# Patient Record
Sex: FEMALE | Race: BLACK OR AFRICAN AMERICAN | NOT HISPANIC OR LATINO | URBAN - METROPOLITAN AREA
[De-identification: names, ages, dates, MRNs, and addresses within clinical notes are randomized per-mention and may not be internally consistent; named-entity substitution may affect disease eponyms.]

---

## 2017-04-03 ENCOUNTER — EMERGENCY (EMERGENCY)
Facility: HOSPITAL | Age: 77
LOS: 1 days | Discharge: HOME | End: 2017-04-03
Attending: EMERGENCY MEDICINE
Payer: COMMERCIAL

## 2017-04-03 VITALS
RESPIRATION RATE: 16 BRPM | SYSTOLIC BLOOD PRESSURE: 158 MMHG | HEART RATE: 60 BPM | DIASTOLIC BLOOD PRESSURE: 119 MMHG | TEMPERATURE: 98 F | HEIGHT: 67 IN | OXYGEN SATURATION: 100 % | WEIGHT: 199.96 LBS

## 2017-04-03 VITALS
HEART RATE: 99 BPM | RESPIRATION RATE: 16 BRPM | DIASTOLIC BLOOD PRESSURE: 103 MMHG | SYSTOLIC BLOOD PRESSURE: 150 MMHG | OXYGEN SATURATION: 100 % | TEMPERATURE: 98 F

## 2017-04-03 DIAGNOSIS — S09.90XA UNSPECIFIED INJURY OF HEAD, INITIAL ENCOUNTER: ICD-10-CM

## 2017-04-03 DIAGNOSIS — M25.561 PAIN IN RIGHT KNEE: ICD-10-CM
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
## 2017-04-03 DIAGNOSIS — M25.521 PAIN IN RIGHT ELBOW: ICD-10-CM

## 2017-04-03 DIAGNOSIS — I10 ESSENTIAL (PRIMARY) HYPERTENSION: ICD-10-CM

## 2017-04-03 DIAGNOSIS — E11.9 TYPE 2 DIABETES MELLITUS WITHOUT COMPLICATIONS: ICD-10-CM

## 2017-04-03 DIAGNOSIS — M25.511 PAIN IN RIGHT SHOULDER: ICD-10-CM
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
## 2017-04-03 LAB
ALBUMIN SERPL ELPH-MCNC: 4.1 G/DL — SIGNIFICANT CHANGE UP (ref 3.5–5)
ALP SERPL-CCNC: 61 U/L — SIGNIFICANT CHANGE UP (ref 40–120)
ALT FLD-CCNC: 23 U/L DA — SIGNIFICANT CHANGE UP (ref 10–60)
ANION GAP SERPL CALC-SCNC: 10 MMOL/L — SIGNIFICANT CHANGE UP (ref 5–17)
APTT BLD: 32.7 SEC — SIGNIFICANT CHANGE UP (ref 27.5–37.4)
AST SERPL-CCNC: 30 U/L — SIGNIFICANT CHANGE UP (ref 10–40)
BASOPHILS # BLD AUTO: 0.1 K/UL — SIGNIFICANT CHANGE UP (ref 0–0.2)
BASOPHILS NFR BLD AUTO: 1.3 % — SIGNIFICANT CHANGE UP (ref 0–2)
BILIRUB SERPL-MCNC: 0.6 MG/DL — SIGNIFICANT CHANGE UP (ref 0.2–1.2)
BUN SERPL-MCNC: 22 MG/DL — HIGH (ref 7–18)
CALCIUM SERPL-MCNC: 10.7 MG/DL — HIGH (ref 8.4–10.5)
CHLORIDE SERPL-SCNC: 103 MMOL/L — SIGNIFICANT CHANGE UP (ref 96–108)
CO2 SERPL-SCNC: 28 MMOL/L — SIGNIFICANT CHANGE UP (ref 22–31)
CREAT SERPL-MCNC: 1.36 MG/DL — HIGH (ref 0.5–1.3)
DIGOXIN SERPL-MCNC: 0.8 NG/ML — SIGNIFICANT CHANGE UP (ref 0.8–2)
EOSINOPHIL # BLD AUTO: 0.2 K/UL — SIGNIFICANT CHANGE UP (ref 0–0.5)
EOSINOPHIL NFR BLD AUTO: 2.8 % — SIGNIFICANT CHANGE UP (ref 0–6)
GLUCOSE SERPL-MCNC: 111 MG/DL — HIGH (ref 70–99)
HCT VFR BLD CALC: 41.8 % — SIGNIFICANT CHANGE UP (ref 34.5–45)
HGB BLD-MCNC: 12.3 G/DL — SIGNIFICANT CHANGE UP (ref 11.5–15.5)
INR BLD: 1.42 RATIO — HIGH (ref 0.88–1.16)
LYMPHOCYTES # BLD AUTO: 2 K/UL — SIGNIFICANT CHANGE UP (ref 1–3.3)
LYMPHOCYTES # BLD AUTO: 27.6 % — SIGNIFICANT CHANGE UP (ref 13–44)
MCHC RBC-ENTMCNC: 20.7 PG — LOW (ref 27–34)
MCHC RBC-ENTMCNC: 29.4 GM/DL — LOW (ref 32–36)
MCV RBC AUTO: 70.4 FL — LOW (ref 80–100)
MONOCYTES # BLD AUTO: 0.6 K/UL — SIGNIFICANT CHANGE UP (ref 0–0.9)
MONOCYTES NFR BLD AUTO: 8.6 % — SIGNIFICANT CHANGE UP (ref 2–14)
NEUTROPHILS # BLD AUTO: 4.2 K/UL — SIGNIFICANT CHANGE UP (ref 1.8–7.4)
NEUTROPHILS NFR BLD AUTO: 59.7 % — SIGNIFICANT CHANGE UP (ref 43–77)
PLATELET # BLD AUTO: 272 K/UL — SIGNIFICANT CHANGE UP (ref 150–400)
POTASSIUM SERPL-MCNC: 3.3 MMOL/L — LOW (ref 3.5–5.3)
POTASSIUM SERPL-SCNC: 3.3 MMOL/L — LOW (ref 3.5–5.3)
PROT SERPL-MCNC: 8.5 G/DL — HIGH (ref 6–8.3)
PROTHROM AB SERPL-ACNC: 15.6 SEC — HIGH (ref 9.8–12.7)
RBC # BLD: 5.93 M/UL — HIGH (ref 3.8–5.2)
RBC # FLD: 16 % — HIGH (ref 10.3–14.5)
SODIUM SERPL-SCNC: 141 MMOL/L — SIGNIFICANT CHANGE UP (ref 135–145)
WBC # BLD: 7.1 K/UL — SIGNIFICANT CHANGE UP (ref 3.8–10.5)
WBC # FLD AUTO: 7.1 K/UL — SIGNIFICANT CHANGE UP (ref 3.8–10.5)

## 2017-04-03 PROCEDURE — 99284 EMERGENCY DEPT VISIT MOD MDM: CPT

## 2017-04-03 PROCEDURE — 73562 X-RAY EXAM OF KNEE 3: CPT

## 2017-04-03 PROCEDURE — 85027 COMPLETE CBC AUTOMATED: CPT

## 2017-04-03 PROCEDURE — 85730 THROMBOPLASTIN TIME PARTIAL: CPT

## 2017-04-03 PROCEDURE — 73562 X-RAY EXAM OF KNEE 3: CPT | Mod: 26,RT

## 2017-04-03 PROCEDURE — 70450 CT HEAD/BRAIN W/O DYE: CPT

## 2017-04-03 PROCEDURE — 73080 X-RAY EXAM OF ELBOW: CPT | Mod: 26,LT

## 2017-04-03 PROCEDURE — 85610 PROTHROMBIN TIME: CPT

## 2017-04-03 PROCEDURE — 73080 X-RAY EXAM OF ELBOW: CPT

## 2017-04-03 PROCEDURE — 36000 PLACE NEEDLE IN VEIN: CPT

## 2017-04-03 PROCEDURE — 73030 X-RAY EXAM OF SHOULDER: CPT

## 2017-04-03 PROCEDURE — 80053 COMPREHEN METABOLIC PANEL: CPT

## 2017-04-03 PROCEDURE — 73030 X-RAY EXAM OF SHOULDER: CPT | Mod: 26,LT

## 2017-04-03 PROCEDURE — 70450 CT HEAD/BRAIN W/O DYE: CPT | Mod: 26

## 2017-04-03 PROCEDURE — 80162 ASSAY OF DIGOXIN TOTAL: CPT

## 2017-04-03 RX ORDER — ACETAMINOPHEN 500 MG
975 TABLET ORAL ONCE
Qty: 0 | Refills: 0 | Status: COMPLETED | OUTPATIENT
Start: 2017-04-03 | End: 2017-04-03

## 2017-04-03 RX ADMIN — Medication 975 MILLIGRAM(S): at 17:21

## 2017-04-03 NOTE — ED PROVIDER NOTE - PROGRESS NOTE DETAILS
Pain improved as per pt; pt ambulating with steady gait. X-Rays and CT Head are normal. Dig level is normal. INR septic therapeutic instructed to increase; pt instructed for PMD f/u for possible increase of dosage

## 2017-04-03 NOTE — ED PROVIDER NOTE - CRANIAL NERVE AND PUPILLARY EXAM
corneal reflex intact/peripheral vision intact/central vision intact/cranial nerves 2-12 intact/gag reflex intact/cough reflex intact/tongue is midline/central and peripheral vision intact/extra-ocular movements intact

## 2017-04-03 NOTE — ED PROVIDER NOTE - CONSTITUTIONAL, MLM
normal... Well appearing, well nourished, awake, alert, oriented to person, place, time/situation and in no apparent distress. Ambulating with mild limp.

## 2017-04-03 NOTE — ED ADULT NURSE REASSESSMENT NOTE - NS ED NURSE REASSESS COMMENT FT1
Patient remains alert, responsive, resting in stretcher, moving all extremities. MD evaluation in progress. In no acute distress.

## 2017-04-03 NOTE — ED ADULT NURSE NOTE - OBJECTIVE STATEMENT
Patient complains of pain to left arm, right knee, back of head s/p trip and fall at home down about " 8 stairs." No bleeding or gross deformity noted. Denies loss of consciousness, nausea, vomiting at this time. Moving all extremities. Breathing easy and unlabored, speaking in full sentences, no use of accessory muscles. Family at bedside.

## 2017-04-03 NOTE — ED PROVIDER NOTE - MEDICAL DECISION MAKING DETAILS
75 y/o F pt c/o R knee pain, R elbow discomfort, R shoulder, and head trauma s/p mechanical fall down stairs today.  Plan for basic labs, INR check, DIG check, imaging for all affected areas, and reassess. Likely d/c home. 77 y/o F pt c/o R knee pain, R elbow discomfort, R shoulder, and head trauma s/p mechanical fall down stairs today.  Plan for basic labs, INR check, Dig check, imaging for all affected areas, and reassess. Likely d/c home.

## 2017-04-03 NOTE — ED ADULT TRIAGE NOTE - CHIEF COMPLAINT QUOTE
C/o s/p trip and fall going down stairs with pain to right hand, right arm, right shoulder and right knee C/o s/p trip and fall going down stairs with pain to right hand, right arm, right shoulder and right knee. Pt takes warfarin. Reports hitting head during fall. No LOC.

## 2017-04-03 NOTE — ED PROVIDER NOTE - OBJECTIVE STATEMENT
77 y/o F pt with PMHx of DM, Fluid Retention, Irregular Heart Beat (on Coumadin and Digoxin) and HTN and no PSHx presents to ED c/o R knee pain, R elbow discomfort, and R shoulder discomfort s/p mechanical fall today. Pt states she fell backwards down the stairs and progressively slid down the remaining stairs. Pt also reports head trauma during fall. Pt denies LOC, numbness, tingling, weakness, or any other complaints. Allergies: Clavulanate (unknown).

## 2017-04-03 NOTE — ED ADULT NURSE NOTE - CHIEF COMPLAINT QUOTE
C/o s/p trip and fall going down stairs with pain to right hand, right arm, right shoulder and right knee. Pt takes warfarin. Reports hitting head during fall. No LOC.

## 2017-04-03 NOTE — ED PROVIDER NOTE - NS ED MD SCRIBE ATTENDING SCRIBE SECTIONS
VITAL SIGNS( Pullset)/PAST MEDICAL/SURGICAL/SOCIAL HISTORY/REVIEW OF SYSTEMS/HISTORY OF PRESENT ILLNESS/DISPOSITION/PHYSICAL EXAM REVIEW OF SYSTEMS/HISTORY OF PRESENT ILLNESS/PHYSICAL EXAM/PROGRESS NOTE/VITAL SIGNS( Pullset)/PAST MEDICAL/SURGICAL/SOCIAL HISTORY/DISPOSITION

## 2017-06-19 ENCOUNTER — INPATIENT (INPATIENT)
Facility: HOSPITAL | Age: 77
LOS: 4 days | Discharge: HOME HEALTH | DRG: 308 | End: 2017-06-24
Attending: HOSPITALIST | Admitting: HOSPITALIST
Payer: COMMERCIAL

## 2017-06-19 VITALS
HEART RATE: 120 BPM | SYSTOLIC BLOOD PRESSURE: 160 MMHG | OXYGEN SATURATION: 99 % | WEIGHT: 199.96 LBS | TEMPERATURE: 100 F | HEIGHT: 63 IN | DIASTOLIC BLOOD PRESSURE: 126 MMHG | RESPIRATION RATE: 16 BRPM

## 2017-06-19 DIAGNOSIS — J18.9 PNEUMONIA, UNSPECIFIED ORGANISM: ICD-10-CM

## 2017-06-19 DIAGNOSIS — I24.9 ACUTE ISCHEMIC HEART DISEASE, UNSPECIFIED: ICD-10-CM

## 2017-06-19 DIAGNOSIS — N17.9 ACUTE KIDNEY FAILURE, UNSPECIFIED: ICD-10-CM

## 2017-06-19 DIAGNOSIS — I10 ESSENTIAL (PRIMARY) HYPERTENSION: ICD-10-CM

## 2017-06-19 DIAGNOSIS — E11.9 TYPE 2 DIABETES MELLITUS WITHOUT COMPLICATIONS: ICD-10-CM

## 2017-06-19 DIAGNOSIS — Z29.9 ENCOUNTER FOR PROPHYLACTIC MEASURES, UNSPECIFIED: ICD-10-CM

## 2017-06-19 DIAGNOSIS — I48.91 UNSPECIFIED ATRIAL FIBRILLATION: ICD-10-CM

## 2017-06-19 DIAGNOSIS — R79.1 ABNORMAL COAGULATION PROFILE: ICD-10-CM

## 2017-06-19 DIAGNOSIS — I50.9 HEART FAILURE, UNSPECIFIED: ICD-10-CM

## 2017-06-19 LAB
ALBUMIN SERPL ELPH-MCNC: 3.6 G/DL — SIGNIFICANT CHANGE UP (ref 3.5–5)
ALP SERPL-CCNC: 67 U/L — SIGNIFICANT CHANGE UP (ref 40–120)
ALT FLD-CCNC: 16 U/L DA — SIGNIFICANT CHANGE UP (ref 10–60)
ANION GAP SERPL CALC-SCNC: 9 MMOL/L — SIGNIFICANT CHANGE UP (ref 5–17)
ANION GAP SERPL CALC-SCNC: 9 MMOL/L — SIGNIFICANT CHANGE UP (ref 5–17)
APPEARANCE UR: CLEAR — SIGNIFICANT CHANGE UP
APTT BLD: 25.8 SEC — LOW (ref 27.5–37.4)
AST SERPL-CCNC: 17 U/L — SIGNIFICANT CHANGE UP (ref 10–40)
BASOPHILS # BLD AUTO: 0.1 K/UL — SIGNIFICANT CHANGE UP (ref 0–0.2)
BASOPHILS NFR BLD AUTO: 0.7 % — SIGNIFICANT CHANGE UP (ref 0–2)
BILIRUB SERPL-MCNC: 1 MG/DL — SIGNIFICANT CHANGE UP (ref 0.2–1.2)
BILIRUB UR-MCNC: NEGATIVE — SIGNIFICANT CHANGE UP
BUN SERPL-MCNC: 14 MG/DL — SIGNIFICANT CHANGE UP (ref 7–18)
BUN SERPL-MCNC: 15 MG/DL — SIGNIFICANT CHANGE UP (ref 7–18)
CALCIUM SERPL-MCNC: 9.4 MG/DL — SIGNIFICANT CHANGE UP (ref 8.4–10.5)
CALCIUM SERPL-MCNC: 9.6 MG/DL — SIGNIFICANT CHANGE UP (ref 8.4–10.5)
CHLORIDE SERPL-SCNC: 100 MMOL/L — SIGNIFICANT CHANGE UP (ref 96–108)
CHLORIDE SERPL-SCNC: 101 MMOL/L — SIGNIFICANT CHANGE UP (ref 96–108)
CK MB BLD-MCNC: 1.4 % — SIGNIFICANT CHANGE UP (ref 0–3.5)
CK MB BLD-MCNC: 1.5 % — SIGNIFICANT CHANGE UP (ref 0–3.5)
CK MB CFR SERPL CALC: 1.2 NG/ML — SIGNIFICANT CHANGE UP (ref 0–3.6)
CK MB CFR SERPL CALC: 1.2 NG/ML — SIGNIFICANT CHANGE UP (ref 0–3.6)
CK SERPL-CCNC: 81 U/L — SIGNIFICANT CHANGE UP (ref 21–215)
CK SERPL-CCNC: 88 U/L — SIGNIFICANT CHANGE UP (ref 21–215)
CO2 SERPL-SCNC: 29 MMOL/L — SIGNIFICANT CHANGE UP (ref 22–31)
CO2 SERPL-SCNC: 30 MMOL/L — SIGNIFICANT CHANGE UP (ref 22–31)
COLOR SPEC: YELLOW — SIGNIFICANT CHANGE UP
CREAT SERPL-MCNC: 1.29 MG/DL — SIGNIFICANT CHANGE UP (ref 0.5–1.3)
CREAT SERPL-MCNC: 1.4 MG/DL — HIGH (ref 0.5–1.3)
DIFF PNL FLD: NEGATIVE — SIGNIFICANT CHANGE UP
DIGOXIN SERPL-MCNC: 0.3 NG/ML — LOW (ref 0.8–2)
EOSINOPHIL # BLD AUTO: 0 K/UL — SIGNIFICANT CHANGE UP (ref 0–0.5)
EOSINOPHIL NFR BLD AUTO: 0.4 % — SIGNIFICANT CHANGE UP (ref 0–6)
GLUCOSE SERPL-MCNC: 147 MG/DL — HIGH (ref 70–99)
GLUCOSE SERPL-MCNC: 178 MG/DL — HIGH (ref 70–99)
GLUCOSE UR QL: NEGATIVE — SIGNIFICANT CHANGE UP
HBA1C BLD-MCNC: 6.6 % — HIGH (ref 4–5.6)
HCT VFR BLD CALC: 36.5 % — SIGNIFICANT CHANGE UP (ref 34.5–45)
HGB BLD-MCNC: 10.9 G/DL — LOW (ref 11.5–15.5)
INR BLD: 1.23 RATIO — HIGH (ref 0.88–1.16)
INR BLD: 1.25 RATIO — HIGH (ref 0.88–1.16)
KETONES UR-MCNC: NEGATIVE — SIGNIFICANT CHANGE UP
LACTATE SERPL-SCNC: 1.7 MMOL/L — SIGNIFICANT CHANGE UP (ref 0.7–2)
LEUKOCYTE ESTERASE UR-ACNC: NEGATIVE — SIGNIFICANT CHANGE UP
LYMPHOCYTES # BLD AUTO: 1.6 K/UL — SIGNIFICANT CHANGE UP (ref 1–3.3)
LYMPHOCYTES # BLD AUTO: 16.2 % — SIGNIFICANT CHANGE UP (ref 13–44)
MCHC RBC-ENTMCNC: 21.5 PG — LOW (ref 27–34)
MCHC RBC-ENTMCNC: 29.9 GM/DL — LOW (ref 32–36)
MCV RBC AUTO: 72 FL — LOW (ref 80–100)
MONOCYTES # BLD AUTO: 0.9 K/UL — SIGNIFICANT CHANGE UP (ref 0–0.9)
MONOCYTES NFR BLD AUTO: 9.3 % — SIGNIFICANT CHANGE UP (ref 2–14)
NEUTROPHILS # BLD AUTO: 7.1 K/UL — SIGNIFICANT CHANGE UP (ref 1.8–7.4)
NEUTROPHILS NFR BLD AUTO: 73.4 % — SIGNIFICANT CHANGE UP (ref 43–77)
NITRITE UR-MCNC: NEGATIVE — SIGNIFICANT CHANGE UP
NT-PROBNP SERPL-SCNC: 2735 PG/ML — HIGH (ref 0–450)
PH UR: 8 — SIGNIFICANT CHANGE UP (ref 5–8)
PLATELET # BLD AUTO: 325 K/UL — SIGNIFICANT CHANGE UP (ref 150–400)
POTASSIUM SERPL-MCNC: 3.2 MMOL/L — LOW (ref 3.5–5.3)
POTASSIUM SERPL-MCNC: 3.3 MMOL/L — LOW (ref 3.5–5.3)
POTASSIUM SERPL-SCNC: 3.2 MMOL/L — LOW (ref 3.5–5.3)
POTASSIUM SERPL-SCNC: 3.3 MMOL/L — LOW (ref 3.5–5.3)
PROT SERPL-MCNC: 8 G/DL — SIGNIFICANT CHANGE UP (ref 6–8.3)
PROT UR-MCNC: 100
PROTHROM AB SERPL-ACNC: 13.5 SEC — HIGH (ref 9.8–12.7)
PROTHROM AB SERPL-ACNC: 13.7 SEC — HIGH (ref 9.8–12.7)
RAPID RVP RESULT: DETECTED
RBC # BLD: 5.06 M/UL — SIGNIFICANT CHANGE UP (ref 3.8–5.2)
RBC # FLD: 15.7 % — HIGH (ref 10.3–14.5)
RV+EV RNA SPEC QL NAA+PROBE: DETECTED
SODIUM SERPL-SCNC: 138 MMOL/L — SIGNIFICANT CHANGE UP (ref 135–145)
SODIUM SERPL-SCNC: 140 MMOL/L — SIGNIFICANT CHANGE UP (ref 135–145)
SP GR SPEC: 1.01 — SIGNIFICANT CHANGE UP (ref 1.01–1.02)
TROPONIN I SERPL-MCNC: 0.05 NG/ML — HIGH (ref 0–0.04)
TROPONIN I SERPL-MCNC: 0.06 NG/ML — HIGH (ref 0–0.04)
TROPONIN I SERPL-MCNC: 0.06 NG/ML — HIGH (ref 0–0.04)
UROBILINOGEN FLD QL: NEGATIVE — SIGNIFICANT CHANGE UP
WBC # BLD: 9.7 K/UL — SIGNIFICANT CHANGE UP (ref 3.8–10.5)
WBC # FLD AUTO: 9.7 K/UL — SIGNIFICANT CHANGE UP (ref 3.8–10.5)

## 2017-06-19 PROCEDURE — 99223 1ST HOSP IP/OBS HIGH 75: CPT | Mod: GC

## 2017-06-19 PROCEDURE — 71010: CPT | Mod: 26

## 2017-06-19 PROCEDURE — 99285 EMERGENCY DEPT VISIT HI MDM: CPT

## 2017-06-19 RX ORDER — CEFTRIAXONE 500 MG/1
1 INJECTION, POWDER, FOR SOLUTION INTRAMUSCULAR; INTRAVENOUS EVERY 24 HOURS
Qty: 0 | Refills: 0 | Status: DISCONTINUED | OUTPATIENT
Start: 2017-06-19 | End: 2017-06-19

## 2017-06-19 RX ORDER — POTASSIUM CHLORIDE 20 MEQ
40 PACKET (EA) ORAL ONCE
Qty: 0 | Refills: 0 | Status: COMPLETED | OUTPATIENT
Start: 2017-06-19 | End: 2017-06-19

## 2017-06-19 RX ORDER — CEFTRIAXONE 500 MG/1
1 INJECTION, POWDER, FOR SOLUTION INTRAMUSCULAR; INTRAVENOUS ONCE
Qty: 0 | Refills: 0 | Status: COMPLETED | OUTPATIENT
Start: 2017-06-19 | End: 2017-06-19

## 2017-06-19 RX ORDER — LISINOPRIL 2.5 MG/1
5 TABLET ORAL DAILY
Qty: 0 | Refills: 0 | Status: DISCONTINUED | OUTPATIENT
Start: 2017-06-19 | End: 2017-06-24

## 2017-06-19 RX ORDER — ACETAMINOPHEN 500 MG
975 TABLET ORAL ONCE
Qty: 0 | Refills: 0 | Status: COMPLETED | OUTPATIENT
Start: 2017-06-19 | End: 2017-06-19

## 2017-06-19 RX ORDER — AZITHROMYCIN 500 MG/1
500 TABLET, FILM COATED ORAL ONCE
Qty: 0 | Refills: 0 | Status: COMPLETED | OUTPATIENT
Start: 2017-06-19 | End: 2017-06-19

## 2017-06-19 RX ORDER — ASPIRIN/CALCIUM CARB/MAGNESIUM 324 MG
81 TABLET ORAL DAILY
Qty: 0 | Refills: 0 | Status: DISCONTINUED | OUTPATIENT
Start: 2017-06-19 | End: 2017-06-24

## 2017-06-19 RX ORDER — ATENOLOL 25 MG/1
100 TABLET ORAL DAILY
Qty: 0 | Refills: 0 | Status: DISCONTINUED | OUTPATIENT
Start: 2017-06-19 | End: 2017-06-19

## 2017-06-19 RX ORDER — DIGOXIN 250 MCG
0.12 TABLET ORAL DAILY
Qty: 0 | Refills: 0 | Status: DISCONTINUED | OUTPATIENT
Start: 2017-06-19 | End: 2017-06-24

## 2017-06-19 RX ORDER — POTASSIUM CHLORIDE 20 MEQ
40 PACKET (EA) ORAL ONCE
Qty: 0 | Refills: 0 | Status: DISCONTINUED | OUTPATIENT
Start: 2017-06-19 | End: 2017-06-19

## 2017-06-19 RX ORDER — INSULIN LISPRO 100/ML
VIAL (ML) SUBCUTANEOUS
Qty: 0 | Refills: 0 | Status: DISCONTINUED | OUTPATIENT
Start: 2017-06-19 | End: 2017-06-24

## 2017-06-19 RX ORDER — METOPROLOL TARTRATE 50 MG
25 TABLET ORAL
Qty: 0 | Refills: 0 | Status: DISCONTINUED | OUTPATIENT
Start: 2017-06-19 | End: 2017-06-24

## 2017-06-19 RX ORDER — WARFARIN SODIUM 2.5 MG/1
5 TABLET ORAL ONCE
Qty: 0 | Refills: 0 | Status: DISCONTINUED | OUTPATIENT
Start: 2017-06-19 | End: 2017-06-19

## 2017-06-19 RX ORDER — FUROSEMIDE 40 MG
40 TABLET ORAL DAILY
Qty: 0 | Refills: 0 | Status: DISCONTINUED | OUTPATIENT
Start: 2017-06-19 | End: 2017-06-21

## 2017-06-19 RX ORDER — ATORVASTATIN CALCIUM 80 MG/1
40 TABLET, FILM COATED ORAL AT BEDTIME
Qty: 0 | Refills: 0 | Status: DISCONTINUED | OUTPATIENT
Start: 2017-06-19 | End: 2017-06-24

## 2017-06-19 RX ORDER — SODIUM CHLORIDE 9 MG/ML
3 INJECTION INTRAMUSCULAR; INTRAVENOUS; SUBCUTANEOUS ONCE
Qty: 0 | Refills: 0 | Status: COMPLETED | OUTPATIENT
Start: 2017-06-19 | End: 2017-06-19

## 2017-06-19 RX ORDER — FUROSEMIDE 40 MG
40 TABLET ORAL DAILY
Qty: 0 | Refills: 0 | Status: DISCONTINUED | OUTPATIENT
Start: 2017-06-19 | End: 2017-06-19

## 2017-06-19 RX ORDER — MECLIZINE HCL 12.5 MG
25 TABLET ORAL THREE TIMES A DAY
Qty: 0 | Refills: 0 | Status: DISCONTINUED | OUTPATIENT
Start: 2017-06-19 | End: 2017-06-24

## 2017-06-19 RX ORDER — HEPARIN SODIUM 5000 [USP'U]/ML
5000 INJECTION INTRAVENOUS; SUBCUTANEOUS EVERY 12 HOURS
Qty: 0 | Refills: 0 | Status: DISCONTINUED | OUTPATIENT
Start: 2017-06-19 | End: 2017-06-23

## 2017-06-19 RX ORDER — AZITHROMYCIN 500 MG/1
500 TABLET, FILM COATED ORAL EVERY 24 HOURS
Qty: 0 | Refills: 0 | Status: DISCONTINUED | OUTPATIENT
Start: 2017-06-19 | End: 2017-06-21

## 2017-06-19 RX ORDER — WARFARIN SODIUM 2.5 MG/1
7.5 TABLET ORAL ONCE
Qty: 0 | Refills: 0 | Status: COMPLETED | OUTPATIENT
Start: 2017-06-19 | End: 2017-06-19

## 2017-06-19 RX ADMIN — ATORVASTATIN CALCIUM 40 MILLIGRAM(S): 80 TABLET, FILM COATED ORAL at 22:06

## 2017-06-19 RX ADMIN — Medication 81 MILLIGRAM(S): at 15:36

## 2017-06-19 RX ADMIN — SODIUM CHLORIDE 3 MILLILITER(S): 9 INJECTION INTRAMUSCULAR; INTRAVENOUS; SUBCUTANEOUS at 10:52

## 2017-06-19 RX ADMIN — Medication 25 MILLIGRAM(S): at 18:24

## 2017-06-19 RX ADMIN — Medication 25 MILLIGRAM(S): at 14:00

## 2017-06-19 RX ADMIN — WARFARIN SODIUM 7.5 MILLIGRAM(S): 2.5 TABLET ORAL at 22:06

## 2017-06-19 RX ADMIN — AZITHROMYCIN 250 MILLIGRAM(S): 500 TABLET, FILM COATED ORAL at 23:53

## 2017-06-19 RX ADMIN — Medication 40 MILLIEQUIVALENT(S): at 22:06

## 2017-06-19 RX ADMIN — LISINOPRIL 5 MILLIGRAM(S): 2.5 TABLET ORAL at 15:36

## 2017-06-19 RX ADMIN — Medication 30 MILLILITER(S): at 22:16

## 2017-06-19 RX ADMIN — CEFTRIAXONE 100 GRAM(S): 500 INJECTION, POWDER, FOR SOLUTION INTRAMUSCULAR; INTRAVENOUS at 13:20

## 2017-06-19 RX ADMIN — AZITHROMYCIN 250 MILLIGRAM(S): 500 TABLET, FILM COATED ORAL at 14:00

## 2017-06-19 RX ADMIN — Medication 40 MILLIEQUIVALENT(S): at 15:36

## 2017-06-19 RX ADMIN — Medication 0.12 MILLIGRAM(S): at 15:36

## 2017-06-19 RX ADMIN — Medication 40 MILLIGRAM(S): at 15:36

## 2017-06-19 RX ADMIN — HEPARIN SODIUM 5000 UNIT(S): 5000 INJECTION INTRAVENOUS; SUBCUTANEOUS at 18:24

## 2017-06-19 RX ADMIN — Medication 975 MILLIGRAM(S): at 13:19

## 2017-06-19 NOTE — ED PROVIDER NOTE - CARE PLAN
Principal Discharge DX:	Atrial fibrillation  Secondary Diagnosis:	Pneumonia Principal Discharge DX:	Atrial fibrillation  Secondary Diagnosis:	Pneumonia  Secondary Diagnosis:	ACS (acute coronary syndrome)

## 2017-06-19 NOTE — H&P ADULT - ASSESSMENT
75 y/o female from home, walks with a cane at baseline, with PMHx of DM, HTN, (A Fib on coumadin) CVA (chronic occipital infarct) vertigo and ?CHF(on lasix and lisinopril at home) and PSx of removal of a cyst in the back 6 years ago presented with generalized body aches and fever since 3 days. 75 y/o female from home, walks with a cane at baseline, with PMHx of DM, HTN, (A Fib on coumadin) CVA (chronic occipital infarct) vertigo and ?CHF(on lasix and lisinopril at home) and PSx of removal of a cyst in the back 6 years ago presented with generalized body aches and fever since 3 days.   Patient states having intermittent cough since last month. She had cough and a mechanical fall 1 month ago for which she was evaluated in the ED and discharged after negative imaging and normal labs([place],2017). Also she ran out of her atenolol and digoxin since last month . Since last 3 days she has had 'all body aches and fever with chills. Cough is dry and associated with fever, chills, intermittent dyspnea, nasal congestion, diaphoresis but no chest pain or palpitations. Denies recent sick contacts, recent travel, long periods of immobilization. Pt did not take any medication for symptoms. Denies pedal edema, calf pain, vomiting, chills, diarrhea, constipation or abdominal pain. Has increased frequency of urine due to lasix but no dysuria. No flu shot. She had a normal stress test and ECHO about 6 months ago at out patient. She did not take any of the medication yesterday.

## 2017-06-19 NOTE — H&P ADULT - PROBLEM SELECTOR PLAN 4
- continue lisinopril and atenolol with parameters - BUN/C ratio<20   - likely sec to diuretics (contraction alkalosis and hypokalemia)  - hold nephrotoxic drugs   - BUN/Cr in am   - f/u UA and urine lytes '  - renal US if persists   - replace and monitor electrolyses

## 2017-06-19 NOTE — H&P ADULT - PROBLEM SELECTOR PLAN 3
- troponin elevated to 0.059 and pro-BNP 2735   - will monitor in telemetry   - trend cardiac enzymes   - resume Lasix once patient is stable (Afib controlled and afebrile)    - continue aspirin, statin and beta-blocker(atenolol) as per ACS protocol. - troponin elevated to 0.059   - will monitor in telemetry   - trend cardiac enzymes   - resume Lasix once patient is stable (Afib controlled and afebrile)    - continue aspirin, statin and beta-blocker(atenolol) as per ACS protocol.

## 2017-06-19 NOTE — H&P ADULT - NSHPLABSRESULTS_GEN_ALL_CORE
Vital Signs Last 24 Hrs  T(C): 37.9, Max: 37.9 (06-19 @ 10:03)  T(F): 100.2, Max: 100.2 (06-19 @ 10:03)  HR: 120 (120 - 120)  BP: 160/126 (160/126 - 160/126)  BP(mean): --  RR: 16 (16 - 16)  SpO2: 99% (99% - 99%)    INTERPRETATION:  Portable chest x-ray    Indication: Chest pain.    Portable chest x-ray is compared to a previous examination dated 2/6/2013.    Impression: No evidence for pulmonary consolidation, pleural effusion or   pneumothorax.    Skinfolds bilaterally.    New small left basilar atelectasis.    Possible mild right peritracheal density with mild left tracheal   deviation. 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 If clinically   indicated, chest CT without IV contrast may be pursued for further   evaluation on a nonemergent outpatient basis.

## 2017-06-19 NOTE — H&P ADULT - NEGATIVE GASTROINTESTINAL SYMPTOMS
no change in bowel habits/no nausea/no vomiting/no diarrhea/no flatulence/no constipation/no abdominal pain

## 2017-06-19 NOTE — H&P ADULT - HISTORY OF PRESENT ILLNESS
77 y/o female from home, walks with a cane at baseline, with PMHx of DM, HTN, (A Fib on coumadin) CVA (chronic occipital infarct) vertigo and ?CHF(on lasix and lisinopril at home) and PSx of removal of a cyst in the back 6 years ago presented with generalized body aches and fever since 3 days.   Patient states having intermittent cough since last month. She had cough and a mechanical fall 1 month ago for which she was evaluated in the ED and discharged after negative imaging and normal labs([place],2017). Since last 3 days she has had 'all body aches and fever with chills. Cough is dry and associated with fever, chills, intermittent dyspnea, nasal congestion, diaphoresis but no chest pain or palpitations. Denies recent sick contacts, recent travel, long periods of immobilization. Pt did not take any medication for symptoms. Denies pedal edema, calf pain, vomiting, chills, diarrhea, constipation or abdominal pain. Has increased frequency of urine but no dysuria. No flu shot. She had a normal stress test and ECHO about 6 months ago at out patient.   Non smoker. 75 y/o female from home, walks with a cane at baseline, with PMH of DM, HTN, (A Fib on coumadin) CVA (chronic occipital infarct), vertigo and ?CHF(on lasix and lisinopril at home) and PSH of removal of a cyst in the back 6 years ago presented with generalized body aches and fever since 3 days.   Patient states having intermittent cough since last month. She had cough and a mechanical fall 1 month ago for which she was evaluated in the ED and discharged after negative imaging and normal labs([place],2017). Also she ran out of her atenolol and digoxin since last month . Since last 3 days she has had 'all body aches and fever with chills. Cough is dry and associated with fever, chills, intermittent dyspnea, nasal congestion, diaphoresis but no chest pain or palpitations. Denies recent sick contacts, recent travel, long periods of immobilization. Pt did not take any medication for symptoms. Denies pedal edema, calf pain, vomiting, chills, diarrhea, constipation or abdominal pain. Has increased frequency of urine due to lasix but no dysuria. No flu shot. She had a normal stress test and ECHO about 6 months ago at out patient. She did not take any of the medication yesterday.   Non smoker. 77 y/o female from home, walks with a cane at baseline, with PMH of DM, HTN, (A Fib on coumadin) CVA (chronic occipital infarct), arthritis (?gout of the knees on colchicine and probenecid)  vertigo and ?CHF(on lasix and lisinopril at home) and PSH of removal of a cyst in the back 6 years ago presented with generalized body aches and fever since 3 days.   Patient states having intermittent cough since last month. She had cough and a mechanical fall 1 month ago for which she was evaluated in the ED and discharged after negative imaging and normal labs([place],2017). Also she ran out of her atenolol and digoxin since last month . Since last 3 days she has had 'all body aches and fever with chills. Cough is dry and associated with fever, chills, intermittent dyspnea, nasal congestion, diaphoresis but no chest pain or palpitations. Denies recent sick contacts, recent travel, long periods of immobilization. Pt did not take any medication for symptoms. Denies pedal edema, calf pain, vomiting, chills, diarrhea, constipation or abdominal pain. Has increased frequency of urine due to lasix but no dysuria. No flu shot. She had a normal stress test and ECHO about 6 months ago at out patient. She did not take any of the medication yesterday.   Non smoker.

## 2017-06-19 NOTE — H&P ADULT - PROBLEM SELECTOR PLAN 2
- most likely bacterial on viral pneumonia   - rvp positive for enterovirus and CXR shows a new LL atelectasis   - will continue Rocephin and Zithromax for CAP (patient was in ED briefly for a day within 90 days but doesn't have leucocytosis hence will treat as CAP)   - Tylenol as needed for fever   - will hold lasix for flu - most likely developing bacterial pneumonia (left sided atelectasis on CXR) on viral bronchitis   - rvp positive for enterovirus and CXR shows a new LL atelectasis   - will continue Rocephin and Zithromax for CAP (patient was in ED briefly for a day within 90 days but doesn't have leucocytosis hence will treat as CAP)   - Tylenol as needed for fever   - will hold lasix for flu - most likely developing bacterial pneumonia (left sided atelectasis on CXR) on viral bronchitis   - rvp positive for enterovirus and CXR shows a new LL atelectasis   - will continue Rocephin and Zithromax for CAP (patient was in ED briefly for a day within 90 days but doesn't have leucocytosis hence will treat as CAP)   - Tylenol as needed for fever

## 2017-06-19 NOTE — H&P ADULT - ATTENDING COMMENTS
Patient was seen and examined by myself with team. Case was discussed with house staff in details.  Plan discussed with patient and family in details at bedside and all their questions / concerns were addressed  75 y/o f admitted for Rapid AFIB due to medication non compliance; also with acute bronchitis  - admit to telemetry  - Rate control;   serial cardiac enzymes.  Mildly elevated troponin likely du to demand in the setting of rapid AFIB.- trend enzymes and monitor.  Sub therapeutic INR- dose coumadin 7.5 tonight; Monitor INR daily.  Other plan as outlined above.

## 2017-06-19 NOTE — H&P ADULT - PROBLEM SELECTOR PLAN 1
- BRAYAN VASC of 4 (5 if CHF+)  - Afiv with rvr secondary to non compliance with medications and viral pneumonia   - rate controlled significantly by fluids only (120s to 90s).   - cardizem 10 once given in ED   - will resume patient's atenolol and digoxin for rate control (dig level only 0.5 confirms non compliance). f/u repeat level in am   - will continue warfarin at home dose; f/u Pt INR for dose titration   - CT head 4/2017 shows chronic occipital infarct; will continue aspirin and lipitor - BRAYAN VASC of 4 (5 if CHF+)  - Afiv with rvr secondary to non compliance with medications and viral pneumonia   - rate controlled significantly by fluids only (120s to 90s).   - iv cardizem 10 once given in ED   - will resume digoxin for rate control (dig level only 0.5 confirms non compliance). f/u repeat level in am   - will give lopressor 25 bid in place of atenolol 100mg daily   - will continue warfarin at home dose; f/u Pt INR for dose titration   - CT head 4/2017 shows chronic occipital infarct; will continue aspirin and lipitor - BRAYAN VASC of 4 (5 if CHF+)  - Afib with rvr secondary to non compliance with medications and viral pneumonia   - rate controlled significantly by fluids only (120s to 90s).   - iv cardizem 10 once given in ED   - will resume digoxin for rate control (dig level only 0.5 confirms non compliance). f/u repeat level in am   - will give lopressor 25 bid in place of atenolol 100mg daily   - CT head 4/2017 shows chronic occipital infarct; will continue aspirin and lipitor - BRAYAN VASC of 4 (5 if CHF+)  - Afib with rvr secondary to non compliance with medications and viral pneumonia   - rate controlled significantly by fluids only (120s to 90s).   - iv cardizem 10 once given in ED   - will resume digoxin for rate control (dig level only 0.5 confirms non compliance). f/u repeat level in am   - will give lopressor 25 bid in place of atenolol 100mg daily   - CT head 4/2017 shows chronic occipital infarct; will continue aspirin and lipitor  - HTN urgency on presentation was also due to non complaince and resolved on resuming home meds

## 2017-06-19 NOTE — ED PROVIDER NOTE - OBJECTIVE STATEMENT
77 y/o female with significant PMHx of DM, HTN, vertigo, presents to the ED c/o generalized body aches and fever x today. Pt also reports cough and intermittent HULL. Pt denies recent sick contacts, recent travel, long periods of immobilization or Hx of DVT/PE/CHF. Pt did not take any medication for Sx. Pt's last visit in ED was 2 months ago. Pt denies CP, pedal edema, diaphoresis, palpitations, calf pain, LOC, vomiting, chills, or any other complaints. NKDA. Currently taking: Digoxin, coumadin, Baby ASA. Non-smoker.

## 2017-06-19 NOTE — H&P ADULT - PROBLEM SELECTOR PLAN 7
- BUN/C ratio<20   - likely sec to diuretics (contraction alkalosis and hypokalemia)  - hold lasix and nephrotoxic drugs   - BUN/Cr in am   - f/u UA and urine lytes '  - renal US if persists   - replace and monitor electrolyses - hold metformin and glipizide  - continue HSS - continue lisinopril and lopressor with parameters

## 2017-06-19 NOTE — H&P ADULT - PROBLEM SELECTOR PLAN 5
- hold metformin and glipizide  - continue HSS - patient has 2-3+ pedal edema and pro-BNP 2735   - will give lasix 45 iv daily

## 2017-06-19 NOTE — H&P ADULT - PROBLEM SELECTOR PLAN 6
- IMPROVE VTE is 2  - sc heparin for DVT ppx - continue lisinopril and lopressor with parameters - patient has supra therapeutic INR sec to non compliance. '  - will give dose of coumadin 7.5 tonight   - resume home dose of warfarin once INR therapeutic   - f/u Pt INR in am for dose titration

## 2017-06-20 DIAGNOSIS — I50.41 ACUTE COMBINED SYSTOLIC (CONGESTIVE) AND DIASTOLIC (CONGESTIVE) HEART FAILURE: ICD-10-CM

## 2017-06-20 DIAGNOSIS — D64.9 ANEMIA, UNSPECIFIED: ICD-10-CM

## 2017-06-20 LAB
ALBUMIN SERPL ELPH-MCNC: 3.3 G/DL — LOW (ref 3.5–5)
ALP SERPL-CCNC: 59 U/L — SIGNIFICANT CHANGE UP (ref 40–120)
ALT FLD-CCNC: 15 U/L DA — SIGNIFICANT CHANGE UP (ref 10–60)
ANION GAP SERPL CALC-SCNC: 8 MMOL/L — SIGNIFICANT CHANGE UP (ref 5–17)
AST SERPL-CCNC: 15 U/L — SIGNIFICANT CHANGE UP (ref 10–40)
BASOPHILS # BLD AUTO: 0.1 K/UL — SIGNIFICANT CHANGE UP (ref 0–0.2)
BASOPHILS NFR BLD AUTO: 0.7 % — SIGNIFICANT CHANGE UP (ref 0–2)
BILIRUB SERPL-MCNC: 0.8 MG/DL — SIGNIFICANT CHANGE UP (ref 0.2–1.2)
BUN SERPL-MCNC: 16 MG/DL — SIGNIFICANT CHANGE UP (ref 7–18)
CALCIUM SERPL-MCNC: 9.1 MG/DL — SIGNIFICANT CHANGE UP (ref 8.4–10.5)
CHLORIDE SERPL-SCNC: 103 MMOL/L — SIGNIFICANT CHANGE UP (ref 96–108)
CO2 SERPL-SCNC: 31 MMOL/L — SIGNIFICANT CHANGE UP (ref 22–31)
CREAT SERPL-MCNC: 1.44 MG/DL — HIGH (ref 0.5–1.3)
CULTURE RESULTS: SIGNIFICANT CHANGE UP
DIGOXIN SERPL-MCNC: 0.6 NG/ML — LOW (ref 0.8–2)
EOSINOPHIL # BLD AUTO: 0.1 K/UL — SIGNIFICANT CHANGE UP (ref 0–0.5)
EOSINOPHIL NFR BLD AUTO: 0.7 % — SIGNIFICANT CHANGE UP (ref 0–6)
FERRITIN SERPL-MCNC: 34 NG/ML — SIGNIFICANT CHANGE UP (ref 15–150)
GLUCOSE SERPL-MCNC: 114 MG/DL — HIGH (ref 70–99)
HCT VFR BLD CALC: 33 % — LOW (ref 34.5–45)
HCT VFR BLD CALC: 33.6 % — LOW (ref 34.5–45)
HGB BLD-MCNC: 10 G/DL — LOW (ref 11.5–15.5)
HGB BLD-MCNC: 9.9 G/DL — LOW (ref 11.5–15.5)
INR BLD: 1.29 RATIO — HIGH (ref 0.88–1.16)
IRON SATN MFR SERPL: 35 UG/DL — LOW (ref 40–150)
IRON SATN MFR SERPL: 9 % — LOW (ref 15–50)
LYMPHOCYTES # BLD AUTO: 1.6 K/UL — SIGNIFICANT CHANGE UP (ref 1–3.3)
LYMPHOCYTES # BLD AUTO: 18.6 % — SIGNIFICANT CHANGE UP (ref 13–44)
MAGNESIUM SERPL-MCNC: 2.1 MG/DL — SIGNIFICANT CHANGE UP (ref 1.6–2.6)
MCHC RBC-ENTMCNC: 21.8 PG — LOW (ref 27–34)
MCHC RBC-ENTMCNC: 21.9 PG — LOW (ref 27–34)
MCHC RBC-ENTMCNC: 29.9 GM/DL — LOW (ref 32–36)
MCHC RBC-ENTMCNC: 30.1 GM/DL — LOW (ref 32–36)
MCV RBC AUTO: 72.8 FL — LOW (ref 80–100)
MCV RBC AUTO: 72.9 FL — LOW (ref 80–100)
MONOCYTES # BLD AUTO: 0.7 K/UL — SIGNIFICANT CHANGE UP (ref 0–0.9)
MONOCYTES NFR BLD AUTO: 8.7 % — SIGNIFICANT CHANGE UP (ref 2–14)
NEUTROPHILS # BLD AUTO: 6 K/UL — SIGNIFICANT CHANGE UP (ref 1.8–7.4)
NEUTROPHILS NFR BLD AUTO: 71.2 % — SIGNIFICANT CHANGE UP (ref 43–77)
OB PNL STL: NEGATIVE — SIGNIFICANT CHANGE UP
PHOSPHATE SERPL-MCNC: 3.5 MG/DL — SIGNIFICANT CHANGE UP (ref 2.5–4.5)
PLATELET # BLD AUTO: 225 K/UL — SIGNIFICANT CHANGE UP (ref 150–400)
PLATELET # BLD AUTO: 234 K/UL — SIGNIFICANT CHANGE UP (ref 150–400)
POTASSIUM SERPL-MCNC: 3.9 MMOL/L — SIGNIFICANT CHANGE UP (ref 3.5–5.3)
POTASSIUM SERPL-SCNC: 3.9 MMOL/L — SIGNIFICANT CHANGE UP (ref 3.5–5.3)
PROT SERPL-MCNC: 7.1 G/DL — SIGNIFICANT CHANGE UP (ref 6–8.3)
PROTHROM AB SERPL-ACNC: 14.1 SEC — HIGH (ref 9.8–12.7)
RBC # BLD: 4.54 M/UL — SIGNIFICANT CHANGE UP (ref 3.8–5.2)
RBC # BLD: 4.61 M/UL — SIGNIFICANT CHANGE UP (ref 3.8–5.2)
RBC # FLD: 15.5 % — HIGH (ref 10.3–14.5)
RBC # FLD: 15.7 % — HIGH (ref 10.3–14.5)
SODIUM SERPL-SCNC: 142 MMOL/L — SIGNIFICANT CHANGE UP (ref 135–145)
SPECIMEN SOURCE: SIGNIFICANT CHANGE UP
TIBC SERPL-MCNC: 381 UG/DL — SIGNIFICANT CHANGE UP (ref 250–450)
TRANSFERRIN SERPL-MCNC: 327 MG/DL — SIGNIFICANT CHANGE UP (ref 200–360)
UIBC SERPL-MCNC: 346 UG/DL — SIGNIFICANT CHANGE UP (ref 110–370)
WBC # BLD: 7.8 K/UL — SIGNIFICANT CHANGE UP (ref 3.8–10.5)
WBC # BLD: 8.4 K/UL — SIGNIFICANT CHANGE UP (ref 3.8–10.5)
WBC # FLD AUTO: 7.8 K/UL — SIGNIFICANT CHANGE UP (ref 3.8–10.5)
WBC # FLD AUTO: 8.4 K/UL — SIGNIFICANT CHANGE UP (ref 3.8–10.5)

## 2017-06-20 PROCEDURE — 99233 SBSQ HOSP IP/OBS HIGH 50: CPT | Mod: GC

## 2017-06-20 RX ORDER — METOPROLOL TARTRATE 50 MG
1 TABLET ORAL
Qty: 60 | Refills: 0 | OUTPATIENT
Start: 2017-06-20 | End: 2017-07-20

## 2017-06-20 RX ORDER — FERROUS SULFATE 325(65) MG
325 TABLET ORAL DAILY
Qty: 0 | Refills: 0 | Status: DISCONTINUED | OUTPATIENT
Start: 2017-06-20 | End: 2017-06-24

## 2017-06-20 RX ORDER — IRON SUCROSE 20 MG/ML
100 INJECTION, SOLUTION INTRAVENOUS DAILY
Qty: 0 | Refills: 0 | Status: DISCONTINUED | OUTPATIENT
Start: 2017-06-20 | End: 2017-06-20

## 2017-06-20 RX ORDER — FERROUS SULFATE 325(65) MG
1 TABLET ORAL
Qty: 30 | Refills: 0 | OUTPATIENT
Start: 2017-06-20 | End: 2017-07-20

## 2017-06-20 RX ORDER — WARFARIN SODIUM 2.5 MG/1
7.5 TABLET ORAL ONCE
Qty: 0 | Refills: 0 | Status: COMPLETED | OUTPATIENT
Start: 2017-06-20 | End: 2017-06-20

## 2017-06-20 RX ORDER — ATENOLOL 25 MG/1
1 TABLET ORAL
Qty: 0 | Refills: 0 | COMMUNITY

## 2017-06-20 RX ORDER — ATORVASTATIN CALCIUM 80 MG/1
1 TABLET, FILM COATED ORAL
Qty: 30 | Refills: 0 | OUTPATIENT
Start: 2017-06-20 | End: 2017-07-20

## 2017-06-20 RX ADMIN — Medication 25 MILLIGRAM(S): at 21:21

## 2017-06-20 RX ADMIN — Medication 25 MILLIGRAM(S): at 07:03

## 2017-06-20 RX ADMIN — Medication 81 MILLIGRAM(S): at 12:48

## 2017-06-20 RX ADMIN — Medication 30 MILLILITER(S): at 01:56

## 2017-06-20 RX ADMIN — Medication 0.12 MILLIGRAM(S): at 07:03

## 2017-06-20 RX ADMIN — HEPARIN SODIUM 5000 UNIT(S): 5000 INJECTION INTRAVENOUS; SUBCUTANEOUS at 17:25

## 2017-06-20 RX ADMIN — Medication 25 MILLIGRAM(S): at 17:25

## 2017-06-20 RX ADMIN — HEPARIN SODIUM 5000 UNIT(S): 5000 INJECTION INTRAVENOUS; SUBCUTANEOUS at 07:03

## 2017-06-20 RX ADMIN — Medication 40 MILLIGRAM(S): at 07:03

## 2017-06-20 RX ADMIN — LISINOPRIL 5 MILLIGRAM(S): 2.5 TABLET ORAL at 07:03

## 2017-06-20 RX ADMIN — Medication 30 MILLILITER(S): at 22:05

## 2017-06-20 RX ADMIN — WARFARIN SODIUM 7.5 MILLIGRAM(S): 2.5 TABLET ORAL at 21:21

## 2017-06-20 RX ADMIN — ATORVASTATIN CALCIUM 40 MILLIGRAM(S): 80 TABLET, FILM COATED ORAL at 21:21

## 2017-06-20 RX ADMIN — AZITHROMYCIN 250 MILLIGRAM(S): 500 TABLET, FILM COATED ORAL at 23:22

## 2017-06-20 RX ADMIN — Medication 1: at 12:49

## 2017-06-20 RX ADMIN — Medication 25 MILLIGRAM(S): at 14:53

## 2017-06-20 NOTE — DIETITIAN INITIAL EVALUATION ADULT. - NS AS NUTRI INTERV COLLABORAT
Discussed with team/Collaboration with other providers Discussed with MD/Collaboration with other providers

## 2017-06-20 NOTE — PROGRESS NOTE ADULT - PROBLEM SELECTOR PLAN 2
- Rvp positive for enterovirus and CXR shows a new LLL atelectasis   - Continue with Zithromax for CAP  ( day 2)   - No WBC count , fever   - Tylenol as needed for fever - ECHO result noted above   - Patient clinically better  - Continue with IV lasix 40 mg

## 2017-06-20 NOTE — PROGRESS NOTE ADULT - PROBLEM SELECTOR PLAN 5
- patient has 2-3+ pedal edema and pro-BNP 2735   - will give lasix 45 iv daily - Patient has 1 + pedal edema and pro-BNP 2735   - will give lasix 45 iv daily -Creatinine trended up to 1.44  - hold nephrotoxic drugs  - Monitor the creatinine

## 2017-06-20 NOTE — PROGRESS NOTE ADULT - PROBLEM SELECTOR PLAN 1
- BRAYAN VASC of 4 (5 if CHF+)  - Heart rate better controlled on telemetry   - Continue with digoxin 0,125 mg daily, lopressor 25 mg BID.   - Continue with aspirin and lipitor  - ECHO shows moderate severe mitral regurgitation, mild aortic stenosis , moderate left atrial enlargement, moderate global LV dysfunction , grade 2 diastolic dysfunction, moderate severe tricuspid regurgitation. - BRAYAN VASC of 4   - Heart rate better controlled on telemetry   - Continue with digoxin 0,125 mg daily, lopressor 25 mg BID.   - Continue with aspirin and lipitor  - ECHO shows moderate severe mitral regurgitation, mild aortic stenosis , moderate left atrial enlargement, moderate global LV dysfunction , grade 2 diastolic dysfunction, moderate severe tricuspid regurgitation.  - Valvular pathology seen

## 2017-06-20 NOTE — DIETITIAN INITIAL EVALUATION ADULT. - OTHER INFO
nutrition consult requested for question for prescribed diet; lives home PTA; skin intact, 2+ pitting edema; denied GI distress, chewing or swallowing problem at present, no specific food choices reported; decreased po intake x 2-3 d due to intermittent stomach pain/discomfort (MD aware); Seen by diet technician today for food preferences nutrition consult requested for question for prescribed diet; lives home PTA; skin intact, 2+ pitting edema; denied GI distress, chewing or swallowing problem at present, no specific food choices reported; decreased po intake x 2-3 d due to intermittent stomach pain/discomfort (MD aware); Seen by diet technician today for food preferences; Pt not interested in diet education/nutrition information at present

## 2017-06-20 NOTE — DISCHARGE NOTE ADULT - MEDICATION SUMMARY - MEDICATIONS TO TAKE
I will START or STAY ON the medications listed below when I get home from the hospital:    aspirin 81 mg oral tablet  -- 1 tab(s) by mouth once a day  -- Indication: For cardioprotective    lisinopril 5 mg oral tablet  -- 1 tab(s) by mouth once a day  -- Indication: For Hypertension    digoxin 125 mcg (0.125 mg) oral tablet  -- 1 tab(s) by mouth once a day  -- Indication: For Heart failure    warfarin 5 mg oral tablet  -- 1 tab(s) by mouth once a day  -- Indication: For AFIB    glipiZIDE 10 mg oral tablet  -- 1 tab(s) by mouth once a day  -- Indication: For Diabetes    metFORMIN 1000 mg oral tablet  -- 1 tab(s) by mouth 2 times a day  -- Indication: For Diabetes    meclizine 25 mg oral tablet  -- 1 tab(s) by mouth 3 times a day  -- Indication: For DiZZINESS    colchicine-probenecid 0.5 mg-500 mg oral tablet  -- 1 tab(s) by mouth 2 times a day  -- Indication: For gout    atorvastatin 40 mg oral tablet  -- 1 tab(s) by mouth once a day (at bedtime)  -- Indication: For Hyperlipidemia    metoprolol tartrate 25 mg oral tablet  -- 1 tab(s) by mouth 2 times a day  -- Indication: For Hypertension    Lasix 40 mg oral tablet  -- 1 tab(s) by mouth once a day  -- Indication: For Heart failure    Slow Fe (as elemental iron) 45 mg oral tablet, extended release  -- 1 tab(s) by mouth once a day  -- Check with your doctor before becoming pregnant.  May discolor urine or feces.  Some non-prescription drugs may aggravate your condition.  Read all labels carefully.  If a warning appears, check with your doctor before taking.  Swallow whole.  Do not crush.    -- Indication: For Anemia I will START or STAY ON the medications listed below when I get home from the hospital:    aspirin 81 mg oral tablet  -- 1 tab(s) by mouth once a day  -- Indication: For cardioprotective    lisinopril 5 mg oral tablet  -- 1 tab(s) by mouth once a day  -- Indication: For Hypertension    digoxin 125 mcg (0.125 mg) oral tablet  -- 1 tab(s) by mouth once a day  -- Indication: For Heart failure    Coumadin 2.5 mg oral tablet  -- 1 tab(s) ( 2.5 mg) by mouth 2 times a week on saturday and sunday .   -- Do not take this drug if you are pregnant.  It is very important that you take or use this exactly as directed.  Do not skip doses or discontinue unless directed by your doctor.  Obtain medical advice before taking any non-prescription drugs as some may affect the action of this medication.    -- Indication: For Afib    Coumadin 2.5 mg oral tablet  -- 2 tab(s) by mouth ( 5 mg) Monday through Friday  -- Do not take this drug if you are pregnant.  It is very important that you take or use this exactly as directed.  Do not skip doses or discontinue unless directed by your doctor.  Obtain medical advice before taking any non-prescription drugs as some may affect the action of this medication.    -- Indication: For Afib    glipiZIDE 10 mg oral tablet  -- 1 tab(s) by mouth once a day  -- Indication: For Diabetes    metFORMIN 1000 mg oral tablet  -- 1 tab(s) by mouth 2 times a day  -- Indication: For Diabetes    meclizine 25 mg oral tablet  -- 1 tab(s) by mouth 3 times a day  -- Indication: For DiZZINESS    colchicine-probenecid 0.5 mg-500 mg oral tablet  -- 1 tab(s) by mouth 2 times a day  -- Indication: For gout    atorvastatin 40 mg oral tablet  -- 1 tab(s) by mouth once a day (at bedtime)  -- Indication: For Hyperlipidemia    metoprolol tartrate 25 mg oral tablet  -- 1 tab(s) by mouth 2 times a day  -- Indication: For Hypertension    Lasix 40 mg oral tablet  -- 1 tab(s) by mouth once a day  -- Indication: For Heart failure    Slow Fe (as elemental iron) 45 mg oral tablet, extended release  -- 1 tab(s) by mouth once a day  -- Check with your doctor before becoming pregnant.  May discolor urine or feces.  Some non-prescription drugs may aggravate your condition.  Read all labels carefully.  If a warning appears, check with your doctor before taking.  Swallow whole.  Do not crush.    -- Indication: For Anemia I will START or STAY ON the medications listed below when I get home from the hospital:    INR to be checked   -- Please check the INR at PCP office on [place] / tuesday   -- Indication: For Afib     aspirin 81 mg oral tablet  -- 1 tab(s) by mouth once a day  -- Indication: For cardioprotective    lisinopril 5 mg oral tablet  -- 1 tab(s) by mouth once a day  -- Indication: For Hypertension    digoxin 125 mcg (0.125 mg) oral tablet  -- 1 tab(s) by mouth once a day  -- Indication: For Heart failure    Coumadin 2.5 mg oral tablet  -- 1 tab(s) ( 2.5 mg) by mouth 2 times a week on saturday and sunday .   -- Do not take this drug if you are pregnant.  It is very important that you take or use this exactly as directed.  Do not skip doses or discontinue unless directed by your doctor.  Obtain medical advice before taking any non-prescription drugs as some may affect the action of this medication.    -- Indication: For Afib    Coumadin 2.5 mg oral tablet  -- 2 tab(s) by mouth ( 5 mg) Monday through Friday  -- Do not take this drug if you are pregnant.  It is very important that you take or use this exactly as directed.  Do not skip doses or discontinue unless directed by your doctor.  Obtain medical advice before taking any non-prescription drugs as some may affect the action of this medication.    -- Indication: For Afib    glipiZIDE 10 mg oral tablet  -- 1 tab(s) by mouth once a day  -- Indication: For Diabetes    metFORMIN 1000 mg oral tablet  -- 1 tab(s) by mouth 2 times a day  -- Indication: For Diabetes    meclizine 25 mg oral tablet  -- 1 tab(s) by mouth 3 times a day  -- Indication: For DiZZINESS    colchicine-probenecid 0.5 mg-500 mg oral tablet  -- 1 tab(s) by mouth 2 times a day  -- Indication: For gout    atorvastatin 40 mg oral tablet  -- 1 tab(s) by mouth once a day (at bedtime)  -- Indication: For Hyperlipidemia    metoprolol tartrate 25 mg oral tablet  -- 1 tab(s) by mouth 2 times a day  -- Indication: For Hypertension    Lasix 40 mg oral tablet  -- 1 tab(s) by mouth once a day  -- Indication: For Heart failure    Slow Fe (as elemental iron) 45 mg oral tablet, extended release  -- 1 tab(s) by mouth once a day  -- Check with your doctor before becoming pregnant.  May discolor urine or feces.  Some non-prescription drugs may aggravate your condition.  Read all labels carefully.  If a warning appears, check with your doctor before taking.  Swallow whole.  Do not crush.    -- Indication: For Anemia

## 2017-06-20 NOTE — DISCHARGE NOTE ADULT - PLAN OF CARE
prevent the stroke You heart rate was very high initially, later upon resumption of home meds , better controlled. Coumadin, digoxin, metoprolol started . You need to take the medications regularly. Your ECHO shows valvular abnormality. 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 Diuresed with IV lasix. prevent from worsening Your kidney function was abnormal , later normalized. You need to avoid the nephrotoxic medications You were given the antibiotic zithromax , your RVP positive for enterovirus, your condition improve later on. Keep the Hb > 10 You have iron deficiency anemia , stool occult was negative , no active signs of bleeding found , you were given the FeSo4 supplements. Keep the fingertsicks around 140-160 Your hBA1c was 6.6, home medications kept on hold, HSS scale given , blood sugar well controlled, upon discharge continue with home medications. Keep the bp around the 130/80 Initially your BP was very high , likely as you ae not compliant with your home meds, later BP well controlled, need to take the meds as prescribed. 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 Diuresed with IV lasix. you can continue the home dose of lasix You were given the antibiotic Zithromax , your RVP positive for enterovirus, your condition improve later on. take Zithromax for one more day. You heart rate was very high initially, later upon resumption of home meds , better controlled. Coumadin, digoxin, metoprolol started . You need to take the medications regularly. Your ECHO shows valvular abnormality. Takes 5 mg of coumadin at home Initially your BP was very high , likely as you ae not compliant with your home meds, later BP well controlled, need to take the meds as prescribed. take metorpolol 25 bid. 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 Diuresed with IV lasix. you can continue the home dose of lasix 40 mg daily You were given the antibiotic Zithromax , your RVP positive for enterovirus, your condition improve later on. Completed the treatment Your kidney function was abnormal but improved and stable; please follow up with your PMD for continued care and monitoring of your kidney functions. You have iron deficiency anemia , stool occult was negative , no active signs of bleeding found , you were given the iron supplements. Keep the fingersticks around 140-160 Your HBA1c was 6.6, home medications kept on hold, HSS scale given , blood sugar well controlled, upon discharge continue with your diabetic home medications. Initially your BP was very high , likely as you ae not compliant with your home meds, later BP well controlled, need to take the meds as prescribed. take metoprolol 25 bid. Your ECHO shows combined  systolic and diastolic heart failure. Diuresed with IV Lasix for acute combined heart failure with significant improvement in symptoms. Continue with Lasix 40 mg daily upon discharge You were treated with antibiotic (Zithromax) for bronchitis and your symptoms improved. You heart rate was very high initially, later upon resumption of home meds , better controlled. Coumadin, digoxin, metoprolol started . You need to take the medications regularly. Your ECHO shows valvular abnormality. Takes 5 mg of coumadin Monday through Friday , and 2.5 mg on Saturday and Sunday

## 2017-06-20 NOTE — PROGRESS NOTE ADULT - PROBLEM SELECTOR PLAN 10
- Microcytic anemia , Hb stable around 10, iron deficiency ( iron saturation low , % saturation low )    - will give venofer .   - F/up on occult blood - IMPROVE VTE is 2  - sc heparin for DVT ppx

## 2017-06-20 NOTE — PROGRESS NOTE ADULT - SUBJECTIVE AND OBJECTIVE BOX
Patient is a 76y old  Female who presents with a chief complaint of cough, weakness (20 Jun 2017 06:23)      INTERVAL HPI/OVERNIGHT EVENTS: no new complaints , reports improvement in symptoms.       REVIEW OF SYSTEMS:    CONSTITUTIONAL: No fever,   EYES: no acute visual disturbances  NECK: No pain or stiffness  RESPIRATORY: No cough; No shortness of breath  CARDIOVASCULAR: No chest pain, no palpitations  GASTROINTESTINAL: No pain. No nausea or vomiting; No diarrhea   NEUROLOGICAL: No headache or numbness, no tremors  HEME/LYMPH: No  bleeding gums    Vital Signs Last 24 Hrs  T(C): 36.9, Max: 37.1 (06-19 @ 15:24)  T(F): 98.4, Max: 98.7 (06-19 @ 15:24)  HR: 84 (79 - 113)  BP: 149/91 (104/67 - 214/130)  BP(mean): --  RR: 16 (16 - 19)  SpO2: 100% (98% - 100%)    ________________________________________________  PHYSICAL EXAM:  GENERAL: NAD,   HEAD:  , Normocephalic  EYES:  conjunctiva and sclera clear  NECK: Supple, No JVD    NERVOUS SYSTEM:  Alert & Oriented X3,   CHEST/LUNG: Clear to auscultation bilaterally;   HEART: S1 S2+;   ABDOMEN: Soft, Nontender, Nondistended; Bowel sounds present  EXTREMITIES: no cyanosis; no edema; no calf tenderness    _________________________________________________  LABS:                        10.0   8.4   )-----------( 234      ( 20 Jun 2017 06:15 )             33.6     06-20    142  |  103  |  16  ----------------------------<  114<H>  3.9   |  31  |  1.44<H>    Ca    9.1      20 Jun 2017 06:15  Phos  3.5     06-20  Mg     2.1     06-20    TPro  7.1  /  Alb  3.3<L>  /  TBili  0.8  /  DBili  x   /  AST  15  /  ALT  15  /  AlkPhos  59  06-20    PT/INR - ( 20 Jun 2017 10:50 )   PT: 14.1 sec;   INR: 1.29 ratio           CAPILLARY BLOOD GLUCOSE  157 (20 Jun 2017 11:19)  130 (20 Jun 2017 07:34)  153 (19 Jun 2017 19:15)  133 (19 Jun 2017 15:35) Patient is a 76y old  Female who presents with a chief complaint of cough, weakness (20 Jun 2017 06:23)      INTERVAL HPI/OVERNIGHT EVENTS: No new complaints , reports improvement in symptoms.       REVIEW OF SYSTEMS:    CONSTITUTIONAL: No fever,   EYES: no acute visual disturbances  NECK: No pain or stiffness  RESPIRATORY: No cough; No shortness of breath  CARDIOVASCULAR: No chest pain, no palpitations  GASTROINTESTINAL: No pain. No nausea or vomiting; No diarrhea   NEUROLOGICAL: No headache or numbness, no tremors  HEME/LYMPH: No  bleeding gums    Vital Signs Last 24 Hrs  T(C): 36.9, Max: 37.1 (06-19 @ 15:24)  T(F): 98.4, Max: 98.7 (06-19 @ 15:24)  HR: 84 (79 - 113)  BP: 149/91 (104/67 - 214/130)  BP(mean): --  RR: 16 (16 - 19)  SpO2: 100% (98% - 100%)    ________________________________________________  PHYSICAL EXAM:  GENERAL: NAD,   HEAD:  , Normocephalic  EYES:  conjunctiva and sclera clear  NECK: Supple, No JVD    NERVOUS SYSTEM:  Alert & Oriented X3,   CHEST/LUNG: Clear to auscultation bilaterally;   HEART: S1 S2+;   ABDOMEN: Soft, Nontender, Nondistended; Bowel sounds present  EXTREMITIES: no cyanosis; no edema; no calf tenderness    _________________________________________________  LABS:                        10.0   8.4   )-----------( 234      ( 20 Jun 2017 06:15 )             33.6     06-20    142  |  103  |  16  ----------------------------<  114<H>  3.9   |  31  |  1.44<H>    Ca    9.1      20 Jun 2017 06:15  Phos  3.5     06-20  Mg     2.1     06-20    TPro  7.1  /  Alb  3.3<L>  /  TBili  0.8  /  DBili  x   /  AST  15  /  ALT  15  /  AlkPhos  59  06-20    PT/INR - ( 20 Jun 2017 10:50 )   PT: 14.1 sec;   INR: 1.29 ratio           CAPILLARY BLOOD GLUCOSE  157 (20 Jun 2017 11:19)  130 (20 Jun 2017 07:34)  153 (19 Jun 2017 19:15)  133 (19 Jun 2017 15:35) Patient is a 76y old  Female who presents with a chief complaint of cough, weakness (20 Jun 2017 06:23)      INTERVAL HPI/OVERNIGHT EVENTS: No new complaints , reports improvement in symptoms.       REVIEW OF SYSTEMS:    CONSTITUTIONAL: No fever,   EYES: no acute visual disturbances  NECK: No pain or stiffness  RESPIRATORY: No cough; No shortness of breath  CARDIOVASCULAR: No chest pain, no palpitations  GASTROINTESTINAL: No pain. No nausea or vomiting; reports having two black bowel movement.  NEUROLOGICAL: No headache or numbness, no tremors  HEME/LYMPH: No  bleeding gums    Vital Signs Last 24 Hrs  T(C): 36.9, Max: 37.1 (06-19 @ 15:24)  T(F): 98.4, Max: 98.7 (06-19 @ 15:24)  HR: 84 (79 - 113)  BP: 149/91 (104/67 - 214/130)  BP(mean): --  RR: 16 (16 - 19)  SpO2: 100% (98% - 100%)    ________________________________________________  PHYSICAL EXAM:  GENERAL: NAD,   HEAD:  , Normocephalic  EYES:  conjunctiva and sclera clear  NECK: Supple, No JVD    NERVOUS SYSTEM:  Alert & Oriented X3,   CHEST/LUNG: Clear to auscultation bilaterally;   HEART: S1 S2+;   ABDOMEN: Soft, Nontender, Nondistended; Bowel sounds present  EXTREMITIES: no cyanosis; 1 + pitting edema; no calf tenderness    _________________________________________________  LABS:                        10.0   8.4   )-----------( 234      ( 20 Jun 2017 06:15 )             33.6     06-20    142  |  103  |  16  ----------------------------<  114<H>  3.9   |  31  |  1.44<H>    Ca    9.1      20 Jun 2017 06:15  Phos  3.5     06-20  Mg     2.1     06-20    TPro  7.1  /  Alb  3.3<L>  /  TBili  0.8  /  DBili  x   /  AST  15  /  ALT  15  /  AlkPhos  59  06-20    PT/INR - ( 20 Jun 2017 10:50 )   PT: 14.1 sec;   INR: 1.29 ratio           CAPILLARY BLOOD GLUCOSE  157 (20 Jun 2017 11:19)  130 (20 Jun 2017 07:34)  153 (19 Jun 2017 19:15)  133 (19 Jun 2017 15:35)

## 2017-06-20 NOTE — PROGRESS NOTE ADULT - PROBLEM SELECTOR PLAN 6
- patient has supra therapeutic INR sec to non compliance. '  - will give dose of coumadin 7.5 tonight   - resume home dose of warfarin once INR therapeutic   - f/u Pt INR in am for dose titration -Got 7.5 mg of coumadin yesterday , INR still subtherapeutic   - will give dose of coumadin 7.5 tonight

## 2017-06-20 NOTE — PROGRESS NOTE ADULT - ATTENDING COMMENTS
Patient was seen and examined by myself with team. Case was discussed with house staff in details.  Plan discussed with patient and family in details at bedside and all their questions / concerns were addressed  77 y/o F with   1. AFIB on coumadin with RVR  2. Acute on chronic diastolic and systolic heart failure  3. Diarrhea  4. anemia  5. ARF on CKD 3  6. Subtherapeutic INR  7. Medication non compliance  Plan as outlined above.

## 2017-06-20 NOTE — PROGRESS NOTE ADULT - PROBLEM SELECTOR PLAN 4
-Creatinine trended down  - likely sec to diuretics (contraction alkalosis and hypokalemia)  - hold nephrotoxic drugs   - BUN/Cr in am   - f/u UA and urine lytes '  - renal US if persists   - replace and monitor electrolyses -Creatinine trended down  - hold nephrotoxic drugs -Creatinine trended up to 1.44  - hold nephrotoxic drugs  - Monitor the creatinine - Rvp positive for enterovirus and CXR shows a new LLL atelectasis   - Continue with Zithromax for CAP  ( day 2)   - No WBC count , fever   - Tylenol as needed for fever

## 2017-06-20 NOTE — DISCHARGE NOTE ADULT - HOSPITAL COURSE
75 y/o female from home, walks with a cane at baseline, with PMHx of DM, HTN, (A Fib on coumadin) CVA (chronic occipital infarct) vertigo and ?CHF(on lasix and lisinopril at home) and PSx of removal of a cyst in the back 6 years ago presented with generalized body aches and fever since 3 days.       Admitted for Atrial fibrillation. BRAYAN VASC of 4 .Given Cardizem resume the home meds. Heart rate better controlled on telemetry .Continued with digoxin 0,125 mg daily, lopressor 25 mg BID. Continued with aspirin and lipitor. Patient was non-compliant with the meds, upon resumption of home meds , condition improves. ECHO shows moderate severe mitral regurgitation, mild aortic stenosis , moderate left atrial enlargement, moderate global LV dysfunction , grade 2 diastolic dysfunction, moderate severe tricuspid regurgitation. Valvular pathology seen.  Heart failure, acute, systolic and diastolic. Diuresed with  IV lasix 40 mg. For concern of chest pain, admitted to telemetry for ruling out ACS.  Mild troponin elevation 0.059--0.060---0.049, likely due to demand ischemia. continue aspirin, statin and beta-blocker(atenolol) as per ACS protocol. Has  Pneumonia. Rvp positive for enterovirus and CXR shows a new LLL atelectasis. Given  Zithromax for CAP . No WBC count , fever. Has SHELBY (acute kidney injury) on CRF. . Creatinine trended up to 1.44. hold nephrotoxic drugs. INR (international normal ratio) abnormal, sub therapeutic , shows medication non-compliance.  Coumadin dose adjusted. INR monitored frequently. For . Hypertension continued lisinopril 5 mg and lopressor 25 mg BID with parameters. For Diabetes. hold metformin and glipizide  continue HSS. Has  Anemia. Microcytic anemia , Hb stable around 10, iron deficiency ( iron saturation low , % saturation low )  given FeSo4 . Occult blood negative . Reported to have black bowel movements , Hb has been stable. For DVT prophylaxis, heparin given. 77 y/o female from home, walks with a cane at baseline, with PMHx of DM, HTN, (A Fib on coumadin) CVA (chronic occipital infarct) vertigo and ?CHF(on lasix and lisinopril at home) and PSx of removal of a cyst in the back 6 years ago presented with generalized body aches and fever since 3 days.       Admitted for Atrial fibrillation. BRAYAN VASC of 4 .Given Cardizem resume the home meds. Heart rate better controlled on telemetry .Continued with digoxin 0,125 mg daily, lopressor 25 mg BID. Continued with aspirin and lipitor. Patient was non-compliant with the meds, upon resumption of home meds , condition improves. ECHO shows moderate severe mitral regurgitation, mild aortic stenosis , moderate left atrial enlargement, moderate global LV dysfunction , grade 2 diastolic dysfunction, moderate severe tricuspid regurgitation. Valvular pathology seen.  Heart failure, acute, systolic and diastolic. Diuresed with  IV lasix 40 mg. For concern of chest pain, admitted to telemetry for ruling out ACS.  Mild troponin elevation 0.059--0.060---0.049, likely due to demand ischemia. continue aspirin, statin and beta-blocker(atenolol) as per ACS protocol. Has  Pneumonia. Rvp positive for enterovirus and CXR shows a new LLL atelectasis. Given  Zithromax for CAP . No WBC count , fever. Has SHELBY (acute kidney injury) on CRF. . Creatinine trended up to 1.44. hold nephrotoxic drugs. INR (international normal ratio) abnormal, sub therapeutic , shows medication non-compliance.  Coumadin dose adjusted. INR monitored frequently. For . Hypertension continued lisinopril 5 mg and lopressor 25 mg BID with parameters. For Diabetes. hold metformin and glipizide  continue HSS. Has  Anemia. Microcytic anemia , Hb stable around 10, iron deficiency ( iron saturation low , % saturation low )  given FeSo4 . Occult blood negative . Reported to have black bowel movements , Hb has been stable. For DVT prophylaxis, heparin given.     Patient get stable over time ,diuresed well with IV lasix, INR therapeutic upon discharge . You need to continue with current medication------------. Outpatient f/up with PCP recommended. 77 y/o female from home, walks with a cane at baseline, with PMHx of DM, HTN, (A Fib on coumadin) CVA (chronic occipital infarct) vertigo and ?CHF(on lasix and lisinopril at home) and PSx of removal of a cyst in the back 6 years ago presented with generalized body aches and fever since 3 days.       Admitted for Atrial fibrillation. BRAYAN VASC of 4 .Given Cardizem resume the home meds. Heart rate better controlled on telemetry .Continued with digoxin 0,125 mg daily, lopressor 25 mg BID. Continued with aspirin and lipitor. Patient was non-compliant with the meds, upon resumption of home meds , condition improves. ECHO shows moderate severe mitral regurgitation, mild aortic stenosis , moderate left atrial enlargement, moderate global LV dysfunction , grade 2 diastolic dysfunction, moderate severe tricuspid regurgitation. Valvular pathology seen.  Heart failure, acute, systolic and diastolic. Diuresed with  IV lasix 40 mg. For concern of chest pain, admitted to telemetry for ruling out ACS.  Mild troponin elevation 0.059--0.060---0.049, likely due to demand ischemia. continue aspirin, statin and beta-blocker(atenolol) as per ACS protocol. Has  Pneumonia. Rvp positive for enterovirus and CXR shows a new LLL atelectasis. Given  Zithromax for CAP . No WBC count , fever. Has SHELBY (acute kidney injury) on CRF. . Creatinine trended up to 1.44. hold nephrotoxic drugs. INR (international normal ratio) abnormal, sub therapeutic , shows medication non-compliance.  Coumadin dose adjusted. INR monitored frequently. For . Hypertension continued lisinopril 5 mg and lopressor 25 mg BID with parameters. For Diabetes. hold metformin and glipizide  continue HSS. Has  Anemia. Microcytic anemia , Hb stable around 10, iron deficiency ( iron saturation low , % saturation low )  given FeSo4 . Occult blood negative . Reported to have black bowel movements , Hb has been stable. For DVT prophylaxis, heparin given. As you have abdominal pain , CTscan done that shows no evidence for mechanical bowel obstruction. No colonic wall thickening identified. No free intraperitoneal air or fluid demonstrate. Has 8 mm calculous in mid pole region right kidney , 1.4 cm cyst  upper pole left kidney, 3 mm nodule  right lower lung lobe , small nodular opacities in left lower lobe lung pole.     Patient get stable over time ,diuresed well with IV lasix, INR therapeutic upon discharge . You need to continue with current medication coumadin 5 mg. Recheck the INR in 3 days . Outpatient f/up with PCP recommended. 75 y/o female from home, walks with a cane at baseline, with PMHx of DM, HTN, (A Fib on coumadin) CVA (chronic occipital infarct) vertigo and ?CHF(on Lasix and lisinopril at home) and PSx of removal of a cyst in the back 6 years ago presented with generalized body aches and fever since 3 days.       Admitted for Atrial fibrillation. BRAYAN VASC of 4 .Given Cardizem resume the home meds. Heart rate better controlled on telemetry .Continued with digoxin 0,125 mg daily, lopressor 25 mg BID. Continued with aspirin and Lipitor Patient was non-compliant with the meds, upon resumption of home meds , condition improves. ECHO shows moderate severe mitral regurgitation, mild aortic stenosis , moderate left atrial enlargement, moderate global LV dysfunction , grade 2 diastolic dysfunction, moderate severe tricuspid regurgitation. Valvular pathology seen.  Heart failure, acute, systolic and diastolic. Diuresed with  IV Lasix 40 mg. For concern of chest pain, admitted to telemetry for ruling out ACS.  Mild troponin elevation 0.059--0.060---0.049, likely due to demand ischemia. Continue aspirin, statin and beta-blocker(atenolol) as per ACS protocol. Has  Pneumonia. Rvp positive for enterovirus and CXR shows a new LLL atelectasis. Given  Zithromax for CAP . No WBC count , fever. Has SHELBY (acute kidney injury) on CRF. . Creatinine trended up to 1.44. hold nephrotoxic drugs. INR (international normal ratio) abnormal, sub therapeutic , shows medication non-compliance.  Coumadin dose adjusted. INR monitored frequently. For . Hypertension continued lisinopril 5 mg and lopressor 25 mg BID with parameters. For Diabetes. hold metformin and glipizide  continue HSS. Has  Anemia. Microcytic anemia , Hb stable around 10, iron deficiency ( iron saturation low , % saturation low )  given FeSo4 . Occult blood negative . Reported to have black bowel movements , Hb has been stable. For DVT prophylaxis, heparin given. As you have abdominal pain , CT scan done that shows no evidence for mechanical bowel obstruction. No colonic wall thickening identified. No free intraperitoneal air or fluid demonstrate. Has 8 mm calculous in mid pole region right kidney , 1.4 cm cyst  upper pole left kidney, 3 mm nodule  right lower lung lobe , small nodular opacities in left lower lobe lung pole.     Patient get stable over time ,diuresed well with IV Lasix INR therapeutic upon discharge . You need to continue with current medication coumadin 5 mg. Recheck the INR in 3 days . Outpatient f/up with PCP recommended.     ATTENDING ADDENDUM:  Patient's symptoms of rapid AFIB and acute heart failure likely as a result of medication non compliance.  She has been counselled about need for compliance and will follow up with her PMD for further care and monitoring outpatient. 77 y/o female from home, walks with a cane at baseline, with PMHx of DM, HTN, (A Fib on coumadin) CVA (chronic occipital infarct) vertigo and ?CHF(on Lasix and lisinopril at home) and PSx of removal of a cyst in the back 6 years ago presented with generalized body aches and fever since 3 days.       Admitted for Atrial fibrillation. BRAYAN VASC of 4 .Given Cardizem resume the home meds. Heart rate better controlled on telemetry .Continued with digoxin 0,125 mg daily, lopressor 25 mg BID. Continued with aspirin and Lipitor Patient was non-compliant with the meds, upon resumption of home meds , condition improves. ECHO shows moderate severe mitral regurgitation, mild aortic stenosis , moderate left atrial enlargement, moderate global LV dysfunction , grade 2 diastolic dysfunction, moderate severe tricuspid regurgitation. Valvular pathology seen.  Heart failure, acute, systolic and diastolic. Diuresed with  IV Lasix 40 mg. For concern of chest pain, admitted to telemetry for ruling out ACS.  Mild troponin elevation 0.059--0.060---0.049, likely due to demand ischemia. Continue aspirin, statin and beta-blocker(atenolol) as per ACS protocol. Has  Pneumonia. Rvp positive for enterovirus and CXR shows a new LLL atelectasis. Given  Zithromax for CAP . No WBC count , fever. Has SHELBY (acute kidney injury) on CRF. . Creatinine trended up to 1.44. hold nephrotoxic drugs. INR (international normal ratio) abnormal, sub therapeutic , shows medication non-compliance.  Coumadin dose adjusted. INR monitored frequently. For . Hypertension continued lisinopril 5 mg and lopressor 25 mg BID with parameters. For Diabetes. hold metformin and glipizide  continue HSS. Has  Anemia. Microcytic anemia , Hb stable around 10, iron deficiency ( iron saturation low , % saturation low )  given FeSo4 . Occult blood negative . Reported to have black bowel movements , Hb has been stable. For DVT prophylaxis, heparin given. As you have abdominal pain , CT scan done that shows no evidence for mechanical bowel obstruction. No colonic wall thickening identified. No free intraperitoneal air or fluid demonstrate. Has 8 mm calculous in mid pole region right kidney , 1.4 cm cyst  upper pole left kidney, 3 mm nodule  right lower lung lobe , small nodular opacities in left lower lobe lung pole.     Patient get stable over time ,diuresed well with IV Lasix INR therapeutic upon discharge . You need to continue with current medication coumadin 5 mg monday through friday , and 2.5 mg on saturday and sunday. . Recheck the INR in 3 days . Outpatient f/up with PCP recommended.     ATTENDING ADDENDUM:  Patient's symptoms of rapid AFIB and acute heart failure likely as a result of medication non compliance.  She has been counselled about need for compliance and will follow up with her PMD for further care and monitoring outpatient.

## 2017-06-20 NOTE — DIETITIAN INITIAL EVALUATION ADULT. - NS AS NUTRI INTERV MEALS SNACK
Diets modified for specific foods and ingredients/Composition of meals/snacks/change diet to Consistent CHO DASH diet as medically feasible

## 2017-06-20 NOTE — DISCHARGE NOTE ADULT - CARE PLAN
Principal Discharge DX:	Atrial fibrillation  Goal:	prevent the stroke  Instructions for follow-up, activity and diet:	You heart rate was very high initially, later upon resumption of home meds , better controlled. Coumadin, digoxin, metoprolol started . You need to take the medications regularly. Your ECHO shows valvular abnormality.  Secondary Diagnosis:	Heart failure, acute, systolic and diastolic  Goal:	Prevent from exacerbation  Instructions for follow-up, activity and diet:	Your ECHO shows combined acute on chronic systolic and diastolic heart failure. Diuresed with IV lasix.  Secondary Diagnosis:	SHELBY (acute kidney injury)  Goal:	prevent from worsening  Instructions for follow-up, activity and diet:	Your kidney function was abnormal , later normalized. You need to avoid the nephrotoxic medications  Secondary Diagnosis:	Pneumonia  Goal:	prevent from worsening  Instructions for follow-up, activity and diet:	You were given the antibiotic zithromax , your RVP positive for enterovirus, your condition improve later on.  Secondary Diagnosis:	Anemia  Goal:	Keep the Hb > 10  Instructions for follow-up, activity and diet:	You have iron deficiency anemia , stool occult was negative , no active signs of bleeding found , you were given the FeSo4 supplements.  Secondary Diagnosis:	Diabetes  Goal:	Keep the fingertsicks around 140-160  Instructions for follow-up, activity and diet:	Your hBA1c was 6.6, home medications kept on hold, HSS scale given , blood sugar well controlled, upon discharge continue with home medications.  Secondary Diagnosis:	Hypertension  Goal:	Keep the bp around the 130/80  Instructions for follow-up, activity and diet:	Initially your BP was very high , likely as you ae not compliant with your home meds, later BP well controlled, need to take the meds as prescribed. Principal Discharge DX:	Atrial fibrillation  Goal:	prevent the stroke  Instructions for follow-up, activity and diet:	You heart rate was very high initially, later upon resumption of home meds , better controlled. Coumadin, digoxin, metoprolol started . You need to take the medications regularly. Your ECHO shows valvular abnormality.  Secondary Diagnosis:	Heart failure, acute, systolic and diastolic  Goal:	Prevent from exacerbation  Instructions for follow-up, activity and diet:	Your ECHO shows combined acute on chronic systolic and diastolic heart failure. Diuresed with IV lasix. you can continue the home dose of lasix  Secondary Diagnosis:	SHELBY (acute kidney injury)  Goal:	prevent from worsening  Instructions for follow-up, activity and diet:	Your kidney function was abnormal , later normalized. You need to avoid the nephrotoxic medications  Secondary Diagnosis:	Pneumonia  Goal:	prevent from worsening  Instructions for follow-up, activity and diet:	You were given the antibiotic Zithromax , your RVP positive for enterovirus, your condition improve later on. take Zithromax for one more day.  Secondary Diagnosis:	Anemia  Goal:	Keep the Hb > 10  Instructions for follow-up, activity and diet:	You have iron deficiency anemia , stool occult was negative , no active signs of bleeding found , you were given the FeSo4 supplements.  Secondary Diagnosis:	Diabetes  Goal:	Keep the fingertsicks around 140-160  Instructions for follow-up, activity and diet:	Your hBA1c was 6.6, home medications kept on hold, HSS scale given , blood sugar well controlled, upon discharge continue with home medications.  Secondary Diagnosis:	Hypertension  Goal:	Keep the bp around the 130/80  Instructions for follow-up, activity and diet:	Initially your BP was very high , likely as you ae not compliant with your home meds, later BP well controlled, need to take the meds as prescribed. Principal Discharge DX:	Atrial fibrillation  Goal:	prevent the stroke  Instructions for follow-up, activity and diet:	You heart rate was very high initially, later upon resumption of home meds , better controlled. Coumadin, digoxin, metoprolol started . You need to take the medications regularly. Your ECHO shows valvular abnormality. Takes 5 mg of coumadin at home  Secondary Diagnosis:	Heart failure, acute, systolic and diastolic  Goal:	Prevent from exacerbation  Instructions for follow-up, activity and diet:	Your ECHO shows combined acute on chronic systolic and diastolic heart failure. Diuresed with IV lasix. you can continue the home dose of lasix 40 mg daily  Secondary Diagnosis:	SHELBY (acute kidney injury)  Goal:	prevent from worsening  Instructions for follow-up, activity and diet:	Your kidney function was abnormal , later normalized. You need to avoid the nephrotoxic medications  Secondary Diagnosis:	Pneumonia  Goal:	prevent from worsening  Instructions for follow-up, activity and diet:	You were given the antibiotic Zithromax , your RVP positive for enterovirus, your condition improve later on. Completed the treatment  Secondary Diagnosis:	Anemia  Goal:	Keep the Hb > 10  Instructions for follow-up, activity and diet:	You have iron deficiency anemia , stool occult was negative , no active signs of bleeding found , you were given the FeSo4 supplements.  Secondary Diagnosis:	Diabetes  Goal:	Keep the fingertsicks around 140-160  Instructions for follow-up, activity and diet:	Your hBA1c was 6.6, home medications kept on hold, HSS scale given , blood sugar well controlled, upon discharge continue with home medications.  Secondary Diagnosis:	Hypertension  Goal:	Keep the bp around the 130/80  Instructions for follow-up, activity and diet:	Initially your BP was very high , likely as you ae not compliant with your home meds, later BP well controlled, need to take the meds as prescribed. take metorpolol 25 bid. Principal Discharge DX:	Atrial fibrillation  Goal:	prevent the stroke  Instructions for follow-up, activity and diet:	You heart rate was very high initially, later upon resumption of home meds , better controlled. Coumadin, digoxin, metoprolol started . You need to take the medications regularly. Your ECHO shows valvular abnormality. Takes 5 mg of coumadin at home  Secondary Diagnosis:	Heart failure, acute, systolic and diastolic  Goal:	Prevent from exacerbation  Instructions for follow-up, activity and diet:	Your ECHO shows combined  systolic and diastolic heart failure. Diuresed with IV Lasix for acute combined heart failure with significant improvement in symptoms. Continue with Lasix 40 mg daily upon discharge  Secondary Diagnosis:	SHELBY (acute kidney injury)  Goal:	prevent from worsening  Instructions for follow-up, activity and diet:	Your kidney function was abnormal but improved and stable; please follow up with your PMD for continued care and monitoring of your kidney functions.  Secondary Diagnosis:	Pneumonia  Goal:	prevent from worsening  Instructions for follow-up, activity and diet:	You were treated with antibiotic (Zithromax) for bronchitis and your symptoms improved.  Secondary Diagnosis:	Anemia  Goal:	Keep the Hb > 10  Instructions for follow-up, activity and diet:	You have iron deficiency anemia , stool occult was negative , no active signs of bleeding found , you were given the iron supplements.  Secondary Diagnosis:	Diabetes  Goal:	Keep the fingersticks around 140-160  Instructions for follow-up, activity and diet:	Your HBA1c was 6.6, home medications kept on hold, HSS scale given , blood sugar well controlled, upon discharge continue with your diabetic home medications.  Secondary Diagnosis:	Hypertension  Goal:	Keep the bp around the 130/80  Instructions for follow-up, activity and diet:	Initially your BP was very high , likely as you ae not compliant with your home meds, later BP well controlled, need to take the meds as prescribed. take metoprolol 25 bid. Principal Discharge DX:	Atrial fibrillation  Goal:	prevent the stroke  Instructions for follow-up, activity and diet:	You heart rate was very high initially, later upon resumption of home meds , better controlled. Coumadin, digoxin, metoprolol started . You need to take the medications regularly. Your ECHO shows valvular abnormality. Takes 5 mg of coumadin Monday through Friday , and 2.5 mg on Saturday and Sunday  Secondary Diagnosis:	Heart failure, acute, systolic and diastolic  Goal:	Prevent from exacerbation  Instructions for follow-up, activity and diet:	Your ECHO shows combined  systolic and diastolic heart failure. Diuresed with IV Lasix for acute combined heart failure with significant improvement in symptoms. Continue with Lasix 40 mg daily upon discharge  Secondary Diagnosis:	SHELBY (acute kidney injury)  Goal:	prevent from worsening  Instructions for follow-up, activity and diet:	Your kidney function was abnormal but improved and stable; please follow up with your PMD for continued care and monitoring of your kidney functions.  Secondary Diagnosis:	Pneumonia  Goal:	prevent from worsening  Instructions for follow-up, activity and diet:	You were treated with antibiotic (Zithromax) for bronchitis and your symptoms improved.  Secondary Diagnosis:	Anemia  Goal:	Keep the Hb > 10  Instructions for follow-up, activity and diet:	You have iron deficiency anemia , stool occult was negative , no active signs of bleeding found , you were given the iron supplements.  Secondary Diagnosis:	Diabetes  Goal:	Keep the fingersticks around 140-160  Instructions for follow-up, activity and diet:	Your HBA1c was 6.6, home medications kept on hold, HSS scale given , blood sugar well controlled, upon discharge continue with your diabetic home medications.  Secondary Diagnosis:	Hypertension  Goal:	Keep the bp around the 130/80  Instructions for follow-up, activity and diet:	Initially your BP was very high , likely as you ae not compliant with your home meds, later BP well controlled, need to take the meds as prescribed. take metoprolol 25 bid.

## 2017-06-20 NOTE — PROGRESS NOTE ADULT - PROBLEM SELECTOR PLAN 3
- Mild troponin elevation 0.059--0.060---0.049, likely due to demand ischemia.   - continue aspirin, statin and beta-blocker(atenolol) as per ACS protocol.

## 2017-06-20 NOTE — DISCHARGE NOTE ADULT - NS AS DC HF EDUCATION INSTRUCTIONS
Report weight gain of 2 or more pounds in one day or 3 or more pounds in one week, worsening shortness of breath, fatigue, weakness, increased swelling of hands and feet to primary care provider/Low salt diet/Call Primary Care Provider for follow-up after discharge/Activities as tolerated/Monitor Weight Daily

## 2017-06-20 NOTE — PROGRESS NOTE ADULT - PROBLEM SELECTOR PLAN 7
- continue lisinopril and lopressor with parameters - continue lisinopril 5 mg and lopressor 25 mg BID with parameters

## 2017-06-20 NOTE — PROGRESS NOTE ADULT - PROBLEM SELECTOR PLAN 9
- IMPROVE VTE is 2  - sc heparin for DVT ppx - Microcytic anemia , Hb stable around 10, iron deficiency ( iron saturation low , % saturation low )    - will give FeSo4   - Occult blood negative   - Patient reports he has two episodes of black bowel movement today , will check CBC along with type and screen   - If drop < 7 , can give 1 U PRBC.

## 2017-06-20 NOTE — DIETITIAN INITIAL EVALUATION ADULT. - NUTRITION INTERVENTION
Feeding Assistance/Medical Food Supplements/Meals and Snack/Collaboration and Referral of Nutrition Care

## 2017-06-20 NOTE — DISCHARGE NOTE ADULT - MEDICATION SUMMARY - MEDICATIONS TO STOP TAKING
I will STOP taking the medications listed below when I get home from the hospital:    atenolol 100 mg oral tablet  -- 1 tab(s) by mouth once a day

## 2017-06-20 NOTE — PROGRESS NOTE ADULT - ASSESSMENT
77 y/o female from home, walks with a cane at baseline, with PMHx of DM, HTN, (A Fib on coumadin) CVA (chronic occipital infarct) vertigo and ?CHF(on lasix and lisinopril at home) and PSx of removal of a cyst in the back 6 years ago presented with generalized body aches and fever since 3 days.

## 2017-06-20 NOTE — DISCHARGE NOTE ADULT - SECONDARY DIAGNOSIS.
Heart failure, acute, systolic and diastolic SHELBY (acute kidney injury) Pneumonia Anemia Diabetes Hypertension

## 2017-06-20 NOTE — DISCHARGE NOTE ADULT - MEDICATION SUMMARY - MEDICATIONS TO CHANGE
I will SWITCH the dose or number of times a day I take the medications listed below when I get home from the hospital:  None I will SWITCH the dose or number of times a day I take the medications listed below when I get home from the hospital:    warfarin 5 mg oral tablet  -- 1 tab(s) by mouth once a day

## 2017-06-20 NOTE — DISCHARGE NOTE ADULT - PATIENT PORTAL LINK FT
“You can access the [employer] Patient Portal, offered by [facility], by registering with the following website: [email or web address]”

## 2017-06-21 DIAGNOSIS — J40 BRONCHITIS, NOT SPECIFIED AS ACUTE OR CHRONIC: ICD-10-CM

## 2017-06-21 LAB
ANION GAP SERPL CALC-SCNC: 12 MMOL/L — SIGNIFICANT CHANGE UP (ref 5–17)
BUN SERPL-MCNC: 15 MG/DL — SIGNIFICANT CHANGE UP (ref 7–18)
CALCIUM SERPL-MCNC: 8.7 MG/DL — SIGNIFICANT CHANGE UP (ref 8.4–10.5)
CHLORIDE SERPL-SCNC: 103 MMOL/L — SIGNIFICANT CHANGE UP (ref 96–108)
CO2 SERPL-SCNC: 26 MMOL/L — SIGNIFICANT CHANGE UP (ref 22–31)
CREAT SERPL-MCNC: 1.39 MG/DL — HIGH (ref 0.5–1.3)
GLUCOSE SERPL-MCNC: 111 MG/DL — HIGH (ref 70–99)
HCT VFR BLD CALC: 32.2 % — LOW (ref 34.5–45)
HGB BLD-MCNC: 9.8 G/DL — LOW (ref 11.5–15.5)
INR BLD: 1.55 RATIO — HIGH (ref 0.88–1.16)
MAGNESIUM SERPL-MCNC: 2.2 MG/DL — SIGNIFICANT CHANGE UP (ref 1.6–2.6)
MCHC RBC-ENTMCNC: 21.9 PG — LOW (ref 27–34)
MCHC RBC-ENTMCNC: 30.5 GM/DL — LOW (ref 32–36)
MCV RBC AUTO: 71.7 FL — LOW (ref 80–100)
PHOSPHATE SERPL-MCNC: 3 MG/DL — SIGNIFICANT CHANGE UP (ref 2.5–4.5)
PLATELET # BLD AUTO: 250 K/UL — SIGNIFICANT CHANGE UP (ref 150–400)
POTASSIUM SERPL-MCNC: 3.5 MMOL/L — SIGNIFICANT CHANGE UP (ref 3.5–5.3)
POTASSIUM SERPL-SCNC: 3.5 MMOL/L — SIGNIFICANT CHANGE UP (ref 3.5–5.3)
PROTHROM AB SERPL-ACNC: 17.1 SEC — HIGH (ref 9.8–12.7)
RBC # BLD: 4.5 M/UL — SIGNIFICANT CHANGE UP (ref 3.8–5.2)
RBC # FLD: 15.3 % — HIGH (ref 10.3–14.5)
SODIUM SERPL-SCNC: 141 MMOL/L — SIGNIFICANT CHANGE UP (ref 135–145)
WBC # BLD: 8.3 K/UL — SIGNIFICANT CHANGE UP (ref 3.8–10.5)
WBC # FLD AUTO: 8.3 K/UL — SIGNIFICANT CHANGE UP (ref 3.8–10.5)

## 2017-06-21 PROCEDURE — 99233 SBSQ HOSP IP/OBS HIGH 50: CPT | Mod: GC

## 2017-06-21 RX ORDER — WARFARIN SODIUM 2.5 MG/1
7.5 TABLET ORAL ONCE
Qty: 0 | Refills: 0 | Status: COMPLETED | OUTPATIENT
Start: 2017-06-21 | End: 2017-06-21

## 2017-06-21 RX ORDER — AZITHROMYCIN 500 MG/1
250 TABLET, FILM COATED ORAL DAILY
Qty: 0 | Refills: 0 | Status: COMPLETED | OUTPATIENT
Start: 2017-06-21 | End: 2017-06-22

## 2017-06-21 RX ORDER — FUROSEMIDE 40 MG
40 TABLET ORAL DAILY
Qty: 0 | Refills: 0 | Status: DISCONTINUED | OUTPATIENT
Start: 2017-06-21 | End: 2017-06-24

## 2017-06-21 RX ORDER — LANOLIN ALCOHOL/MO/W.PET/CERES
3 CREAM (GRAM) TOPICAL ONCE
Qty: 0 | Refills: 0 | Status: COMPLETED | OUTPATIENT
Start: 2017-06-21 | End: 2017-06-21

## 2017-06-21 RX ADMIN — Medication 1: at 07:51

## 2017-06-21 RX ADMIN — HEPARIN SODIUM 5000 UNIT(S): 5000 INJECTION INTRAVENOUS; SUBCUTANEOUS at 06:18

## 2017-06-21 RX ADMIN — WARFARIN SODIUM 7.5 MILLIGRAM(S): 2.5 TABLET ORAL at 21:27

## 2017-06-21 RX ADMIN — HEPARIN SODIUM 5000 UNIT(S): 5000 INJECTION INTRAVENOUS; SUBCUTANEOUS at 17:41

## 2017-06-21 RX ADMIN — Medication 25 MILLIGRAM(S): at 06:18

## 2017-06-21 RX ADMIN — Medication 25 MILLIGRAM(S): at 21:27

## 2017-06-21 RX ADMIN — Medication 3 MILLIGRAM(S): at 00:58

## 2017-06-21 RX ADMIN — Medication 30 MILLILITER(S): at 15:24

## 2017-06-21 RX ADMIN — Medication 81 MILLIGRAM(S): at 13:00

## 2017-06-21 RX ADMIN — Medication 40 MILLIGRAM(S): at 06:18

## 2017-06-21 RX ADMIN — AZITHROMYCIN 250 MILLIGRAM(S): 500 TABLET, FILM COATED ORAL at 13:00

## 2017-06-21 RX ADMIN — Medication 25 MILLIGRAM(S): at 17:41

## 2017-06-21 RX ADMIN — Medication 325 MILLIGRAM(S): at 13:00

## 2017-06-21 RX ADMIN — Medication 0.12 MILLIGRAM(S): at 06:18

## 2017-06-21 RX ADMIN — LISINOPRIL 5 MILLIGRAM(S): 2.5 TABLET ORAL at 06:18

## 2017-06-21 RX ADMIN — ATORVASTATIN CALCIUM 40 MILLIGRAM(S): 80 TABLET, FILM COATED ORAL at 21:27

## 2017-06-21 RX ADMIN — Medication 40 MILLIGRAM(S): at 17:40

## 2017-06-21 NOTE — PROGRESS NOTE ADULT - PROBLEM SELECTOR PLAN 5
-Got 7.5 mg of coumadin yesterday , INR still subtherapeutic   - will give dose of coumadin 7.5 tonight -Got 7.5 mg of coumadin yesterday , INR still subtherapeutic 1.55  - will give dose of coumadin 7.5 tonight  - F/up the iNR

## 2017-06-21 NOTE — PROGRESS NOTE ADULT - PROBLEM SELECTOR PLAN 1
- Heart rate better controlled on telemetry   - Continue with digoxin 0,125 mg daily, lopressor 25 mg BID,  aspirin and Lipitor.

## 2017-06-21 NOTE — PROGRESS NOTE ADULT - PROBLEM SELECTOR PLAN 10
- No fever, no WBC count  - Zithromax changed to  mg x 2 days ( got 2 days of IV antbiotic) - No fever, no WBC count  - Zithromax changed to  mg x 2 days ( got 2 days of IV antibiotic)

## 2017-06-21 NOTE — PROGRESS NOTE ADULT - ASSESSMENT
75 y/o female from home, walks with a cane at baseline, with PMHx of DM, HTN, (A Fib on coumadin) CVA (chronic occipital infarct) vertigo and ?CHF(on lasix and lisinopril at home) and PSx of removal of a cyst in the back 6 years ago presented with generalized body aches and fever since 3 days.

## 2017-06-21 NOTE — PROGRESS NOTE ADULT - PROBLEM SELECTOR PLAN 8
- Microcytic anemia , Hb stable around 9-10 , iron deficiency ( iron saturation low , % saturation low )    - will give FeSo4   - Occult blood negative   - Outpatient GI follow up recommended

## 2017-06-21 NOTE — PROGRESS NOTE ADULT - ATTENDING COMMENTS
Patient was seen and examined by myself with team. Case was discussed with house staff in details.  Plan discussed with patient in details at bedside and all her questions / concerns were addressed.

## 2017-06-21 NOTE — PROGRESS NOTE ADULT - PROBLEM SELECTOR PLAN 2
- Patient clinically better  -Lasix changed to PO 40 mg daily   - Will get the  as patient has no insurance, possible  reason for non-compliance with meds.

## 2017-06-21 NOTE — PROGRESS NOTE ADULT - SUBJECTIVE AND OBJECTIVE BOX
Patient is a 76y old  Female who presents with a chief complaint of cough, weakness (20 Jun 2017 06:23)      INTERVAL HPI/OVERNIGHT EVENTS: no new complaints,  said she had one episode of black BM this morning, denies any abdominal pain.     REVIEW OF SYSTEMS:    CONSTITUTIONAL: No fever,   EYES: no acute visual disturbances  NECK: No pain or stiffness  RESPIRATORY: No cough; No shortness of breath  CARDIOVASCULAR: No chest pain, no palpitations  GASTROINTESTINAL: No pain. No nausea or vomiting; on episode of loose stool   NEUROLOGICAL: No headache or numbness, no tremors  HEME/LYMPH: No  bleeding gums    Vital Signs Last 24 Hrs  T(C): 37, Max: 37 (06-21 @ 07:24)  T(F): 98.6, Max: 98.6 (06-21 @ 07:24)  HR: 92 (84 - 95)  BP: 146/78 (146/78 - 167/104)  BP(mean): --  RR: 18 (16 - 18)  SpO2: 100% (100% - 100%)    ________________________________________________  PHYSICAL EXAM:  GENERAL: NAD,   HEAD:  , Normocephalic  EYES:  conjunctiva and sclera clear  NECK: Supple, No JVD    NERVOUS SYSTEM:  Alert & Oriented X3,   CHEST/LUNG: Clear to auscultation bilaterally;   HEART: S1 S2+;   ABDOMEN: Soft, Nontender, Nondistended; Bowel sounds present  EXTREMITIES: no cyanosis; 1 + pitting edema ( improving fro  before)  no calf tenderness    _________________________________________________  LABS:                        9.8    8.3   )-----------( 250      ( 21 Jun 2017 06:29 )             32.2     06-21    141  |  103  |  15  ----------------------------<  111<H>  3.5   |  26  |  1.39<H>    Ca    8.7      21 Jun 2017 06:29  Phos  3.0     06-21  Mg     2.2     06-21    TPro  7.1  /  Alb  3.3<L>  /  TBili  0.8  /  DBili  x   /  AST  15  /  ALT  15  /  AlkPhos  59  06-20        CAPILLARY BLOOD GLUCOSE  173 (21 Jun 2017 07:24)  152 (20 Jun 2017 21:32)  115 (20 Jun 2017 16:44)  157 (20 Jun 2017 11:19) Patient is a 76y old  Female who presents with a chief complaint of cough, weakness (20 Jun 2017 06:23)      INTERVAL HPI/OVERNIGHT EVENTS: no new complaints,  said she had one episode of black BM this morning, denies any abdominal pain.     REVIEW OF SYSTEMS:    CONSTITUTIONAL: No fever,   EYES: no acute visual disturbances  NECK: No pain or stiffness  RESPIRATORY: No cough; No shortness of breath  CARDIOVASCULAR: No chest pain, no palpitations  GASTROINTESTINAL: No pain. No nausea or vomiting; one episode of loose stool reported  NEUROLOGICAL: No headache or numbness, no tremors      Vital Signs Last 24 Hrs  T(C): 37, Max: 37 (06-21 @ 07:24)  T(F): 98.6, Max: 98.6 (06-21 @ 07:24)  HR: 92 (84 - 95)  BP: 146/78 (146/78 - 167/104)  BP(mean): --  RR: 18 (16 - 18)  SpO2: 100% (100% - 100%)    ________________________________________________  PHYSICAL EXAM:  GENERAL: NAD,   HEAD:  , Normocephalic  EYES:  conjunctiva and sclera clear  NECK: Supple, No JVD    NERVOUS SYSTEM:  Alert & Oriented X3,   CHEST/LUNG: Clear to auscultation bilaterally;   HEART: S1 S2+;   ABDOMEN: Soft, Nontender, Nondistended; Bowel sounds present  EXTREMITIES: no cyanosis; 1 + pitting edema ( improving fro  before)  no calf tenderness    _________________________________________________  LABS:                        9.8    8.3   )-----------( 250      ( 21 Jun 2017 06:29 )             32.2     06-21    141  |  103  |  15  ----------------------------<  111<H>  3.5   |  26  |  1.39<H>    Ca    8.7      21 Jun 2017 06:29  Phos  3.0     06-21  Mg     2.2     06-21    TPro  7.1  /  Alb  3.3<L>  /  TBili  0.8  /  DBili  x   /  AST  15  /  ALT  15  /  AlkPhos  59  06-20        CAPILLARY BLOOD GLUCOSE  173 (21 Jun 2017 07:24)  152 (20 Jun 2017 21:32)  115 (20 Jun 2017 16:44)  157 (20 Jun 2017 11:19)

## 2017-06-22 LAB
ANION GAP SERPL CALC-SCNC: 6 MMOL/L — SIGNIFICANT CHANGE UP (ref 5–17)
APPEARANCE UR: CLEAR — SIGNIFICANT CHANGE UP
BILIRUB UR-MCNC: NEGATIVE — SIGNIFICANT CHANGE UP
BUN SERPL-MCNC: 18 MG/DL — SIGNIFICANT CHANGE UP (ref 7–18)
CALCIUM SERPL-MCNC: 8.8 MG/DL — SIGNIFICANT CHANGE UP (ref 8.4–10.5)
CHLORIDE SERPL-SCNC: 104 MMOL/L — SIGNIFICANT CHANGE UP (ref 96–108)
CO2 SERPL-SCNC: 31 MMOL/L — SIGNIFICANT CHANGE UP (ref 22–31)
COLOR SPEC: YELLOW — SIGNIFICANT CHANGE UP
CREAT SERPL-MCNC: 1.41 MG/DL — HIGH (ref 0.5–1.3)
DIFF PNL FLD: ABNORMAL
GLUCOSE SERPL-MCNC: 121 MG/DL — HIGH (ref 70–99)
GLUCOSE UR QL: NEGATIVE — SIGNIFICANT CHANGE UP
HCT VFR BLD CALC: 34.3 % — LOW (ref 34.5–45)
HCT VFR BLD CALC: 37.8 % — SIGNIFICANT CHANGE UP (ref 34.5–45)
HGB BLD-MCNC: 10.3 G/DL — LOW (ref 11.5–15.5)
HGB BLD-MCNC: 10.9 G/DL — LOW (ref 11.5–15.5)
INR BLD: 1.92 RATIO — HIGH (ref 0.88–1.16)
KETONES UR-MCNC: NEGATIVE — SIGNIFICANT CHANGE UP
LEUKOCYTE ESTERASE UR-ACNC: ABNORMAL
MCHC RBC-ENTMCNC: 21.5 PG — LOW (ref 27–34)
MCHC RBC-ENTMCNC: 21.8 PG — LOW (ref 27–34)
MCHC RBC-ENTMCNC: 28.9 GM/DL — LOW (ref 32–36)
MCHC RBC-ENTMCNC: 30.1 GM/DL — LOW (ref 32–36)
MCV RBC AUTO: 72.6 FL — LOW (ref 80–100)
MCV RBC AUTO: 74.5 FL — LOW (ref 80–100)
NITRITE UR-MCNC: NEGATIVE — SIGNIFICANT CHANGE UP
PH UR: 6.5 — SIGNIFICANT CHANGE UP (ref 5–8)
PLATELET # BLD AUTO: 247 K/UL — SIGNIFICANT CHANGE UP (ref 150–400)
PLATELET # BLD AUTO: 293 K/UL — SIGNIFICANT CHANGE UP (ref 150–400)
POTASSIUM SERPL-MCNC: 3.2 MMOL/L — LOW (ref 3.5–5.3)
POTASSIUM SERPL-SCNC: 3.2 MMOL/L — LOW (ref 3.5–5.3)
PROT UR-MCNC: 30 MG/DL
PROTHROM AB SERPL-ACNC: 21.2 SEC — HIGH (ref 9.8–12.7)
RBC # BLD: 4.72 M/UL — SIGNIFICANT CHANGE UP (ref 3.8–5.2)
RBC # BLD: 5.08 M/UL — SIGNIFICANT CHANGE UP (ref 3.8–5.2)
RBC # FLD: 15.5 % — HIGH (ref 10.3–14.5)
RBC # FLD: 15.8 % — HIGH (ref 10.3–14.5)
SODIUM SERPL-SCNC: 141 MMOL/L — SIGNIFICANT CHANGE UP (ref 135–145)
SP GR SPEC: 1.01 — SIGNIFICANT CHANGE UP (ref 1.01–1.02)
UROBILINOGEN FLD QL: NEGATIVE — SIGNIFICANT CHANGE UP
WBC # BLD: 7.1 K/UL — SIGNIFICANT CHANGE UP (ref 3.8–10.5)
WBC # BLD: 8.4 K/UL — SIGNIFICANT CHANGE UP (ref 3.8–10.5)
WBC # FLD AUTO: 7.1 K/UL — SIGNIFICANT CHANGE UP (ref 3.8–10.5)
WBC # FLD AUTO: 8.4 K/UL — SIGNIFICANT CHANGE UP (ref 3.8–10.5)

## 2017-06-22 PROCEDURE — 99233 SBSQ HOSP IP/OBS HIGH 50: CPT | Mod: GC

## 2017-06-22 RX ORDER — POTASSIUM CHLORIDE 20 MEQ
40 PACKET (EA) ORAL ONCE
Qty: 0 | Refills: 0 | Status: COMPLETED | OUTPATIENT
Start: 2017-06-22 | End: 2017-06-22

## 2017-06-22 RX ORDER — ACETAMINOPHEN 500 MG
650 TABLET ORAL EVERY 6 HOURS
Qty: 0 | Refills: 0 | Status: DISCONTINUED | OUTPATIENT
Start: 2017-06-22 | End: 2017-06-24

## 2017-06-22 RX ORDER — ATORVASTATIN CALCIUM 80 MG/1
1 TABLET, FILM COATED ORAL
Qty: 30 | Refills: 0 | OUTPATIENT
Start: 2017-06-22 | End: 2017-07-22

## 2017-06-22 RX ORDER — FERROUS SULFATE 325(65) MG
1 TABLET ORAL
Qty: 30 | Refills: 0 | OUTPATIENT
Start: 2017-06-22 | End: 2017-07-22

## 2017-06-22 RX ORDER — ACETAMINOPHEN 500 MG
650 TABLET ORAL ONCE
Qty: 0 | Refills: 0 | Status: COMPLETED | OUTPATIENT
Start: 2017-06-22 | End: 2017-06-22

## 2017-06-22 RX ORDER — WARFARIN SODIUM 2.5 MG/1
5 TABLET ORAL ONCE
Qty: 0 | Refills: 0 | Status: DISCONTINUED | OUTPATIENT
Start: 2017-06-22 | End: 2017-06-22

## 2017-06-22 RX ADMIN — Medication 650 MILLIGRAM(S): at 06:47

## 2017-06-22 RX ADMIN — Medication 1: at 09:13

## 2017-06-22 RX ADMIN — ATORVASTATIN CALCIUM 40 MILLIGRAM(S): 80 TABLET, FILM COATED ORAL at 21:09

## 2017-06-22 RX ADMIN — Medication 25 MILLIGRAM(S): at 17:14

## 2017-06-22 RX ADMIN — HEPARIN SODIUM 5000 UNIT(S): 5000 INJECTION INTRAVENOUS; SUBCUTANEOUS at 05:57

## 2017-06-22 RX ADMIN — Medication 0.12 MILLIGRAM(S): at 05:57

## 2017-06-22 RX ADMIN — Medication 650 MILLIGRAM(S): at 07:18

## 2017-06-22 RX ADMIN — Medication 650 MILLIGRAM(S): at 12:15

## 2017-06-22 RX ADMIN — Medication 25 MILLIGRAM(S): at 21:09

## 2017-06-22 RX ADMIN — Medication 25 MILLIGRAM(S): at 05:57

## 2017-06-22 RX ADMIN — Medication 40 MILLIEQUIVALENT(S): at 09:14

## 2017-06-22 RX ADMIN — LISINOPRIL 5 MILLIGRAM(S): 2.5 TABLET ORAL at 05:57

## 2017-06-22 RX ADMIN — Medication 650 MILLIGRAM(S): at 12:59

## 2017-06-22 RX ADMIN — Medication 30 MILLILITER(S): at 17:13

## 2017-06-22 RX ADMIN — HEPARIN SODIUM 5000 UNIT(S): 5000 INJECTION INTRAVENOUS; SUBCUTANEOUS at 17:14

## 2017-06-22 RX ADMIN — Medication 40 MILLIGRAM(S): at 05:57

## 2017-06-22 RX ADMIN — Medication 325 MILLIGRAM(S): at 12:03

## 2017-06-22 RX ADMIN — AZITHROMYCIN 250 MILLIGRAM(S): 500 TABLET, FILM COATED ORAL at 12:03

## 2017-06-22 RX ADMIN — Medication 81 MILLIGRAM(S): at 12:03

## 2017-06-22 NOTE — PROGRESS NOTE ADULT - SUBJECTIVE AND OBJECTIVE BOX
Patient is a 76y old  Female who presents with a chief complaint of cough, weakness (20 Jun 2017 06:23)      INTERVAL HPI/OVERNIGHT EVENTS: Complains of headache , unable to sleep due to that . Had 1 BM this morning.       REVIEW OF SYSTEMS:    CONSTITUTIONAL: No fever, complain of headache   EYES: no acute visual disturbances  NECK: No pain or stiffness  RESPIRATORY: No cough; No shortness of breath  CARDIOVASCULAR: No chest pain, no palpitations  GASTROINTESTINAL: No pain. No nausea or vomiting; No diarrhea   NEUROLOGICAL: No headache or numbness, no tremors  HEME/LYMPH: No  bleeding gums    Vital Signs Last 24 Hrs  T(C): 36.6, Max: 36.7 (06-21 @ 15:32)  T(F): 97.9, Max: 98 (06-21 @ 15:32)  HR: 84 (67 - 84)  BP: 159/88 (105/92 - 159/88)  BP(mean): --  RR: 16 (16 - 18)  SpO2: 100% (97% - 100%)    ________________________________________________  PHYSICAL EXAM:  GENERAL: NAD,   HEAD:  , Normocephalic  EYES:  conjunctiva and sclera clear  NECK: Supple, No JVD    NERVOUS SYSTEM:  Alert & Oriented X3,   CHEST/LUNG: Clear to auscultation bilaterally;   HEART: S1 S2+;   ABDOMEN: Soft, Nontender, Nondistended; Bowel sounds present  EXTREMITIES: no cyanosis; 1 + pitting  edema; no calf tenderness    _________________________________________________  LABS:                        10.3   7.1   )-----------( 247      ( 22 Jun 2017 05:55 )             34.3     06-22    141  |  104  |  18  ----------------------------<  121<H>  3.2<L>   |  31  |  1.41<H>    Ca    8.8      22 Jun 2017 05:55  Phos  3.0     06-21  Mg     2.2     06-21      PT/INR - ( 22 Jun 2017 05:55 )   PT: 21.2 sec;   INR: 1.92 ratio             CAPILLARY BLOOD GLUCOSE  176 (22 Jun 2017 07:13)  247 (21 Jun 2017 20:21)  131 (21 Jun 2017 16:14)

## 2017-06-22 NOTE — PHYSICAL THERAPY INITIAL EVALUATION ADULT - LIVES WITH, PROFILE
spouse/private home with upstairs bedroom spouse/children/private home with upstairs bedroom and stoop

## 2017-06-22 NOTE — PROGRESS NOTE ADULT - ASSESSMENT
75 y/o female from home, walks with a cane at baseline, with PMHx of DM, HTN, (A Fib on coumadin) CVA (chronic occipital infarct) vertigo and ?CHF(on lasix and lisinopril at home) and PSx of removal of a cyst in the back 6 years ago presented with generalized body aches and fever since 3 days. 77 y/o female from home, walks with a cane at baseline, with PMHx of DM, HTN, (A Fib on coumadin) CVA (chronic occipital infarct) vertigo and CHF(on lasix and lisinopril at home) and PSx of removal of a cyst in the back 6 years ago presented with generalized body aches and fever since 3 days.

## 2017-06-22 NOTE — PROGRESS NOTE ADULT - ATTENDING COMMENTS
Patient was seen and examined by myself with team. Case was discussed with house staff in details.    77 y/o F with   1. chronic AFIB with rvr on admission now improved- continue with BB; on coumadin with  INR  2. Developed bloody BM x1 today and slight blood in urine- UA suggestive of UTI; in view of persistent crampy abdominal pain with diarrhea and episode of bloody BM although hemoglobin is stable- plan to obtain CT abdomen and pelvis; hold coumadin tonight. Check INR n am. If develops further bleeding episodes , repeat cbc and coagulation profile.  3. Acute on CKD 3- renal functions stable  4. Hypokalemia- replete potassium    Other plan as detailed and outlined above.  Plan discussed with patient and family in details at bedside and all their questions / concerns were addressed.

## 2017-06-22 NOTE — PROGRESS NOTE ADULT - PROBLEM SELECTOR PLAN 8
- Microcytic anemia , Hb stable around 9-10 , iron deficiency ( iron saturation low , % saturation low )    - will give FeSo4   - Outpatient GI follow up recommended

## 2017-06-22 NOTE — PROGRESS NOTE ADULT - PROBLEM SELECTOR PLAN 2
- Continue with lasix 40 daily   -  f/up  as patient has insurance but she has to pay extra for getting the medicines, the reason for non-compliance - Continue with Lasix 40 daily   -  f/up  as patient has insurance but she has to pay extra for getting the medicines, the reason for non-compliance

## 2017-06-22 NOTE — PROGRESS NOTE ADULT - PROBLEM SELECTOR PLAN 7
- hold metformin and glipizide  - continue HSS  - Fingertsicks well controlled - hold metformin and glipizide  - continue HSS  - Fingersticks well controlled - holding metformin and glipizide  - continue HSS  - Fingersticks well controlled

## 2017-06-22 NOTE — PHYSICAL THERAPY INITIAL EVALUATION ADULT - GENERAL OBSERVATIONS, REHAB EVAL
pt aox3 pleasant cooperative, slight difficulty in independent cane transfers, I personal cane ambulator

## 2017-06-22 NOTE — PROGRESS NOTE ADULT - PROBLEM SELECTOR PLAN 4
-Creatinine trending down 1.41  - hold nephrotoxic drugs  - Monitor the creatinine -Creatinine stable;  1.41 today  - avoid nephrotoxic drugs  - Monitor the creatinine

## 2017-06-22 NOTE — PROGRESS NOTE ADULT - PROBLEM SELECTOR PLAN 10
- No fever, no WBC count  - C/w zithromax x 1 day - No fever, no WBC count  - s/p Zithromax with improvement

## 2017-06-22 NOTE — PROGRESS NOTE ADULT - PROBLEM SELECTOR PLAN 5
-Got 7.5 mg of coumadin yesterday , INR 1.9  - will give dose of coumadin 7.5 tonight  - F/up the iNR -Got 7.5 mg of coumadin yesterday , INR 1.9  - Patient complains of blood inn urine, noted to have blood on tissue , will repeat the CBC , urinalaysis. Hold the coumadin tonight .

## 2017-06-22 NOTE — PROGRESS NOTE ADULT - PROBLEM SELECTOR PLAN 1
- Stable   - Continue with digoxin 0.125 mg daily, lopressor 25 mg BID,  aspirin and Lipitor.  - telemetry discontinue - Stable and rate controlled  - Continue with digoxin 0.125 mg daily, lopressor 25 mg BID,  aspirin and Lipitor.  - telemetry discontinue

## 2017-06-23 LAB
ANION GAP SERPL CALC-SCNC: 10 MMOL/L — SIGNIFICANT CHANGE UP (ref 5–17)
BUN SERPL-MCNC: 14 MG/DL — SIGNIFICANT CHANGE UP (ref 7–18)
CALCIUM SERPL-MCNC: 9.3 MG/DL — SIGNIFICANT CHANGE UP (ref 8.4–10.5)
CHLORIDE SERPL-SCNC: 104 MMOL/L — SIGNIFICANT CHANGE UP (ref 96–108)
CO2 SERPL-SCNC: 26 MMOL/L — SIGNIFICANT CHANGE UP (ref 22–31)
CREAT SERPL-MCNC: 1.39 MG/DL — HIGH (ref 0.5–1.3)
GLUCOSE SERPL-MCNC: 134 MG/DL — HIGH (ref 70–99)
HCT VFR BLD CALC: 40.2 % — SIGNIFICANT CHANGE UP (ref 34.5–45)
HGB BLD-MCNC: 11.5 G/DL — SIGNIFICANT CHANGE UP (ref 11.5–15.5)
INR BLD: 1.79 RATIO — HIGH (ref 0.88–1.16)
MCHC RBC-ENTMCNC: 21.4 PG — LOW (ref 27–34)
MCHC RBC-ENTMCNC: 28.7 GM/DL — LOW (ref 32–36)
MCV RBC AUTO: 74.6 FL — LOW (ref 80–100)
PLATELET # BLD AUTO: 294 K/UL — SIGNIFICANT CHANGE UP (ref 150–400)
POTASSIUM SERPL-MCNC: 3.9 MMOL/L — SIGNIFICANT CHANGE UP (ref 3.5–5.3)
POTASSIUM SERPL-SCNC: 3.9 MMOL/L — SIGNIFICANT CHANGE UP (ref 3.5–5.3)
PROTHROM AB SERPL-ACNC: 19.8 SEC — HIGH (ref 9.8–12.7)
RBC # BLD: 5.39 M/UL — HIGH (ref 3.8–5.2)
RBC # FLD: 15.6 % — HIGH (ref 10.3–14.5)
SODIUM SERPL-SCNC: 140 MMOL/L — SIGNIFICANT CHANGE UP (ref 135–145)
WBC # BLD: 8.2 K/UL — SIGNIFICANT CHANGE UP (ref 3.8–10.5)
WBC # FLD AUTO: 8.2 K/UL — SIGNIFICANT CHANGE UP (ref 3.8–10.5)

## 2017-06-23 PROCEDURE — 74176 CT ABD & PELVIS W/O CONTRAST: CPT | Mod: 26

## 2017-06-23 PROCEDURE — 99233 SBSQ HOSP IP/OBS HIGH 50: CPT | Mod: GC

## 2017-06-23 RX ORDER — WARFARIN SODIUM 2.5 MG/1
5 TABLET ORAL ONCE
Qty: 0 | Refills: 0 | Status: COMPLETED | OUTPATIENT
Start: 2017-06-23 | End: 2017-06-23

## 2017-06-23 RX ORDER — WARFARIN SODIUM 2.5 MG/1
1 TABLET ORAL
Qty: 0 | Refills: 0 | COMMUNITY

## 2017-06-23 RX ORDER — WARFARIN SODIUM 2.5 MG/1
1 TABLET ORAL
Qty: 30 | Refills: 0 | OUTPATIENT
Start: 2017-06-23 | End: 2017-07-23

## 2017-06-23 RX ORDER — METOPROLOL TARTRATE 50 MG
1 TABLET ORAL
Qty: 60 | Refills: 0 | OUTPATIENT
Start: 2017-06-23 | End: 2017-07-23

## 2017-06-23 RX ORDER — FERROUS SULFATE 325(65) MG
1 TABLET ORAL
Qty: 30 | Refills: 0 | OUTPATIENT
Start: 2017-06-23 | End: 2017-07-23

## 2017-06-23 RX ORDER — ATORVASTATIN CALCIUM 80 MG/1
1 TABLET, FILM COATED ORAL
Qty: 30 | Refills: 0 | OUTPATIENT
Start: 2017-06-23 | End: 2017-07-23

## 2017-06-23 RX ORDER — POTASSIUM CHLORIDE 20 MEQ
40 PACKET (EA) ORAL EVERY 4 HOURS
Qty: 0 | Refills: 0 | Status: COMPLETED | OUTPATIENT
Start: 2017-06-23 | End: 2017-06-23

## 2017-06-23 RX ADMIN — Medication 325 MILLIGRAM(S): at 12:15

## 2017-06-23 RX ADMIN — Medication 40 MILLIGRAM(S): at 06:00

## 2017-06-23 RX ADMIN — ATORVASTATIN CALCIUM 40 MILLIGRAM(S): 80 TABLET, FILM COATED ORAL at 21:26

## 2017-06-23 RX ADMIN — Medication 40 MILLIEQUIVALENT(S): at 14:52

## 2017-06-23 RX ADMIN — LISINOPRIL 5 MILLIGRAM(S): 2.5 TABLET ORAL at 06:00

## 2017-06-23 RX ADMIN — WARFARIN SODIUM 5 MILLIGRAM(S): 2.5 TABLET ORAL at 21:26

## 2017-06-23 RX ADMIN — Medication 25 MILLIGRAM(S): at 18:07

## 2017-06-23 RX ADMIN — HEPARIN SODIUM 5000 UNIT(S): 5000 INJECTION INTRAVENOUS; SUBCUTANEOUS at 06:01

## 2017-06-23 RX ADMIN — Medication 0.12 MILLIGRAM(S): at 06:00

## 2017-06-23 RX ADMIN — Medication 40 MILLIEQUIVALENT(S): at 12:15

## 2017-06-23 RX ADMIN — Medication 25 MILLIGRAM(S): at 21:26

## 2017-06-23 RX ADMIN — Medication 25 MILLIGRAM(S): at 06:01

## 2017-06-23 RX ADMIN — Medication 81 MILLIGRAM(S): at 12:15

## 2017-06-23 RX ADMIN — Medication 30 MILLILITER(S): at 02:22

## 2017-06-23 RX ADMIN — Medication 25 MILLIGRAM(S): at 14:52

## 2017-06-23 NOTE — PROGRESS NOTE ADULT - PROBLEM SELECTOR PLAN 9
- IMPROVE VTE is 2  - sc heparin for DVT ppx - IMPROVE VTE is 2  - discontinue subcutaneous heparin as she is on coumadin

## 2017-06-23 NOTE — PROGRESS NOTE ADULT - ATTENDING COMMENTS
Patient was seen and examined by myself with team. Case was discussed with house staff in details.  This is a 77 y/o F with   1.  AFIB ( Chronic) with rvr on admission now improved with rate well controlled- continue with BB; on coumadin with  INR 1.79 today  2.  Abdominal pain with bloody BM x1 yesterday- no new complaints of blood in urine or stool. CT abd/ pelvis negative with no evidence of diverticular disease  3.  UA suggestive of UTI- will treat empirically   4.  Acute on CKD 3- renal functions stable; avoid nephrotoxins  DISPO- stable for discharge home today. Time spent to coordinate discharge- 35 minutes.  Plan discussed with patient and family in details at bedside and all their questions / concerns were addressed. Patient was seen and examined by myself with team. Case was discussed with house staff in details.  This is a 77 y/o F with   1.  AFIB ( Chronic) with rvr  and acute combined heart failure requiring IV Lasix on admission now improved with rate well controlled- continue with BB; on coumadin with  INR 1.79 today; continue diuretics.  2.  Abdominal pain with bloody BM x1 yesterday- no new complaints of blood in urine or stool. CT abd/ pelvis negative with no evidence of diverticular disease  3.  UA suggestive of UTI- but she is asymptomatic; she received a dose of Ceftriaxone on the 19th She is fever free and feels well. monitor off antibiotics  4.  Acute on CKD 3- renal functions stable; avoid nephrotoxins  DISPO- stable for discharge home today. Time spent to coordinate discharge- 35 minutes.  Plan discussed with patient and family in details at bedside and all their questions / concerns were addressed.

## 2017-06-23 NOTE — PROGRESS NOTE ADULT - PROBLEM SELECTOR PLAN 1
- Continue with digoxin 0.125 mg daily, lopressor 25 mg BID,  aspirin and Lipitor. -Stable;   Continue with digoxin 0.125 mg daily, lopressor 25 mg BID,  aspirin and Lipitor.

## 2017-06-23 NOTE — PROGRESS NOTE ADULT - PROBLEM SELECTOR PLAN 7
- holding metformin and glipizide  - continue HSS  - Fingersticks well controlled - holding metformin and glipizide- restart upon discharge  - continue HSS  - Fingersticks well controlled

## 2017-06-23 NOTE — PROGRESS NOTE ADULT - ASSESSMENT
77 y/o female from home, walks with a cane at baseline, with PMHx of DM, HTN, (A Fib on coumadin) CVA (chronic occipital infarct) vertigo and CHF(on lasix and lisinopril at home) and PSx of removal of a cyst in the back 6 years ago presented with generalized body aches and fever since 3 days.

## 2017-06-23 NOTE — PROGRESS NOTE ADULT - PROBLEM SELECTOR PLAN 5
- No more episodes of blood noted   - Will give 5 mg of coumadin today , f/up on INR in am   - Will dc on 5 mg of coumadin .  -Patient will check the INR at her PCP office next week - No more episodes of blood noted   - Will give 5 mg of coumadin today , f/up on INR in am   - Will dc on 5 mg of coumadin Monday through Friday and 2.5 mg on Saturdays and Sundays .  -Patient will check the INR at her PCP office next week

## 2017-06-23 NOTE — PROGRESS NOTE ADULT - SUBJECTIVE AND OBJECTIVE BOX
Patient is a 76y old  Female who presents with a chief complaint of cough, weakness (20 Jun 2017 06:23)      INTERVAL HPI/OVERNIGHT EVENTS: No further episodes of bleeding reported.     REVIEW OF SYSTEMS:    CONSTITUTIONAL: No fever,   EYES: no acute visual disturbances  NECK: No pain or stiffness  RESPIRATORY: No cough; No shortness of breath  CARDIOVASCULAR: No chest pain, no palpitations  GASTROINTESTINAL: No pain. No nausea or vomiting; No diarrhea   NEUROLOGICAL: No headache or numbness, no tremors  HEME/LYMPH: No  bleeding gums    Vital Signs Last 24 Hrs  T(C): 36.8, Max: 36.9 (06-22 @ 16:07)  T(F): 98.2, Max: 98.5 (06-23 @ 07:24)  HR: 69 (68 - 86)  BP: 151/97 (127/79 - 151/97)  BP(mean): --  RR: 18 (18 - 18)  SpO2: 99% (99% - 100%)    ________________________________________________  PHYSICAL EXAM:  GENERAL: NAD,   HEAD:  , Normocephalic  EYES:  conjunctiva and sclera clear  NECK: Supple, No JVD    NERVOUS SYSTEM:  Alert & Oriented X3,   CHEST/LUNG: Clear to auscultation bilaterally;   HEART: S1 S2+;   ABDOMEN: Soft, Nontender, Nondistended; Bowel sounds present  EXTREMITIES: no cyanosis; no edema; no calf tenderness    _________________________________________________  LABS:                        11.5   8.2   )-----------( 294      ( 23 Jun 2017 07:44 )             40.2     06-23    140  |  104  |  14  ----------------------------<  134<H>  3.9   |  26  |  1.39<H>    Ca    9.3      23 Jun 2017 07:44      PT/INR - ( 23 Jun 2017 07:44 )   PT: 19.8 sec;   INR: 1.79 ratio Patient is a 76y old  Female who presents with a chief complaint of cough, weakness (20 Jun 2017 06:23)      INTERVAL HPI/OVERNIGHT EVENTS: No further episodes of bleeding reported.     REVIEW OF SYSTEMS:    CONSTITUTIONAL: No fever,   EYES: no acute visual disturbances  NECK: No pain or stiffness  RESPIRATORY: No cough; No shortness of breath  CARDIOVASCULAR: No chest pain, no palpitations  GASTROINTESTINAL: No pain. No nausea or vomiting; No diarrhea   NEUROLOGICAL: No headache or numbness, no tremors  HEME/LYMPH: No  bleeding gums    Vital Signs Last 24 Hrs  T(C): 36.8, Max: 36.9 (06-22 @ 16:07)  T(F): 98.2, Max: 98.5 (06-23 @ 07:24)  HR: 69 (68 - 86)  BP: 151/97 (127/79 - 151/97)  BP(mean): --  RR: 18 (18 - 18)  SpO2: 99% (99% - 100%)    ________________________________________________  PHYSICAL EXAM:  GENERAL: NAD,   HEAD:  , Normocephalic  EYES:  conjunctiva and sclera clear  NECK: Supple, No JVD    NERVOUS SYSTEM:  Alert & Oriented X3,   CHEST/LUNG: Clear to auscultation bilaterally;   HEART: S1 S2+;   ABDOMEN: Soft, Nontender, Nondistended; Bowel sounds present  EXTREMITIES: no cyanosis; resolved LE edema; no calf tenderness    _________________________________________________  LABS:                        11.5   8.2   )-----------( 294      ( 23 Jun 2017 07:44 )             40.2     06-23    140  |  104  |  14  ----------------------------<  134<H>  3.9   |  26  |  1.39<H>    Ca    9.3      23 Jun 2017 07:44      PT/INR - ( 23 Jun 2017 07:44 )   PT: 19.8 sec;   INR: 1.79 ratio

## 2017-06-24 VITALS
DIASTOLIC BLOOD PRESSURE: 65 MMHG | HEART RATE: 62 BPM | OXYGEN SATURATION: 96 % | RESPIRATION RATE: 17 BRPM | SYSTOLIC BLOOD PRESSURE: 135 MMHG | TEMPERATURE: 98 F

## 2017-06-24 LAB
ANION GAP SERPL CALC-SCNC: 8 MMOL/L — SIGNIFICANT CHANGE UP (ref 5–17)
BUN SERPL-MCNC: 19 MG/DL — HIGH (ref 7–18)
CALCIUM SERPL-MCNC: 9 MG/DL — SIGNIFICANT CHANGE UP (ref 8.4–10.5)
CHLORIDE SERPL-SCNC: 107 MMOL/L — SIGNIFICANT CHANGE UP (ref 96–108)
CO2 SERPL-SCNC: 29 MMOL/L — SIGNIFICANT CHANGE UP (ref 22–31)
CREAT SERPL-MCNC: 1.45 MG/DL — HIGH (ref 0.5–1.3)
CULTURE RESULTS: SIGNIFICANT CHANGE UP
CULTURE RESULTS: SIGNIFICANT CHANGE UP
GLUCOSE SERPL-MCNC: 123 MG/DL — HIGH (ref 70–99)
INR BLD: 1.71 RATIO — HIGH (ref 0.88–1.16)
POTASSIUM SERPL-MCNC: 4.5 MMOL/L — SIGNIFICANT CHANGE UP (ref 3.5–5.3)
POTASSIUM SERPL-SCNC: 4.5 MMOL/L — SIGNIFICANT CHANGE UP (ref 3.5–5.3)
PROTHROM AB SERPL-ACNC: 18.9 SEC — HIGH (ref 9.8–12.7)
SODIUM SERPL-SCNC: 144 MMOL/L — SIGNIFICANT CHANGE UP (ref 135–145)
SPECIMEN SOURCE: SIGNIFICANT CHANGE UP
SPECIMEN SOURCE: SIGNIFICANT CHANGE UP

## 2017-06-24 PROCEDURE — 82550 ASSAY OF CK (CPK): CPT

## 2017-06-24 PROCEDURE — 71045 X-RAY EXAM CHEST 1 VIEW: CPT

## 2017-06-24 PROCEDURE — 83735 ASSAY OF MAGNESIUM: CPT

## 2017-06-24 PROCEDURE — 93005 ELECTROCARDIOGRAM TRACING: CPT

## 2017-06-24 PROCEDURE — 84484 ASSAY OF TROPONIN QUANT: CPT

## 2017-06-24 PROCEDURE — 36415 COLL VENOUS BLD VENIPUNCTURE: CPT

## 2017-06-24 PROCEDURE — 84100 ASSAY OF PHOSPHORUS: CPT

## 2017-06-24 PROCEDURE — 99285 EMERGENCY DEPT VISIT HI MDM: CPT | Mod: 25

## 2017-06-24 PROCEDURE — 81001 URINALYSIS AUTO W/SCOPE: CPT

## 2017-06-24 PROCEDURE — 82272 OCCULT BLD FECES 1-3 TESTS: CPT

## 2017-06-24 PROCEDURE — 83880 ASSAY OF NATRIURETIC PEPTIDE: CPT

## 2017-06-24 PROCEDURE — 87040 BLOOD CULTURE FOR BACTERIA: CPT

## 2017-06-24 PROCEDURE — 80162 ASSAY OF DIGOXIN TOTAL: CPT

## 2017-06-24 PROCEDURE — 86850 RBC ANTIBODY SCREEN: CPT

## 2017-06-24 PROCEDURE — 85730 THROMBOPLASTIN TIME PARTIAL: CPT

## 2017-06-24 PROCEDURE — 97161 PT EVAL LOW COMPLEX 20 MIN: CPT

## 2017-06-24 PROCEDURE — 80061 LIPID PANEL: CPT

## 2017-06-24 PROCEDURE — 85027 COMPLETE CBC AUTOMATED: CPT

## 2017-06-24 PROCEDURE — 87581 M.PNEUMON DNA AMP PROBE: CPT

## 2017-06-24 PROCEDURE — 83550 IRON BINDING TEST: CPT

## 2017-06-24 PROCEDURE — 96374 THER/PROPH/DIAG INJ IV PUSH: CPT

## 2017-06-24 PROCEDURE — 83036 HEMOGLOBIN GLYCOSYLATED A1C: CPT

## 2017-06-24 PROCEDURE — 85610 PROTHROMBIN TIME: CPT

## 2017-06-24 PROCEDURE — 93306 TTE W/DOPPLER COMPLETE: CPT

## 2017-06-24 PROCEDURE — 87486 CHLMYD PNEUM DNA AMP PROBE: CPT

## 2017-06-24 PROCEDURE — 84443 ASSAY THYROID STIM HORMONE: CPT

## 2017-06-24 PROCEDURE — 74176 CT ABD & PELVIS W/O CONTRAST: CPT

## 2017-06-24 PROCEDURE — 82728 ASSAY OF FERRITIN: CPT

## 2017-06-24 PROCEDURE — 82553 CREATINE MB FRACTION: CPT

## 2017-06-24 PROCEDURE — 87633 RESP VIRUS 12-25 TARGETS: CPT

## 2017-06-24 PROCEDURE — 80048 BASIC METABOLIC PNL TOTAL CA: CPT

## 2017-06-24 PROCEDURE — 80053 COMPREHEN METABOLIC PANEL: CPT

## 2017-06-24 PROCEDURE — 84466 ASSAY OF TRANSFERRIN: CPT

## 2017-06-24 PROCEDURE — 87798 DETECT AGENT NOS DNA AMP: CPT

## 2017-06-24 PROCEDURE — 86901 BLOOD TYPING SEROLOGIC RH(D): CPT

## 2017-06-24 PROCEDURE — 83605 ASSAY OF LACTIC ACID: CPT

## 2017-06-24 PROCEDURE — 87086 URINE CULTURE/COLONY COUNT: CPT

## 2017-06-24 RX ORDER — WARFARIN SODIUM 2.5 MG/1
1 TABLET ORAL
Qty: 9 | Refills: 0 | OUTPATIENT
Start: 2017-06-24 | End: 2017-07-24

## 2017-06-24 RX ORDER — WARFARIN SODIUM 2.5 MG/1
2.5 TABLET ORAL DAILY
Qty: 0 | Refills: 0 | Status: DISCONTINUED | OUTPATIENT
Start: 2017-06-24 | End: 2017-06-24

## 2017-06-24 RX ORDER — WARFARIN SODIUM 2.5 MG/1
7.5 TABLET ORAL ONCE
Qty: 0 | Refills: 0 | Status: DISCONTINUED | OUTPATIENT
Start: 2017-06-24 | End: 2017-06-24

## 2017-06-24 RX ORDER — WARFARIN SODIUM 2.5 MG/1
2 TABLET ORAL
Qty: 43 | Refills: 0 | OUTPATIENT
Start: 2017-06-24 | End: 2017-07-24

## 2017-06-24 RX ADMIN — LISINOPRIL 5 MILLIGRAM(S): 2.5 TABLET ORAL at 05:51

## 2017-06-24 RX ADMIN — Medication 0.12 MILLIGRAM(S): at 05:51

## 2017-06-24 RX ADMIN — Medication 81 MILLIGRAM(S): at 12:39

## 2017-06-24 RX ADMIN — Medication 25 MILLIGRAM(S): at 05:51

## 2017-06-24 RX ADMIN — Medication 325 MILLIGRAM(S): at 12:39

## 2017-06-24 RX ADMIN — Medication 40 MILLIGRAM(S): at 05:51

## 2017-06-24 NOTE — PROGRESS NOTE ADULT - ASSESSMENT
75 y/o female from home, walks with a cane at baseline, with PMHx of DM, HTN, (A Fib on coumadin) CVA (chronic occipital infarct) vertigo and CHF(on lasix and lisinopril at home) and PSx of removal of a cyst in the back 6 years ago presented with generalized body aches and fever since 3 days.

## 2017-06-24 NOTE — PROGRESS NOTE ADULT - ATTENDING COMMENTS
Patient seen and examined at bedside, feeling ok, agree with above,   denies any new episodes of Lower GI Bleed, H/H stable, vitals stable,  Her INR subtherapeutic and downtrending.   Physical exam: as above, +1 LE Edema  A/P:  A fib: will give 7.5 mg of coumadin tonight, f.u INR tomorrow.  GI Bleed: H/H stable, guaiac negative, patient advised to follow up outpatient GI for possible colonoscopy  however no emergency for inpatient procedure now.  CHF: continue with Lasix, improved  CKD stage 3: creatinine at baseline 2.4.  D/C planning once INR up to therapeutic levels.

## 2017-06-24 NOTE — PROGRESS NOTE ADULT - PROBLEM SELECTOR PLAN 5
-INR 1.71  - Will give 2.5 mg of coumadin today , f/up on INR in am   - Will dc on 5 mg of coumadin Monday through Friday and 2.5 mg on Saturdays and Sundays .  -Patient will check the INR at her PCP office next week

## 2017-06-24 NOTE — PROGRESS NOTE ADULT - SUBJECTIVE AND OBJECTIVE BOX
Patient is a 76y old  Female who presents with a chief complaint of cough, weakness (20 Jun 2017 06:23)      INTERVAL HPI/OVERNIGHT EVENTS: no new complaints    REVIEW OF SYSTEMS:    CONSTITUTIONAL: No fever,   EYES: no acute visual disturbances  NECK: No pain or stiffness  RESPIRATORY: No cough; No shortness of breath  CARDIOVASCULAR: No chest pain, no palpitations  GASTROINTESTINAL: No pain. No nausea or vomiting; No diarrhea   NEUROLOGICAL: No headache or numbness, no tremors  HEME/LYMPH: No  bleeding gums    Vital Signs Last 24 Hrs  T(C): 36.6, Max: 36.9 (06-23 @ 16:02)  T(F): 97.8, Max: 98.4 (06-23 @ 16:02)  HR: 84 (61 - 84)  BP: 134/77 (110/72 - 151/97)  BP(mean): --  RR: 18 (18 - 18)  SpO2: 100% (95% - 100%)    ________________________________________________  PHYSICAL EXAM:  GENERAL: NAD,   HEAD:  , Normocephalic  EYES:  conjunctiva and sclera clear  NECK: Supple, No JVD    NERVOUS SYSTEM:  Alert & Oriented X3,   CHEST/LUNG: Clear to auscuitation bilaterally;   HEART: S1 S2+;   ABDOMEN: Soft, Nontender, Nondistended; Bowel sounds present  EXTREMITIES: no cyanosis; no edema; no calf tenderness    _________________________________________________  LABS:                        10.6   6.6   )-----------( 255      ( 24 Jun 2017 06:09 )             36.0     06-24    144  |  107  |  19<H>  ----------------------------<  123<H>  4.5   |  29  |  1.45<H>    Ca    9.0      24 Jun 2017 06:09      PT/INR - ( 24 Jun 2017 06:09 )   PT: 18.9 sec;   INR: 1.71 ratio           CAPILLARY BLOOD GLUCOSE  94 (23 Jun 2017 20:53)  137 (23 Jun 2017 16:30)  136 (23 Jun 2017 11:25) Patient is a 76y old  Female who presents with a chief complaint of cough, weakness (20 Jun 2017 06:23)      INTERVAL HPI/OVERNIGHT EVENTS: no new complaints    REVIEW OF SYSTEMS:    CONSTITUTIONAL: No fever,   EYES: no acute visual disturbances  NECK: No pain or stiffness  RESPIRATORY: No cough; No shortness of breath  CARDIOVASCULAR: No chest pain, no palpitations  GASTROINTESTINAL: No pain. No nausea or vomiting; No diarrhea   NEUROLOGICAL: No headache or numbness, no tremors  HEME/LYMPH: No  bleeding gums    Vital Signs Last 24 Hrs  T(C): 36.6, Max: 36.9 (06-23 @ 16:02)  T(F): 97.8, Max: 98.4 (06-23 @ 16:02)  HR: 84 (61 - 84)  BP: 134/77 (110/72 - 151/97)  BP(mean): --  RR: 18 (18 - 18)  SpO2: 100% (95% - 100%)    ________________________________________________  PHYSICAL EXAM:  GENERAL: NAD,   HEAD:  , Normocephalic  EYES:  conjunctiva and sclera clear  NECK: Supple, No JVD    NERVOUS SYSTEM:  Alert & Oriented X3,   CHEST/LUNG: Clear to auscuitation bilaterally;   HEART: S1 S2+;   ABDOMEN: Soft, Nontender, Nondistended; Bowel sounds present  EXTREMITIES: no cyanosis; +1 edema; no calf tenderness    _________________________________________________  LABS:                        10.6   6.6   )-----------( 255      ( 24 Jun 2017 06:09 )             36.0     06-24    144  |  107  |  19<H>  ----------------------------<  123<H>  4.5   |  29  |  1.45<H>    Ca    9.0      24 Jun 2017 06:09      PT/INR - ( 24 Jun 2017 06:09 )   PT: 18.9 sec;   INR: 1.71 ratio           CAPILLARY BLOOD GLUCOSE  94 (23 Jun 2017 20:53)  137 (23 Jun 2017 16:30)  136 (23 Jun 2017 11:25)

## 2017-06-24 NOTE — PROGRESS NOTE ADULT - PROBLEM SELECTOR PLAN 7
- holding metformin and glipizide- restart upon discharge  - continue HSS  - Fingersticks well controlled

## 2017-06-29 DIAGNOSIS — E87.6 HYPOKALEMIA: ICD-10-CM

## 2017-06-29 DIAGNOSIS — I11.0 HYPERTENSIVE HEART DISEASE WITH HEART FAILURE: ICD-10-CM

## 2017-06-29 DIAGNOSIS — N17.9 ACUTE KIDNEY FAILURE, UNSPECIFIED: ICD-10-CM

## 2017-06-29 DIAGNOSIS — Z79.01 LONG TERM (CURRENT) USE OF ANTICOAGULANTS: ICD-10-CM

## 2017-06-29 DIAGNOSIS — I08.1 RHEUMATIC DISORDERS OF BOTH MITRAL AND TRICUSPID VALVES: ICD-10-CM

## 2017-06-29 DIAGNOSIS — I50.43 ACUTE ON CHRONIC COMBINED SYSTOLIC (CONGESTIVE) AND DIASTOLIC (CONGESTIVE) HEART FAILURE: ICD-10-CM

## 2017-06-29 DIAGNOSIS — Z86.73 PERSONAL HISTORY OF TRANSIENT ISCHEMIC ATTACK (TIA), AND CEREBRAL INFARCTION WITHOUT RESIDUAL DEFICITS: ICD-10-CM

## 2017-06-29 DIAGNOSIS — I13.0 HYPERTENSIVE HEART AND CHRONIC KIDNEY DISEASE WITH HEART FAILURE AND STAGE 1 THROUGH STAGE 4 CHRONIC KIDNEY DISEASE, OR UNSPECIFIED CHRONIC KIDNEY DISEASE: ICD-10-CM

## 2017-06-29 DIAGNOSIS — J18.9 PNEUMONIA, UNSPECIFIED ORGANISM: ICD-10-CM

## 2017-06-29 DIAGNOSIS — D50.9 IRON DEFICIENCY ANEMIA, UNSPECIFIED: ICD-10-CM

## 2017-06-29 DIAGNOSIS — E11.9 TYPE 2 DIABETES MELLITUS WITHOUT COMPLICATIONS: ICD-10-CM

## 2017-06-29 DIAGNOSIS — R79.1 ABNORMAL COAGULATION PROFILE: ICD-10-CM

## 2017-06-29 DIAGNOSIS — J20.9 ACUTE BRONCHITIS, UNSPECIFIED: ICD-10-CM

## 2017-06-29 DIAGNOSIS — I48.91 UNSPECIFIED ATRIAL FIBRILLATION: ICD-10-CM

## 2017-06-29 DIAGNOSIS — Z91.14 PATIENT'S OTHER NONCOMPLIANCE WITH MEDICATION REGIMEN: ICD-10-CM

## 2017-06-29 DIAGNOSIS — M19.90 UNSPECIFIED OSTEOARTHRITIS, UNSPECIFIED SITE: ICD-10-CM

## 2017-06-29 DIAGNOSIS — N18.3 CHRONIC KIDNEY DISEASE, STAGE 3 (MODERATE): ICD-10-CM

## 2017-06-29 DIAGNOSIS — E11.22 TYPE 2 DIABETES MELLITUS WITH DIABETIC CHRONIC KIDNEY DISEASE: ICD-10-CM

## 2017-06-29 DIAGNOSIS — I24.9 ACUTE ISCHEMIC HEART DISEASE, UNSPECIFIED: ICD-10-CM

## 2017-06-29 DIAGNOSIS — R42 DIZZINESS AND GIDDINESS: ICD-10-CM

## 2017-07-30 ENCOUNTER — INPATIENT (INPATIENT)
Facility: HOSPITAL | Age: 77
LOS: 3 days | Discharge: ANOTHER HOSPITAL | DRG: 305 | End: 2017-08-03
Attending: INTERNAL MEDICINE | Admitting: INTERNAL MEDICINE
Payer: COMMERCIAL

## 2017-07-30 VITALS
DIASTOLIC BLOOD PRESSURE: 102 MMHG | RESPIRATION RATE: 18 BRPM | HEIGHT: 66 IN | SYSTOLIC BLOOD PRESSURE: 140 MMHG | HEART RATE: 77 BPM | OXYGEN SATURATION: 96 % | WEIGHT: 199.96 LBS

## 2017-07-30 DIAGNOSIS — Z29.9 ENCOUNTER FOR PROPHYLACTIC MEASURES, UNSPECIFIED: ICD-10-CM

## 2017-07-30 DIAGNOSIS — N18.9 CHRONIC KIDNEY DISEASE, UNSPECIFIED: ICD-10-CM

## 2017-07-30 DIAGNOSIS — I50.9 HEART FAILURE, UNSPECIFIED: ICD-10-CM

## 2017-07-30 DIAGNOSIS — I10 ESSENTIAL (PRIMARY) HYPERTENSION: ICD-10-CM

## 2017-07-30 DIAGNOSIS — D64.9 ANEMIA, UNSPECIFIED: ICD-10-CM

## 2017-07-30 DIAGNOSIS — E11.9 TYPE 2 DIABETES MELLITUS WITHOUT COMPLICATIONS: ICD-10-CM

## 2017-07-30 DIAGNOSIS — I49.9 CARDIAC ARRHYTHMIA, UNSPECIFIED: ICD-10-CM

## 2017-07-30 DIAGNOSIS — E87.6 HYPOKALEMIA: ICD-10-CM

## 2017-07-30 LAB
ALBUMIN SERPL ELPH-MCNC: 3.4 G/DL — LOW (ref 3.5–5)
ALP SERPL-CCNC: 77 U/L — SIGNIFICANT CHANGE UP (ref 40–120)
ALT FLD-CCNC: 28 U/L DA — SIGNIFICANT CHANGE UP (ref 10–60)
ANION GAP SERPL CALC-SCNC: 12 MMOL/L — SIGNIFICANT CHANGE UP (ref 5–17)
AST SERPL-CCNC: 38 U/L — SIGNIFICANT CHANGE UP (ref 10–40)
BASOPHILS # BLD AUTO: 0.1 K/UL — SIGNIFICANT CHANGE UP (ref 0–0.2)
BASOPHILS NFR BLD AUTO: 1.1 % — SIGNIFICANT CHANGE UP (ref 0–2)
BILIRUB SERPL-MCNC: 1.6 MG/DL — HIGH (ref 0.2–1.2)
BUN SERPL-MCNC: 14 MG/DL — SIGNIFICANT CHANGE UP (ref 7–18)
CALCIUM SERPL-MCNC: 8.2 MG/DL — LOW (ref 8.4–10.5)
CHLORIDE SERPL-SCNC: 99 MMOL/L — SIGNIFICANT CHANGE UP (ref 96–108)
CK SERPL-CCNC: 121 U/L — SIGNIFICANT CHANGE UP (ref 21–215)
CO2 SERPL-SCNC: 31 MMOL/L — SIGNIFICANT CHANGE UP (ref 22–31)
CREAT SERPL-MCNC: 1.45 MG/DL — HIGH (ref 0.5–1.3)
EOSINOPHIL # BLD AUTO: 0 K/UL — SIGNIFICANT CHANGE UP (ref 0–0.5)
EOSINOPHIL NFR BLD AUTO: 0.5 % — SIGNIFICANT CHANGE UP (ref 0–6)
GLUCOSE SERPL-MCNC: 103 MG/DL — HIGH (ref 70–99)
HCT VFR BLD CALC: 33.8 % — LOW (ref 34.5–45)
HGB BLD-MCNC: 10.3 G/DL — LOW (ref 11.5–15.5)
INR BLD: 2.04 RATIO — HIGH (ref 0.88–1.16)
LYMPHOCYTES # BLD AUTO: 1.4 K/UL — SIGNIFICANT CHANGE UP (ref 1–3.3)
LYMPHOCYTES # BLD AUTO: 19 % — SIGNIFICANT CHANGE UP (ref 13–44)
MAGNESIUM SERPL-MCNC: 1.8 MG/DL — SIGNIFICANT CHANGE UP (ref 1.6–2.6)
MCHC RBC-ENTMCNC: 21.2 PG — LOW (ref 27–34)
MCHC RBC-ENTMCNC: 30.4 GM/DL — LOW (ref 32–36)
MCV RBC AUTO: 69.9 FL — LOW (ref 80–100)
MONOCYTES # BLD AUTO: 0.5 K/UL — SIGNIFICANT CHANGE UP (ref 0–0.9)
MONOCYTES NFR BLD AUTO: 6.4 % — SIGNIFICANT CHANGE UP (ref 2–14)
NEUTROPHILS # BLD AUTO: 5.3 K/UL — SIGNIFICANT CHANGE UP (ref 1.8–7.4)
NEUTROPHILS NFR BLD AUTO: 73 % — SIGNIFICANT CHANGE UP (ref 43–77)
NT-PROBNP SERPL-SCNC: 5124 PG/ML — HIGH (ref 0–450)
PLATELET # BLD AUTO: 261 K/UL — SIGNIFICANT CHANGE UP (ref 150–400)
POTASSIUM SERPL-MCNC: 3 MMOL/L — LOW (ref 3.5–5.3)
POTASSIUM SERPL-SCNC: 3 MMOL/L — LOW (ref 3.5–5.3)
PROT SERPL-MCNC: 6.8 G/DL — SIGNIFICANT CHANGE UP (ref 6–8.3)
PROTHROM AB SERPL-ACNC: 22.6 SEC — HIGH (ref 9.8–12.7)
RBC # BLD: 4.84 M/UL — SIGNIFICANT CHANGE UP (ref 3.8–5.2)
RBC # FLD: 16.3 % — HIGH (ref 10.3–14.5)
SODIUM SERPL-SCNC: 142 MMOL/L — SIGNIFICANT CHANGE UP (ref 135–145)
TROPONIN I SERPL-MCNC: 0.05 NG/ML — HIGH (ref 0–0.04)
TROPONIN I SERPL-MCNC: 0.06 NG/ML — HIGH (ref 0–0.04)
TSH SERPL-MCNC: 1.28 UU/ML — SIGNIFICANT CHANGE UP (ref 0.34–4.82)
WBC # BLD: 7.3 K/UL — SIGNIFICANT CHANGE UP (ref 3.8–10.5)
WBC # FLD AUTO: 7.3 K/UL — SIGNIFICANT CHANGE UP (ref 3.8–10.5)

## 2017-07-30 PROCEDURE — 71010: CPT | Mod: 26

## 2017-07-30 PROCEDURE — 99285 EMERGENCY DEPT VISIT HI MDM: CPT

## 2017-07-30 RX ORDER — METOPROLOL TARTRATE 50 MG
25 TABLET ORAL
Qty: 0 | Refills: 0 | Status: DISCONTINUED | OUTPATIENT
Start: 2017-07-30 | End: 2017-07-31

## 2017-07-30 RX ORDER — FUROSEMIDE 40 MG
40 TABLET ORAL DAILY
Qty: 0 | Refills: 0 | Status: DISCONTINUED | OUTPATIENT
Start: 2017-07-30 | End: 2017-08-03

## 2017-07-30 RX ORDER — POTASSIUM CHLORIDE 20 MEQ
40 PACKET (EA) ORAL ONCE
Qty: 0 | Refills: 0 | Status: COMPLETED | OUTPATIENT
Start: 2017-07-30 | End: 2017-07-30

## 2017-07-30 RX ORDER — ZALEPLON 10 MG
5 CAPSULE ORAL ONCE
Qty: 0 | Refills: 0 | Status: DISCONTINUED | OUTPATIENT
Start: 2017-07-30 | End: 2017-07-30

## 2017-07-30 RX ORDER — METOPROLOL TARTRATE 50 MG
25 TABLET ORAL
Qty: 0 | Refills: 0 | Status: DISCONTINUED | OUTPATIENT
Start: 2017-07-30 | End: 2017-08-03

## 2017-07-30 RX ORDER — ATORVASTATIN CALCIUM 80 MG/1
40 TABLET, FILM COATED ORAL AT BEDTIME
Qty: 0 | Refills: 0 | Status: DISCONTINUED | OUTPATIENT
Start: 2017-07-30 | End: 2017-08-03

## 2017-07-30 RX ORDER — DIGOXIN 250 MCG
0.12 TABLET ORAL DAILY
Qty: 0 | Refills: 0 | Status: DISCONTINUED | OUTPATIENT
Start: 2017-07-30 | End: 2017-08-03

## 2017-07-30 RX ORDER — FUROSEMIDE 40 MG
40 TABLET ORAL DAILY
Qty: 0 | Refills: 0 | Status: DISCONTINUED | OUTPATIENT
Start: 2017-07-30 | End: 2017-07-30

## 2017-07-30 RX ORDER — FUROSEMIDE 40 MG
80 TABLET ORAL ONCE
Qty: 0 | Refills: 0 | Status: COMPLETED | OUTPATIENT
Start: 2017-07-30 | End: 2017-07-30

## 2017-07-30 RX ORDER — ASPIRIN/CALCIUM CARB/MAGNESIUM 324 MG
162 TABLET ORAL DAILY
Qty: 0 | Refills: 0 | Status: DISCONTINUED | OUTPATIENT
Start: 2017-07-30 | End: 2017-07-30

## 2017-07-30 RX ORDER — ASPIRIN/CALCIUM CARB/MAGNESIUM 324 MG
81 TABLET ORAL DAILY
Qty: 0 | Refills: 0 | Status: DISCONTINUED | OUTPATIENT
Start: 2017-07-30 | End: 2017-08-03

## 2017-07-30 RX ORDER — LISINOPRIL 2.5 MG/1
5 TABLET ORAL DAILY
Qty: 0 | Refills: 0 | Status: DISCONTINUED | OUTPATIENT
Start: 2017-07-30 | End: 2017-08-03

## 2017-07-30 RX ORDER — MECLIZINE HCL 12.5 MG
25 TABLET ORAL THREE TIMES A DAY
Qty: 0 | Refills: 0 | Status: DISCONTINUED | OUTPATIENT
Start: 2017-07-30 | End: 2017-08-01

## 2017-07-30 RX ORDER — INSULIN LISPRO 100/ML
VIAL (ML) SUBCUTANEOUS
Qty: 0 | Refills: 0 | Status: DISCONTINUED | OUTPATIENT
Start: 2017-07-30 | End: 2017-08-03

## 2017-07-30 RX ORDER — WARFARIN SODIUM 2.5 MG/1
5 TABLET ORAL DAILY
Qty: 0 | Refills: 0 | Status: DISCONTINUED | OUTPATIENT
Start: 2017-07-30 | End: 2017-07-31

## 2017-07-30 RX ORDER — PROBENECID/COLCHICINE
1 TABLET ORAL
Qty: 0 | Refills: 0 | COMMUNITY

## 2017-07-30 RX ADMIN — Medication 40 MILLIEQUIVALENT(S): at 15:55

## 2017-07-30 RX ADMIN — Medication 80 MILLIGRAM(S): at 15:55

## 2017-07-30 RX ADMIN — Medication 30 MILLILITER(S): at 23:43

## 2017-07-30 RX ADMIN — Medication 162 MILLIGRAM(S): at 15:55

## 2017-07-30 RX ADMIN — Medication 25 MILLIGRAM(S): at 18:39

## 2017-07-30 RX ADMIN — Medication 25 MILLIGRAM(S): at 22:20

## 2017-07-30 RX ADMIN — ATORVASTATIN CALCIUM 40 MILLIGRAM(S): 80 TABLET, FILM COATED ORAL at 22:20

## 2017-07-30 RX ADMIN — WARFARIN SODIUM 5 MILLIGRAM(S): 2.5 TABLET ORAL at 22:20

## 2017-07-30 NOTE — ED PROVIDER NOTE - CARE PLAN
Principal Discharge DX:	CHF exacerbation Principal Discharge DX:	CHF exacerbation  Secondary Diagnosis:	Hypokalemia

## 2017-07-30 NOTE — ED PROVIDER NOTE - MEDICAL DECISION MAKING DETAILS
75 y/o female, pmhx chf, afib, c/o palpitation; EKG 77 y/o female, pmhx chf, afib, c/o palpitation; EKG afb, inverted T, VSS, afebrile BNP elevated 5125, + trop 0.052 (chronic), will give lasix, asa, admit to tele

## 2017-07-30 NOTE — ED ADULT NURSE NOTE - OBJECTIVE STATEMENT
RN MELLY SY covering notes: AOX3 ambulates with a cane patient reports chest pain and shortness of breath x last night. Patient denies any numbness or tingling sensation. Noted with +2 pitting dionicio edema

## 2017-07-30 NOTE — H&P ADULT - RS GEN PE MLT RESP DETAILS PC
no wheezes/respirations non-labored/rales/no rhonchi/airway patent/no intercostal retractions/no chest wall tenderness

## 2017-07-30 NOTE — H&P ADULT - NSHPLABSRESULTS_GEN_ALL_CORE
10.3   7.3   )-----------( 261      ( 30 Jul 2017 13:50 )             33.8       07-30    142  |  99  |  14  ----------------------------<  103<H>  3.0<L>   |  31  |  1.45<H>    Ca    8.2<L>      30 Jul 2017 13:50  Mg     1.8     07-30    TPro  6.8  /  Alb  3.4<L>  /  TBili  1.6<H>  /  DBili  x   /  AST  38  /  ALT  28  /  AlkPhos  77  07-30                  PT/INR - ( 30 Jul 2017 13:50 )   PT: 22.6 sec;   INR: 2.04 ratio           CAPILLARY BLOOD GLUCOSE  114 (30 Jul 2017 13:19)        CARDIAC MARKERS ( 30 Jul 2017 13:50 )  0.052 ng/mL / x     / 121 U/L / x     / x            Creatine Kinase, Serum (07.30.17 @ 13:50)    Creatine Kinase, Serum: 121 U/L    Thyroid Stimulating Hormone, Serum (07.30.17 @ 13:50)    Thyroid Stimulating Hormone, Serum: 1.28 uU/mL        Serum Pro-Brain Natriuretic Peptide (07.30.17 @ 13:50)    Serum Pro-Brain Natriuretic Peptide: 5124: Interpretive guide for NT PRO-BNP              RADIOLOGY:    EXAM:  CHEST-PORTABLE STAT                            PROCEDURE DATE:  07/30/2017          INTERPRETATION:  AP semisupine chest on July 30 at 2:00 PM. Patient has   chest pain.    Heart may be enlarged.    Vascular markings may be minimally more accentuated than on June 19 of   this year. Conceivably there could be a slight state of CHF.    IMPRESSION: As above.          EKG: Afib, known T wave inversion in lateral and inferior leads, left axis deviation.

## 2017-07-30 NOTE — H&P ADULT - PROBLEM SELECTOR PLAN 1
plan: stable  Ordered Lasix 80 mg IV once.  Will continue with Lasix 40 mg IV   CXR: shows slight CHF  Daily wt/ strict lnput/output  Trend Troponins  B/L LE U/S Doppler ordered plan: stable  Ordered Lasix 80 mg IV once.  Will continue with Lasix 40 mg IV   CXR: shows slight CHF  Daily wt/ strict lnput/output  Trend Troponins  B/L LE U/S Doppler ordered  BNP: 5,124  B/L crackles on auscultation, pitting edema B/L 2+ plan: stable, B/L crackles on auscultation, pitting edema B/L 2+  CXR: shows slight CHF,  BNP: 5,124  Ordered Lasix 80 mg IV once.  Will continue with Lasix 40 mg IV   Daily weight  strict lnput/output  Trend Troponins  B/L LE U/S Doppler ordered

## 2017-07-30 NOTE — H&P ADULT - PROBLEM SELECTOR PLAN 6
Improve VTE score 2  Patient on Coumadin possibly anemia of chronic disease  ordered iron panel  will give iron supplement

## 2017-07-30 NOTE — ED PROVIDER NOTE - PMH
CHF (congestive heart failure)    Diabetes    Fluid retention    Hypertension    Irregular heart beat

## 2017-07-30 NOTE — ED PROVIDER NOTE - OBJECTIVE STATEMENT
75 y/o female, PMHx HTN, CHF, DM, afib, fluid retention, c/o palpitations x3 days and worsening bl leg swelling x2-3 weeks. Denies cp, , dyspnea, diaphoresis, hemoptysis, cough, fever/chills, hx dvt, calf pain or any other concerns. Pt states she takes warfarin every few days, on lasix.

## 2017-07-30 NOTE — H&P ADULT - PROBLEM SELECTOR PLAN 2
Plan: stable  Continue with digoxin 0.125 mcg QD, ASA 81 mg PO QD, Atorvastatin 40 mg PO QD, and Metoprolol 25 mg PO BID Plan: stable  Continue with digoxin 0.125 mcg QD, ASA 81 mg PO QD, Atorvastatin 40 mg PO QD, and Metoprolol 25 mg PO BID  Ordered digoxin serum levels

## 2017-07-30 NOTE — H&P ADULT - HISTORY OF PRESENT ILLNESS
77 yo female w/ PMHx CHF, Afib on Coumadin, DMT2, vertigo. Comes to the ED c/o palpitations that started in the morning after walking to the bathroom, accompanied by dyspnea on exertion. Patient was admitted on June 19 for bronchitis and CHF exacerbation. Patient also refers bilateral leg edema. Also refers orthopnea and paroxysmal nocturnal dyspnea. Sleeps with 3 pillows.    Upon bedside assessment, patient is in no distress, AAOx3, lying comfortably in bed.    Denies headache, visual changes, fever, chills, nausea, vomiting, chest pain, abdominal pain, diarrhea, dysuria. 75 yo female w/ PMHx CHF, Afib on Coumadin, DMT2, vertigo. Comes to the ED c/o palpitations that started in the morning after walking to the bathroom, accompanied by dyspnea on exertion. Patient was admitted on June 19 for bronchitis and CHF exacerbation. Patient also refers bilateral leg edema and dry cough. Also refers orthopnea and paroxysmal nocturnal dyspnea. Sleeps with 3 pillows.    Upon bedside assessment, patient is in no distress, AAOx3, lying comfortably in bed.    Denies headache, visual changes, fever, chills, nausea, vomiting, chest pain, abdominal pain, diarrhea, dysuria.

## 2017-07-30 NOTE — H&P ADULT - NSHPSOCIALHISTORY_GEN_ALL_CORE
Tobacco use: smoked for 15 yrs, 3-4 cigarettes. Quitted 30 yrs ago.  ETOH: negative  Illicit drug use: negative

## 2017-07-30 NOTE — H&P ADULT - PROBLEM SELECTOR PLAN 3
K: 3.0  Given 40 K PO once  f/up K K serum levels: 3.0  Given 40mEq potassium chloride PO once  f/up K

## 2017-07-30 NOTE — H&P ADULT - ASSESSMENT
75yo  female from home, walks wih a cane, PMHx HTN, CHF, DMT2, Afib on Coumadin, vertigo. Presented to the ED complaining of palpitations and exertional dyspnea.

## 2017-07-30 NOTE — H&P ADULT - PROBLEM SELECTOR PLAN 7
Improve VTE score 2  Patient on Coumadin Cr: 1.45, GFR :41, hx of elevated Cr, hx of decreased GFR    avoid nephrotoxic drugs  monitor creatinine

## 2017-07-31 LAB
24R-OH-CALCIDIOL SERPL-MCNC: 17.3 NG/ML — LOW (ref 30–100)
ALBUMIN SERPL ELPH-MCNC: 3.7 G/DL — SIGNIFICANT CHANGE UP (ref 3.5–5)
ALP SERPL-CCNC: 83 U/L — SIGNIFICANT CHANGE UP (ref 40–120)
ALT FLD-CCNC: 39 U/L DA — SIGNIFICANT CHANGE UP (ref 10–60)
ANION GAP SERPL CALC-SCNC: 12 MMOL/L — SIGNIFICANT CHANGE UP (ref 5–17)
APPEARANCE UR: CLEAR — SIGNIFICANT CHANGE UP
APTT BLD: 33.5 SEC — SIGNIFICANT CHANGE UP (ref 27.5–37.4)
AST SERPL-CCNC: 46 U/L — HIGH (ref 10–40)
BILIRUB DIRECT SERPL-MCNC: 0.4 MG/DL — HIGH (ref 0–0.2)
BILIRUB INDIRECT FLD-MCNC: 1.7 MG/DL — HIGH (ref 0.2–1)
BILIRUB SERPL-MCNC: 2.1 MG/DL — HIGH (ref 0.2–1.2)
BILIRUB UR-MCNC: NEGATIVE — SIGNIFICANT CHANGE UP
BUN SERPL-MCNC: 17 MG/DL — SIGNIFICANT CHANGE UP (ref 7–18)
CALCIUM SERPL-MCNC: 8.8 MG/DL — SIGNIFICANT CHANGE UP (ref 8.4–10.5)
CHLORIDE SERPL-SCNC: 97 MMOL/L — SIGNIFICANT CHANGE UP (ref 96–108)
CO2 SERPL-SCNC: 32 MMOL/L — HIGH (ref 22–31)
COLOR SPEC: YELLOW — SIGNIFICANT CHANGE UP
CREAT SERPL-MCNC: 1.39 MG/DL — HIGH (ref 0.5–1.3)
DIFF PNL FLD: NEGATIVE — SIGNIFICANT CHANGE UP
DIGOXIN SERPL-MCNC: 0.1 NG/ML — LOW (ref 0.8–2)
FERRITIN SERPL-MCNC: 56 NG/ML — SIGNIFICANT CHANGE UP (ref 15–150)
GLUCOSE SERPL-MCNC: 103 MG/DL — HIGH (ref 70–99)
GLUCOSE UR QL: NEGATIVE — SIGNIFICANT CHANGE UP
HBA1C BLD-MCNC: 6.4 % — HIGH (ref 4–5.6)
HCT VFR BLD CALC: 35.7 % — SIGNIFICANT CHANGE UP (ref 34.5–45)
HGB BLD-MCNC: 11 G/DL — LOW (ref 11.5–15.5)
INR BLD: 2.12 RATIO — HIGH (ref 0.88–1.16)
IRON SATN MFR SERPL: 10 % — LOW (ref 15–50)
IRON SATN MFR SERPL: 35 UG/DL — LOW (ref 40–150)
KETONES UR-MCNC: NEGATIVE — SIGNIFICANT CHANGE UP
LEUKOCYTE ESTERASE UR-ACNC: NEGATIVE — SIGNIFICANT CHANGE UP
MAGNESIUM SERPL-MCNC: 1.8 MG/DL — SIGNIFICANT CHANGE UP (ref 1.6–2.6)
MCHC RBC-ENTMCNC: 21.6 PG — LOW (ref 27–34)
MCHC RBC-ENTMCNC: 30.8 GM/DL — LOW (ref 32–36)
MCV RBC AUTO: 70 FL — LOW (ref 80–100)
NITRITE UR-MCNC: NEGATIVE — SIGNIFICANT CHANGE UP
PH UR: 9 — HIGH (ref 5–8)
PHOSPHATE SERPL-MCNC: 2.8 MG/DL — SIGNIFICANT CHANGE UP (ref 2.5–4.5)
PLATELET # BLD AUTO: 284 K/UL — SIGNIFICANT CHANGE UP (ref 150–400)
POTASSIUM SERPL-MCNC: 3.6 MMOL/L — SIGNIFICANT CHANGE UP (ref 3.5–5.3)
POTASSIUM SERPL-SCNC: 3.6 MMOL/L — SIGNIFICANT CHANGE UP (ref 3.5–5.3)
PROT SERPL-MCNC: 7.3 G/DL — SIGNIFICANT CHANGE UP (ref 6–8.3)
PROT UR-MCNC: 30 MG/DL
PROTHROM AB SERPL-ACNC: 23.5 SEC — HIGH (ref 9.8–12.7)
RBC # BLD: 5.1 M/UL — SIGNIFICANT CHANGE UP (ref 3.8–5.2)
RBC # FLD: 15.9 % — HIGH (ref 10.3–14.5)
SODIUM SERPL-SCNC: 141 MMOL/L — SIGNIFICANT CHANGE UP (ref 135–145)
SP GR SPEC: 1.01 — SIGNIFICANT CHANGE UP (ref 1.01–1.02)
TIBC SERPL-MCNC: 358 UG/DL — SIGNIFICANT CHANGE UP (ref 250–450)
TROPONIN I SERPL-MCNC: 0.05 NG/ML — HIGH (ref 0–0.04)
UIBC SERPL-MCNC: 323 UG/DL — SIGNIFICANT CHANGE UP (ref 110–370)
UROBILINOGEN FLD QL: NEGATIVE — SIGNIFICANT CHANGE UP
VIT B12 SERPL-MCNC: 132 PG/ML — LOW (ref 243–894)
WBC # BLD: 8.5 K/UL — SIGNIFICANT CHANGE UP (ref 3.8–10.5)
WBC # FLD AUTO: 8.5 K/UL — SIGNIFICANT CHANGE UP (ref 3.8–10.5)

## 2017-07-31 PROCEDURE — 93970 EXTREMITY STUDY: CPT | Mod: 26

## 2017-07-31 RX ORDER — METOPROLOL TARTRATE 50 MG
1 TABLET ORAL
Qty: 0 | Refills: 0 | COMMUNITY
Start: 2017-07-31

## 2017-07-31 RX ORDER — FUROSEMIDE 40 MG
40 TABLET ORAL
Qty: 0 | Refills: 0 | COMMUNITY
Start: 2017-07-31

## 2017-07-31 RX ORDER — ASPIRIN/CALCIUM CARB/MAGNESIUM 324 MG
1 TABLET ORAL
Qty: 0 | Refills: 0 | COMMUNITY

## 2017-07-31 RX ORDER — LISINOPRIL 2.5 MG/1
1 TABLET ORAL
Qty: 0 | Refills: 0 | COMMUNITY
Start: 2017-07-31

## 2017-07-31 RX ORDER — ACETAMINOPHEN 500 MG
500 TABLET ORAL EVERY 6 HOURS
Qty: 0 | Refills: 0 | Status: DISCONTINUED | OUTPATIENT
Start: 2017-07-31 | End: 2017-08-03

## 2017-07-31 RX ORDER — INSULIN LISPRO 100/ML
0 VIAL (ML) SUBCUTANEOUS
Qty: 0 | Refills: 0 | COMMUNITY
Start: 2017-07-31

## 2017-07-31 RX ORDER — SIMETHICONE 80 MG/1
1 TABLET, CHEWABLE ORAL
Qty: 0 | Refills: 0 | COMMUNITY
Start: 2017-07-31

## 2017-07-31 RX ORDER — FERROUS SULFATE 325(65) MG
1 TABLET ORAL
Qty: 0 | Refills: 0 | COMMUNITY
Start: 2017-07-31

## 2017-07-31 RX ORDER — DIGOXIN 250 MCG
1 TABLET ORAL
Qty: 0 | Refills: 0 | COMMUNITY

## 2017-07-31 RX ORDER — ATORVASTATIN CALCIUM 80 MG/1
1 TABLET, FILM COATED ORAL
Qty: 0 | Refills: 0 | COMMUNITY
Start: 2017-07-31

## 2017-07-31 RX ORDER — FUROSEMIDE 40 MG
1 TABLET ORAL
Qty: 0 | Refills: 0 | COMMUNITY

## 2017-07-31 RX ORDER — LISINOPRIL 2.5 MG/1
1 TABLET ORAL
Qty: 0 | Refills: 0 | COMMUNITY

## 2017-07-31 RX ORDER — MECLIZINE HCL 12.5 MG
1 TABLET ORAL
Qty: 0 | Refills: 0 | COMMUNITY
Start: 2017-07-31

## 2017-07-31 RX ORDER — ASPIRIN/CALCIUM CARB/MAGNESIUM 324 MG
1 TABLET ORAL
Qty: 0 | Refills: 0 | COMMUNITY
Start: 2017-07-31

## 2017-07-31 RX ORDER — ACETAMINOPHEN 500 MG
1 TABLET ORAL
Qty: 0 | Refills: 0 | COMMUNITY
Start: 2017-07-31

## 2017-07-31 RX ORDER — DIGOXIN 250 MCG
1 TABLET ORAL
Qty: 0 | Refills: 0 | COMMUNITY
Start: 2017-07-31

## 2017-07-31 RX ORDER — SIMETHICONE 80 MG/1
80 TABLET, CHEWABLE ORAL EVERY 6 HOURS
Qty: 0 | Refills: 0 | Status: DISCONTINUED | OUTPATIENT
Start: 2017-07-31 | End: 2017-08-03

## 2017-07-31 RX ORDER — MECLIZINE HCL 12.5 MG
1 TABLET ORAL
Qty: 0 | Refills: 0 | COMMUNITY

## 2017-07-31 RX ADMIN — Medication 25 MILLIGRAM(S): at 21:50

## 2017-07-31 RX ADMIN — Medication 0.12 MILLIGRAM(S): at 06:27

## 2017-07-31 RX ADMIN — Medication 40 MILLIGRAM(S): at 06:27

## 2017-07-31 RX ADMIN — SIMETHICONE 80 MILLIGRAM(S): 80 TABLET, CHEWABLE ORAL at 17:54

## 2017-07-31 RX ADMIN — Medication 5 MILLIGRAM(S): at 00:05

## 2017-07-31 RX ADMIN — ATORVASTATIN CALCIUM 40 MILLIGRAM(S): 80 TABLET, FILM COATED ORAL at 21:50

## 2017-07-31 RX ADMIN — LISINOPRIL 5 MILLIGRAM(S): 2.5 TABLET ORAL at 06:27

## 2017-07-31 RX ADMIN — Medication 81 MILLIGRAM(S): at 11:41

## 2017-07-31 RX ADMIN — Medication 25 MILLIGRAM(S): at 06:27

## 2017-07-31 RX ADMIN — Medication 25 MILLIGRAM(S): at 17:46

## 2017-07-31 NOTE — CHART NOTE - NSCHARTNOTEFT_GEN_A_CORE
Patient for transfer to have cardiac cath  at [facility]. Not transferred today due to INR result 2.12. Coumadin on hold as per cardiology. Will reevaluate with AM INR.

## 2017-07-31 NOTE — PROGRESS NOTE ADULT - SUBJECTIVE AND OBJECTIVE BOX
LUCY LIM  [id number]    Patient is a 76y old  Female who presents with a chief complaint of Palpitations, dyspnea and bilateral leg edema (2017 17:43)      patient seen and examined   s doing better ,  sob, still has leg edema    MEDICATIONS  (STANDING):  lisinopril 5 milliGRAM(s) Oral daily  aspirin  chewable 81 milliGRAM(s) Oral daily  digoxin     Tablet 0.125 milliGRAM(s) Oral daily  meclizine 25 milliGRAM(s) Oral three times a day  atorvastatin 40 milliGRAM(s) Oral at bedtime  warfarin 5 milliGRAM(s) Oral daily  metoprolol 25 milliGRAM(s) Oral two times a day  furosemide   Injectable 40 milliGRAM(s) IV Push daily  insulin lispro (HumaLOG) corrective regimen sliding scale   SubCutaneous three times a day before meals      MEDICATIONS  (PRN):  simethicone 80 milliGRAM(s) Chew every 6 hours PRN Gas  acetaminophen   Tablet. 500 milliGRAM(s) Oral every 6 hours PRN Moderate Pain (4 - 6)      Allergies    clavulanate (Unknown)  erythromycin (Hives; Rash)    Intolerances        PAST MEDICAL & SURGICAL HISTORY:  CHF (congestive heart failure)  Fluid retention  Irregular heart beat  Hypertension  Diabetes  No significant past surgical history      FAMILY HISTORY:  No pertinent family history in first degree relatives      SOCIAL HISTORY  Smoking History:     REVIEW OF SYSTEMS:    CONSTITUTIONAL:  Fevers [  ]  Yes  [ x ]  No                                   chills [  ]  Yes  [ x ]  No                               sweats  [  ]  Yes  [ x ]  No                                fatigue [ x  Yes  [  ]  No    HEENT:  Eyes:  Diplopia or blurred vision [  ]  Yes  [ x ]  No    ENT:                        earache  [  ]  Yes  [x  ]  No                             sore throat  [  ]  Yes  [ x ]  No                            runny nose.  [  ]  Yes  [x  ]  No    CARDIOVASCULAR:       chest pain  [  ]  Yes  [ x ]  No                        squeezing, tightness,  [  ]  Yes  [x  ]  No                                      palpitations.  [  ]  Yes  [x  ]  No    RESPIRATORY:             Cough [  ]  Yes  [x  ]  No                                  Wheezing [  ]  Yes  [ x ]  No                                Hemoptysis [  ]  Yes  [x  ]  No                                      Sputum [  ]  Yes  [x  ]  No                   Shortness of Breathe [ x ]  Yes  [  ]  No    GASTROINTESTINAL:      Abdominal pain  [  ]  Yes  [ x ]  No                                                    Nausea  [  ]  Yes  [ x ]  No                                                   Vomiting [  ]  Yes  [ x ]  No                                              Constipation [  ]  Yes  [ x ]  No                                                    Diarrhea [  ]  Yes  [ xx ]  No    GENITOURINARY:           Incontinence [  ]  Yes  [x  ]  No                                                Dysuria [  ]  Yes  [ x ]  No                                      Blood in Urine  [  ]  Yes  x[  ]  No                          Frequency or urgency.  [  ]  Yes  [x  ]  No    NEUROLOGIC:                     Paresthesias  [  ]  Yes  [ x ]  No                                             fasciculations  [  ]  Yes  [x  ]  No                                seizures or weakness.  [  ]  Yes  x[  ]  No                                                   Headache [  ]  Yes  [  x]  No                                  Loss of Conciousness [  ]  Yes  [x  ]  No                                                     Insomnia [  ]  Yes  [  x]  No    PSYCHIATRIC:    Disorder of thought or mood.  [  ]  Yes  x[  ]  No                                                           Anxiety [  ]  Yes  [x  ]  No                                                      Depression [  ]  Yes  [x  ]  No                                                         Dementia [  ]  Yes  [ x ]  No    Vital Signs Last 24 Hrs  T(C): 36.9 (2017 07:27), Max: 37.1 (2017 15:22)  T(F): 98.4 (2017 07:27), Max: 98.8 (2017 15:22)  HR: 80 (2017 07:27) (77 - 130)  BP: 135/97 (2017 07:27) (109/84 - 152/92)  BP(mean): --  RR: 18 (2017 07:27) (16 - 20)  SpO2: 98% (2017 07:27) (96% - 100%)  I&O's Detail      PHYSICAL EXAMINATION:    GENERAL: The patient is a well-developed, well-nourished female_____in no apparent distress.     HEENT: Head is normocephalic and atraumatic. Extraocular muscles are intact. Mucous membranes are moist.     NECK: Supple. jvd [ x ]  Yes  [  ]  No    LUNGS: Clear to auscultation  [  ]  Yes  [  x]  No                               wheezing, [  ]  Yes  [x  ]  No                                      rales  [ x ]  Yes  [  ]  No                                    rhonchi  [  ]  Yes  [ x ]  No                 Respirations labored  [  ]  Yes  [ x ]  No    HEART: Regular rate and rhythm  [ x ]  Yes  [  ]  No                                        Murmur [  ]  Yes  [x  ]  No                                              rub  [  ]  Yes  [ x ]  No                                          gallop  [  ]  Yes  [ x ]  No    ABDOMEN:                                 Soft, [ x ]  Yes  [  ]  No                                             nontender [  ]  Yes  [x  ]  No                                             distended.[  ]  Yes  [ x ]  No                            hepatosplenomegaly ,[  ]  Yes  [ x ]  No                                                    BS   [ x ]  Yes  [  ]  No    EXTREMITIES:                cyanosis, [  ]  Yes  [x  ]  No                                       clubbing,   [  ]  Yes  [x  ]  No                                               rash, [  ]  Yes  [xx  ]  No                                           edema.  [  ]  Yes  [ x ]  No    NEUROLOGIC: Alert and Oriented  [ x ] Person [ x ] Time  [x ] Place                                    Cranial nerves intact    [  x]  Yes  [  ]  No                                               sensory Intact  [  x]  Yes  [  ]  No                                                  motor WNL   [  x]  Yes  [  ]  No                                                Jose Paresis  [  ]  Yes  x[  ]  No  DTR  babinski+ or -      LABS:                        11.0   8.5   )-----------( 284      ( 2017 06:37 )             35.7     -    141  |  97  |  17  ----------------------------<  103<H>  3.6   |  32<H>  |  1.39<H>    Ca    8.8      2017 06:37  Phos  2.8       Mg     1.8         TPro  7.3  /  Alb  3.7  /  TBili  2.1<H>  /  DBili  0.4<H>  /  AST  46<H>  /  ALT  39  /  AlkPhos  83      PT/INR - ( 2017 13:50 )   PT: 22.6 sec;   INR: 2.04 ratio           Urinalysis Basic - ( 2017 01:54 )    Color: Yellow / Appearance: Clear / S.015 / pH: x  Gluc: x / Ketone: Negative  / Bili: Negative / Urobili: Negative   Blood: x / Protein: 30 mg/dL / Nitrite: Negative   Leuk Esterase: Negative / RBC: 0-2 /HPF / WBC 0-2 /HPF   Sq Epi: x / Non Sq Epi: Negative / Bacteria: Negative /HPF        CARDIAC MARKERS ( 2017 01:54 )  0.051 ng/mL / x     / x     / x     / x      CARDIAC MARKERS ( 2017 19:32 )  0.061 ng/mL / x     / x     / x     / x      CARDIAC MARKERS ( 2017 13:50 )  0.052 ng/mL / x     / 121 U/L / x     / x            Serum Pro-Brain Natriuretic Peptide: 5124 pg/mL (17 @ 13:50)          MICROBIOLOGY:    RADIOLOGY & ADDITIONAL STUDIES:    CXR:    Ct scan chest:    ekg;    echo:

## 2017-07-31 NOTE — ACUTE INTERFACILITY TRANSFER NOTE - PLAN OF CARE
pt will remain symptom free Diabetic/DASH/ TLC diet, 1L fluid restriction  Activity as tolerated likely iron deficiency  -continue iron supplements baseline cr 1.29.   improving 1.45---->1.39  -follow up as outpatient takes continue Lasix 3.0 ----3.6  -continue to supplement at needed continue digoxin, lisinopril and metoprolol takes glipizide and metformin at home  -monitor fingersticks  -insulin while in hospital  -HgbA1c 6.4. Follow up in 3months

## 2017-08-01 DIAGNOSIS — I50.9 HEART FAILURE, UNSPECIFIED: ICD-10-CM

## 2017-08-01 DIAGNOSIS — I48.91 UNSPECIFIED ATRIAL FIBRILLATION: ICD-10-CM

## 2017-08-01 LAB
ALBUMIN SERPL ELPH-MCNC: 3 G/DL — LOW (ref 3.5–5)
ALP SERPL-CCNC: 67 U/L — SIGNIFICANT CHANGE UP (ref 40–120)
ALT FLD-CCNC: 38 U/L DA — SIGNIFICANT CHANGE UP (ref 10–60)
ANION GAP SERPL CALC-SCNC: 9 MMOL/L — SIGNIFICANT CHANGE UP (ref 5–17)
APTT BLD: 32.3 SEC — SIGNIFICANT CHANGE UP (ref 27.5–37.4)
AST SERPL-CCNC: 37 U/L — SIGNIFICANT CHANGE UP (ref 10–40)
BASOPHILS # BLD AUTO: 0.1 K/UL — SIGNIFICANT CHANGE UP (ref 0–0.2)
BASOPHILS NFR BLD AUTO: 0.9 % — SIGNIFICANT CHANGE UP (ref 0–2)
BILIRUB SERPL-MCNC: 1.5 MG/DL — HIGH (ref 0.2–1.2)
BUN SERPL-MCNC: 16 MG/DL — SIGNIFICANT CHANGE UP (ref 7–18)
CALCIUM SERPL-MCNC: 8 MG/DL — LOW (ref 8.4–10.5)
CHLORIDE SERPL-SCNC: 100 MMOL/L — SIGNIFICANT CHANGE UP (ref 96–108)
CO2 SERPL-SCNC: 30 MMOL/L — SIGNIFICANT CHANGE UP (ref 22–31)
CREAT SERPL-MCNC: 1.21 MG/DL — SIGNIFICANT CHANGE UP (ref 0.5–1.3)
EOSINOPHIL # BLD AUTO: 0.2 K/UL — SIGNIFICANT CHANGE UP (ref 0–0.5)
EOSINOPHIL NFR BLD AUTO: 2 % — SIGNIFICANT CHANGE UP (ref 0–6)
GLUCOSE SERPL-MCNC: 124 MG/DL — HIGH (ref 70–99)
HCT VFR BLD CALC: 35.7 % — SIGNIFICANT CHANGE UP (ref 34.5–45)
HGB BLD-MCNC: 11.2 G/DL — LOW (ref 11.5–15.5)
INR BLD: 2.4 RATIO — HIGH (ref 0.88–1.16)
LYMPHOCYTES # BLD AUTO: 1.5 K/UL — SIGNIFICANT CHANGE UP (ref 1–3.3)
LYMPHOCYTES # BLD AUTO: 18.5 % — SIGNIFICANT CHANGE UP (ref 13–44)
MAGNESIUM SERPL-MCNC: 1.7 MG/DL — SIGNIFICANT CHANGE UP (ref 1.6–2.6)
MCHC RBC-ENTMCNC: 22.3 PG — LOW (ref 27–34)
MCHC RBC-ENTMCNC: 31.3 GM/DL — LOW (ref 32–36)
MCV RBC AUTO: 71.3 FL — LOW (ref 80–100)
MONOCYTES # BLD AUTO: 0.7 K/UL — SIGNIFICANT CHANGE UP (ref 0–0.9)
MONOCYTES NFR BLD AUTO: 8.9 % — SIGNIFICANT CHANGE UP (ref 2–14)
NEUTROPHILS # BLD AUTO: 5.6 K/UL — SIGNIFICANT CHANGE UP (ref 1.8–7.4)
NEUTROPHILS NFR BLD AUTO: 69.7 % — SIGNIFICANT CHANGE UP (ref 43–77)
PHOSPHATE SERPL-MCNC: 2.3 MG/DL — LOW (ref 2.5–4.5)
PLATELET # BLD AUTO: 228 K/UL — SIGNIFICANT CHANGE UP (ref 150–400)
POTASSIUM SERPL-MCNC: 2.9 MMOL/L — CRITICAL LOW (ref 3.5–5.3)
POTASSIUM SERPL-SCNC: 2.9 MMOL/L — CRITICAL LOW (ref 3.5–5.3)
PROT SERPL-MCNC: 6.3 G/DL — SIGNIFICANT CHANGE UP (ref 6–8.3)
PROTHROM AB SERPL-ACNC: 26.6 SEC — HIGH (ref 9.8–12.7)
RBC # BLD: 5.01 M/UL — SIGNIFICANT CHANGE UP (ref 3.8–5.2)
RBC # FLD: 15.8 % — HIGH (ref 10.3–14.5)
SODIUM SERPL-SCNC: 139 MMOL/L — SIGNIFICANT CHANGE UP (ref 135–145)
WBC # BLD: 8.1 K/UL — SIGNIFICANT CHANGE UP (ref 3.8–10.5)
WBC # FLD AUTO: 8.1 K/UL — SIGNIFICANT CHANGE UP (ref 3.8–10.5)

## 2017-08-01 RX ORDER — POTASSIUM CHLORIDE 20 MEQ
10 PACKET (EA) ORAL
Qty: 0 | Refills: 0 | Status: DISCONTINUED | OUTPATIENT
Start: 2017-08-01 | End: 2017-08-01

## 2017-08-01 RX ORDER — POTASSIUM CHLORIDE 20 MEQ
20 PACKET (EA) ORAL
Qty: 0 | Refills: 0 | Status: COMPLETED | OUTPATIENT
Start: 2017-08-01 | End: 2017-08-01

## 2017-08-01 RX ORDER — POTASSIUM CHLORIDE 20 MEQ
40 PACKET (EA) ORAL ONCE
Qty: 0 | Refills: 0 | Status: DISCONTINUED | OUTPATIENT
Start: 2017-08-01 | End: 2017-08-01

## 2017-08-01 RX ADMIN — Medication 25 MILLIGRAM(S): at 05:43

## 2017-08-01 RX ADMIN — Medication 20 MILLIEQUIVALENT(S): at 10:46

## 2017-08-01 RX ADMIN — ATORVASTATIN CALCIUM 40 MILLIGRAM(S): 80 TABLET, FILM COATED ORAL at 22:28

## 2017-08-01 RX ADMIN — LISINOPRIL 5 MILLIGRAM(S): 2.5 TABLET ORAL at 05:43

## 2017-08-01 RX ADMIN — Medication 30 MILLILITER(S): at 21:14

## 2017-08-01 RX ADMIN — Medication 81 MILLIGRAM(S): at 12:10

## 2017-08-01 RX ADMIN — Medication 1: at 12:09

## 2017-08-01 RX ADMIN — Medication 40 MILLIGRAM(S): at 05:43

## 2017-08-01 RX ADMIN — Medication 25 MILLIGRAM(S): at 17:14

## 2017-08-01 RX ADMIN — Medication 20 MILLIEQUIVALENT(S): at 12:10

## 2017-08-01 RX ADMIN — Medication 0.12 MILLIGRAM(S): at 05:43

## 2017-08-01 RX ADMIN — Medication 20 MILLIEQUIVALENT(S): at 17:14

## 2017-08-01 NOTE — PROGRESS NOTE ADULT - ASSESSMENT
77 yo female w/ PMHx CHF, Afib on Coumadin, DMT2, vertigo. Comes to the ED c/o palpitations that started in the morning after walking to the bathroom, accompanied by dyspnea on exertion. Patient was admitted on June 19 for bronchitis and CHF exacerbation. Patient also refers bilateral leg edema and dry cough. Also refers orthopnea and paroxysmal nocturnal dyspnea. Sleeps with 3 pillows. Admitted to Medicine Floor for CHF exacerbation.

## 2017-08-01 NOTE — PROGRESS NOTE ADULT - PROBLEM SELECTOR PLAN 2
Off Coumadin due to transfer to [facility] for Off Coumadin due to transfer to [facility] for Cardiac Catheterization.  INR this morning 2.40. Will monitor INR in am tomorrow

## 2017-08-01 NOTE — ED ADULT NURSE REASSESSMENT NOTE - NS ED NURSE REASSESS COMMENT FT1
as per JULIANNE Frazier at [facility] cath lab, as per MD Rosario at [facility] pt to have INR repeated tomorrow and if below 2.0 pt will be sent to cath lab tomorrow. MD Rosario discussed plan with MD Plummer.
endorsed pt to [facility] in holding area in stable condition for continuation of care. pt a&ox3, admitted for CHF. 20G left wrist. for possible transfer to cath lab tomorrow depending on INR result.
Pt OOB to chair Bp 130/102  MD Zuleta made aware via Amion medicated as oredered
received pt A&Ox3 absent any distress or C/O pain. able to make needs know with care provided PRN. will continue with current plan of care
Pt. is alert and in stable condition. No complaints voiced. No signs of distress. Pt. awaiting bed.

## 2017-08-01 NOTE — PROGRESS NOTE ADULT - SUBJECTIVE AND OBJECTIVE BOX
LUCY LIM  MRN-[id number]    Patient is a 76y old  Female who presents with a chief complaint of Palpitations, dyspnea and bilateral leg edema (2017 11:49)    patient seen and examined   s doing better , no sob today    MEDICATIONS  (STANDING):  lisinopril 5 milliGRAM(s) Oral daily  aspirin  chewable 81 milliGRAM(s) Oral daily  digoxin     Tablet 0.125 milliGRAM(s) Oral daily  meclizine 25 milliGRAM(s) Oral three times a day  atorvastatin 40 milliGRAM(s) Oral at bedtime  metoprolol 25 milliGRAM(s) Oral two times a day  furosemide   Injectable 40 milliGRAM(s) IV Push daily  insulin lispro (HumaLOG) corrective regimen sliding scale   SubCutaneous three times a day before meals  potassium chloride    Tablet ER 40 milliEquivalent(s) Oral once      MEDICATIONS  (PRN):  simethicone 80 milliGRAM(s) Chew every 6 hours PRN Gas  acetaminophen   Tablet. 500 milliGRAM(s) Oral every 6 hours PRN Moderate Pain (4 - 6)      Allergies    clavulanate (Unknown)  erythromycin (Hives; Rash)    Intolerances        PAST MEDICAL & SURGICAL HISTORY:  CHF (congestive heart failure)  Fluid retention  Irregular heart beat  Hypertension  Diabetes  No significant past surgical history      FAMILY HISTORY:  No pertinent family history in first degree relatives      SOCIAL HISTORY  Smoking History:     REVIEW OF SYSTEMS:    CONSTITUTIONAL:  Fevers [  ]  Yes  [ x ]  No                                   chills [  ]  Yes  [ x ]  No                               sweats  [  ]  Yes  [  x]  No                                fatigue [  x]  Yes  [  ]  No    HEENT:  Eyes:  Diplopia or blurred vision [  ]  Yes  [ x ]  No    ENT:                        earache  [  ]  Yes  [ x ]  No                             sore throat  [  ]  Yes  [x  ]  No                            runny nose.  [  ]  Yes  [x  ]  No    CARDIOVASCULAR:       chest pain  [  ]  Yes  [ x ]  No                        squeezing, tightness,  [  ]  Yes  [x  ]  No                                      palpitations.  [  ]  Yes  [x  ]  No    RESPIRATORY:             Cough [  ]  Yes  [ x ]  No                                  Wheezing [  ]  Yes  [x  ]  No                                Hemoptysis [  ]  Yes  [  x]  No                                      Sputum [  ]  Yes  [ x ]  No                   Shortness of Breathe [  ]  Yes  [ x ]  No    GASTROINTESTINAL:      Abdominal pain  [  ]  Yes  [x  ]  No                                                    Nausea  [  ]  Yes  [x  ]  No                                                   Vomiting [  ]  Yes  [x  ]  No                                              Constipation [  ]  Yes  [x  ]  No                                                    Diarrhea [  ]  Yes  [ x ]  No    GENITOURINARY:           Incontinence [  ]  Yes  [x  ]  No                                                Dysuria [  ]  Yes  [xx  ]  No                                      Blood in Urine  [  ]  Yes  [ x ]  No                          Frequency or urgency.  [  ]  Yes  [ x ]  No    NEUROLOGIC:                     Paresthesias  [  ]  Yes  [ x ]  No                                             fasciculations  [  ]  Yes  [x  ]  No                                seizures or weakness.  [  ]  Yes  [x  ]  No                                                   Headache [  ]  Yes  [ x ]  No                                  Loss of Conciousness [  ]  Yes  [x  ]  No                                                     Insomnia [  ]  Yes  [ x ]  No  x  PSYCHIATRIC:    Disorder of thought or mood.  [  ]  Yes  [ x ]  No                                                           Anxiety [  ]  Yes  [ x ]  No                                                      Depression [  ]  Yes  [x  ]  No                                                         Dementia [  ]  Yes  [ x ]  No    Vital Signs Last 24 Hrs  T(C): 37.1 (01 Aug 2017 05:02), Max: 37.2 (2017 20:00)  T(F): 98.8 (01 Aug 2017 05:02), Max: 98.9 (2017 20:00)  HR: 94 (01 Aug 2017 07:02) (84 - 100)  BP: 131/76 (01 Aug 2017 07:02) (112/50 - 157/105)  BP(mean): --  RR: 16 (01 Aug 2017 05:02) (16 - 18)  SpO2: 99% (01 Aug 2017 05:02) (96% - 100%)  I&O's Detail      PHYSICAL EXAMINATION:    GENERAL: The patient is a well-developed, well-nourished _____in no apparent distress.     HEENT: Head is normocephalic and atraumatic. Extraocular muscles are intact. Mucous membranes are moist.     NECK: Supple. jvd [  ]  Yes  [ x ]  No    LUNGS: Clear to auscultation  [  ]  Yes  [ x ]  No                               wheezing, [  ]  Yes  [ x ]  No                                      rales  [ x ]  Yes  [  ]  No ,rt base                                  rhonchi  [  ]  Yes  [x  ]  No                 Respirations labored  [  ]  Yes  [x  ]  No    HEART: Regular rate and rhythm  [x  ]  Yes  [  ]  No                                        Murmur [  ]  Yes  [ x ]  No                                              rub  [  ]  Yes  [x  ]  No                                          gallop  [  ]  Yes  [x  ]  No    ABDOMEN:                                 Soft, [x  ]  Yes  [  ]  No                                             nontender [x  ]  Yes  [  ]  No                                             distended.[  ]  Yes  [ x ]  No                            hepatosplenomegaly ,[  ]  Yes  [ x ]  No                                                    BS   [ x ]  Yes  [  ]  No    EXTREMITIES:                cyanosis, [  ]  Yes  [ x ]  No                                       clubbing,   [  ]  Yes  [x  ]  No                                               rash, [  ]  Yes  x]  No                                           edema.  [x  ]  Yes  [  ]  No  1+ leg edema  NEUROLOGIC: Alert and Oriented  [x  ] Person [ x ] Time  [x ] Place                                    Cranial nerves intact    [x  ]  Yes  [  ]  No                                               sensory Intact  [x  ]  Yes  [  ]  No                                                  motor WNL   x[  ]  Yes  [  ]  No                                                Jose Paresis  [  ]  Yes  [ x ]  N      LABS:                        11.2   8.1   )-----------( 228      ( 01 Aug 2017 06:26 )             35.7     08-    139  |  100  |  16  ----------------------------<  124<H>  2.9<LL>   |  30  |  1.21    Ca    8.0<L>      01 Aug 2017 06:26  Phos  2.3       Mg     1.7         TPro  6.3  /  Alb  3.0<L>  /  TBili  1.5<H>  /  DBili  x   /  AST  37  /  ALT  38  /  AlkPhos  67  08-    PT/INR - ( 01 Aug 2017 06:26 )   PT: 26.6 sec;   INR: 2.40 ratio         PTT - ( 01 Aug 2017 06:26 )  PTT:32.3 sec  Urinalysis Basic - ( 2017 01:54 )    Color: Yellow / Appearance: Clear / S.015 / pH: x  Gluc: x / Ketone: Negative  / Bili: Negative / Urobili: Negative   Blood: x / Protein: 30 mg/dL / Nitrite: Negative   Leuk Esterase: Negative / RBC: 0-2 /HPF / WBC 0-2 /HPF   Sq Epi: x / Non Sq Epi: Negative / Bacteria: Negative /HPF        CARDIAC MARKERS ( 2017 01:54 )  0.051 ng/mL / x     / x     / x     / x      CARDIAC MARKERS ( 2017 19:32 )  0.061 ng/mL / x     / x     / x     / x      CARDIAC MARKERS ( 2017 13:50 )  0.052 ng/mL / x     / 121 U/L / x     / x            Serum Pro-Brain Natriuretic Peptide: 5124 pg/mL (17 @ 13:50)          MICROBIOLOGY:    RADIOLOGY & ADDITIONAL STUDIES:    CXR:    Ct scan chest:    ekg;    echo:

## 2017-08-01 NOTE — PROGRESS NOTE ADULT - SUBJECTIVE AND OBJECTIVE BOX
PGY 1 Note discussed with supervising resident and primary attending    Patient is a 76y old  Female who presents with a chief complaint of Palpitations, dyspnea and bilateral leg edema (2017 11:49)      INTERVAL HPI/OVERNIGHT EVENTS: offers no new complaints; current symptoms resolving    MEDICATIONS  (STANDING):  lisinopril 5 milliGRAM(s) Oral daily  aspirin  chewable 81 milliGRAM(s) Oral daily  digoxin     Tablet 0.125 milliGRAM(s) Oral daily  atorvastatin 40 milliGRAM(s) Oral at bedtime  metoprolol 25 milliGRAM(s) Oral two times a day  furosemide   Injectable 40 milliGRAM(s) IV Push daily  insulin lispro (HumaLOG) corrective regimen sliding scale   SubCutaneous three times a day before meals  potassium chloride    Tablet ER 20 milliEquivalent(s) Oral every 2 hours    MEDICATIONS  (PRN):  simethicone 80 milliGRAM(s) Chew every 6 hours PRN Gas  acetaminophen   Tablet. 500 milliGRAM(s) Oral every 6 hours PRN Moderate Pain (4 - 6)      __________________________________________________  REVIEW OF SYSTEMS:    CONSTITUTIONAL: No fever,   EYES: no acute visual disturbances  NECK: No pain or stiffness  RESPIRATORY: No cough; No shortness of breath  CARDIOVASCULAR: No chest pain, no palpitations  GASTROINTESTINAL: No pain. No nausea or vomiting; No diarrhea   NEUROLOGICAL: No headache or numbness, no tremors  MUSCULOSKELETAL: No joint pain, no muscle pain  GENITOURINARY: no dysuria, no frequency, no hesitancy  PSYCHIATRY: no depression , no anxiety  ALL OTHER  ROS negative        Vital Signs Last 24 Hrs  T(C): 36.7 (01 Aug 2017 14:23), Max: 37.2 (2017 20:00)  T(F): 98 (01 Aug 2017 14:23), Max: 98.9 (2017 20:00)  HR: 91 (01 Aug 2017 14:23) (83 - 100)  BP: 120/86 (01 Aug 2017 14:23) (120/86 - 157/105)  BP(mean): --  RR: 16 (01 Aug 2017 14:23) (16 - 17)  SpO2: 94% (01 Aug 2017 14:23) (94% - 100%)    ________________________________________________  PHYSICAL EXAM:  GENERAL: NAD  HEENT: Normocephalic;  conjunctivae and sclerae clear; moist mucous membranes;   NECK : supple  CHEST/LUNG: Clear to auscultation bilaterally with good air entry   HEART: S1 S2  regular; no murmurs, gallops or rubs  ABDOMEN: Soft, Nontender, Nondistended; Bowel sounds present  EXTREMITIES: no cyanosis; no edema; no calf tenderness  SKIN: warm and dry; no rash  NERVOUS SYSTEM:  Awake and alert; Oriented  to place, person and time ; no new deficits    _________________________________________________  LABS:                        11.2   8.1   )-----------( 228      ( 01 Aug 2017 06:26 )             35.7     08    139  |  100  |  16  ----------------------------<  124<H>  2.9<LL>   |  30  |  1.21    Ca    8.0<L>      01 Aug 2017 06:26  Phos  2.3       Mg     1.7         TPro  6.3  /  Alb  3.0<L>  /  TBili  1.5<H>  /  DBili  x   /  AST  37  /  ALT  38  /  AlkPhos  67  08    PT/INR - ( 01 Aug 2017 06:26 )   PT: 26.6 sec;   INR: 2.40 ratio         PTT - ( 01 Aug 2017 06:26 )  PTT:32.3 sec  Urinalysis Basic - ( 2017 01:54 )    Color: Yellow / Appearance: Clear / S.015 / pH: x  Gluc: x / Ketone: Negative  / Bili: Negative / Urobili: Negative   Blood: x / Protein: 30 mg/dL / Nitrite: Negative   Leuk Esterase: Negative / RBC: 0-2 /HPF / WBC 0-2 /HPF   Sq Epi: x / Non Sq Epi: Negative / Bacteria: Negative /HPF      CAPILLARY BLOOD GLUCOSE  142 (01 Aug 2017 16:20)  191 (01 Aug 2017 11:09)  143 (01 Aug 2017 07:46)  119 (2017 20:51)            RADIOLOGY & ADDITIONAL TESTS:    Imaging Personally Reviewed:  YES/NO    Consultant(s) Notes Reviewed:   YES/ No    Care Discussed with Consultants :     Plan of care was discussed with patient and /or primary care giver; all questions and concerns were addressed and care was aligned with patient's wishes.

## 2017-08-02 LAB
ANION GAP SERPL CALC-SCNC: 10 MMOL/L — SIGNIFICANT CHANGE UP (ref 5–17)
BUN SERPL-MCNC: 15 MG/DL — SIGNIFICANT CHANGE UP (ref 7–18)
CALCIUM SERPL-MCNC: 8.4 MG/DL — SIGNIFICANT CHANGE UP (ref 8.4–10.5)
CHLORIDE SERPL-SCNC: 103 MMOL/L — SIGNIFICANT CHANGE UP (ref 96–108)
CO2 SERPL-SCNC: 28 MMOL/L — SIGNIFICANT CHANGE UP (ref 22–31)
CREAT SERPL-MCNC: 1.29 MG/DL — SIGNIFICANT CHANGE UP (ref 0.5–1.3)
GLUCOSE SERPL-MCNC: 146 MG/DL — HIGH (ref 70–99)
HCT VFR BLD CALC: 37.8 % — SIGNIFICANT CHANGE UP (ref 34.5–45)
HGB BLD-MCNC: 11.5 G/DL — SIGNIFICANT CHANGE UP (ref 11.5–15.5)
INR BLD: 2.09 RATIO — HIGH (ref 0.88–1.16)
MAGNESIUM SERPL-MCNC: 2 MG/DL — SIGNIFICANT CHANGE UP (ref 1.6–2.6)
MCHC RBC-ENTMCNC: 21.4 PG — LOW (ref 27–34)
MCHC RBC-ENTMCNC: 30.4 GM/DL — LOW (ref 32–36)
MCV RBC AUTO: 70.4 FL — LOW (ref 80–100)
PHOSPHATE SERPL-MCNC: 2.2 MG/DL — LOW (ref 2.5–4.5)
PLATELET # BLD AUTO: 330 K/UL — SIGNIFICANT CHANGE UP (ref 150–400)
POTASSIUM SERPL-MCNC: 3.4 MMOL/L — LOW (ref 3.5–5.3)
POTASSIUM SERPL-SCNC: 3.4 MMOL/L — LOW (ref 3.5–5.3)
PROTHROM AB SERPL-ACNC: 23.1 SEC — HIGH (ref 9.8–12.7)
RBC # BLD: 5.37 M/UL — HIGH (ref 3.8–5.2)
RBC # FLD: 16.2 % — HIGH (ref 10.3–14.5)
SODIUM SERPL-SCNC: 141 MMOL/L — SIGNIFICANT CHANGE UP (ref 135–145)
WBC # BLD: 9 K/UL — SIGNIFICANT CHANGE UP (ref 3.8–10.5)
WBC # FLD AUTO: 9 K/UL — SIGNIFICANT CHANGE UP (ref 3.8–10.5)

## 2017-08-02 RX ORDER — POTASSIUM CHLORIDE 20 MEQ
20 PACKET (EA) ORAL
Qty: 0 | Refills: 0 | Status: COMPLETED | OUTPATIENT
Start: 2017-08-02 | End: 2017-08-02

## 2017-08-02 RX ORDER — ACETAMINOPHEN 500 MG
650 TABLET ORAL ONCE
Qty: 0 | Refills: 0 | Status: COMPLETED | OUTPATIENT
Start: 2017-08-02 | End: 2017-08-02

## 2017-08-02 RX ADMIN — Medication 1: at 12:20

## 2017-08-02 RX ADMIN — Medication 30 MILLILITER(S): at 22:38

## 2017-08-02 RX ADMIN — Medication 20 MILLIEQUIVALENT(S): at 12:20

## 2017-08-02 RX ADMIN — Medication 1: at 08:19

## 2017-08-02 RX ADMIN — Medication 25 MILLIGRAM(S): at 05:55

## 2017-08-02 RX ADMIN — Medication 40 MILLIGRAM(S): at 05:55

## 2017-08-02 RX ADMIN — ATORVASTATIN CALCIUM 40 MILLIGRAM(S): 80 TABLET, FILM COATED ORAL at 21:14

## 2017-08-02 RX ADMIN — Medication 0.12 MILLIGRAM(S): at 05:55

## 2017-08-02 RX ADMIN — Medication 20 MILLIEQUIVALENT(S): at 13:38

## 2017-08-02 RX ADMIN — Medication 81 MILLIGRAM(S): at 12:20

## 2017-08-02 RX ADMIN — Medication 20 MILLIEQUIVALENT(S): at 10:27

## 2017-08-02 RX ADMIN — Medication 25 MILLIGRAM(S): at 17:48

## 2017-08-02 RX ADMIN — LISINOPRIL 5 MILLIGRAM(S): 2.5 TABLET ORAL at 05:55

## 2017-08-02 NOTE — PROGRESS NOTE ADULT - PROBLEM SELECTOR PLAN 2
Off Coumadin due to transfer to [facility] for Cardiac Catheterization.  INR this morning 2.40. Will monitor INR in am tomorrow

## 2017-08-02 NOTE — PROGRESS NOTE ADULT - SUBJECTIVE AND OBJECTIVE BOX
PGY 1 Note discussed with supervising resident and primary attending    Patient is a 76y old  Female who presents with a chief complaint of Palpitations, dyspnea and bilateral leg edema (31 Jul 2017 11:49)      INTERVAL HPI/OVERNIGHT EVENTS: offers no new complaints; current symptoms resolving    MEDICATIONS  (STANDING):  lisinopril 5 milliGRAM(s) Oral daily  aspirin  chewable 81 milliGRAM(s) Oral daily  digoxin     Tablet 0.125 milliGRAM(s) Oral daily  atorvastatin 40 milliGRAM(s) Oral at bedtime  metoprolol 25 milliGRAM(s) Oral two times a day  furosemide   Injectable 40 milliGRAM(s) IV Push daily  insulin lispro (HumaLOG) corrective regimen sliding scale   SubCutaneous three times a day before meals  aluminum hydroxide/magnesium hydroxide/simethicone Suspension 30 milliLiter(s) Oral once  potassium chloride    Tablet ER 20 milliEquivalent(s) Oral every 2 hours    MEDICATIONS  (PRN):  simethicone 80 milliGRAM(s) Chew every 6 hours PRN Gas  acetaminophen   Tablet. 500 milliGRAM(s) Oral every 6 hours PRN Moderate Pain (4 - 6)      __________________________________________________  REVIEW OF SYSTEMS:    CONSTITUTIONAL: No fever,   EYES: no acute visual disturbances  NECK: No pain or stiffness  RESPIRATORY: No cough; No shortness of breath  CARDIOVASCULAR: No chest pain, no palpitations  GASTROINTESTINAL: No pain. No nausea or vomiting; No diarrhea   NEUROLOGICAL: No headache or numbness, no tremors  MUSCULOSKELETAL: No joint pain, no muscle pain.   GENITOURINARY: no dysuria, no frequency, no hesitancy  PSYCHIATRY: no depression , no anxiety      Vital Signs Last 24 Hrs  T(C): 37 (02 Aug 2017 05:55), Max: 37 (02 Aug 2017 05:55)  T(F): 98.6 (02 Aug 2017 05:55), Max: 98.6 (02 Aug 2017 05:55)  HR: 95 (02 Aug 2017 05:55) (80 - 95)  BP: 147/90 (02 Aug 2017 05:55) (120/86 - 147/90)  BP(mean): --  RR: 18 (02 Aug 2017 05:55) (16 - 18)  SpO2: 97% (02 Aug 2017 05:55) (94% - 100%)    ________________________________________________  PHYSICAL EXAM:  GENERAL: NAD  HEENT: Normocephalic;  conjunctivae and sclerae clear; moist mucous membranes;   NECK : supple  CHEST/LUNG: Clear to auscultation bilaterally with good air entry   HEART: S1 S2  regular; no murmurs, gallops or rubs  ABDOMEN: Soft, Nontender, Nondistended; Bowel sounds present  EXTREMITIES: Decreased lower extremity edema  SKIN: warm and dry; no rash  NERVOUS SYSTEM:  Awake and alert; Oriented  to place, person and time ; no new deficits    _________________________________________________  LABS:                        11.5   9.0   )-----------( 330      ( 02 Aug 2017 06:44 )             37.8     08-02    141  |  103  |  15  ----------------------------<  146<H>  3.4<L>   |  28  |  1.29    Ca    8.4      02 Aug 2017 06:44  Phos  2.2     08-02  Mg     2.0     08-02    TPro  6.3  /  Alb  3.0<L>  /  TBili  1.5<H>  /  DBili  x   /  AST  37  /  ALT  38  /  AlkPhos  67  08-01    PT/INR - ( 02 Aug 2017 06:44 )   PT: 23.1 sec;   INR: 2.09 ratio         PTT - ( 01 Aug 2017 06:26 )  PTT:32.3 sec    CAPILLARY BLOOD GLUCOSE  175 (02 Aug 2017 08:09)  125 (01 Aug 2017 21:13)  142 (01 Aug 2017 16:20)            RADIOLOGY & ADDITIONAL TESTS:    Imaging Personally Reviewed:  YES/NO    Consultant(s) Notes Reviewed:   YES/ No    Care Discussed with Consultants :     Plan of care was discussed with patient and /or primary care giver; all questions and concerns were addressed and care was aligned with patient's wishes.

## 2017-08-02 NOTE — PROGRESS NOTE ADULT - SUBJECTIVE AND OBJECTIVE BOX
PGY 1 Note discussed with supervising resident and primary attending    Patient is a 76y old  Female who presents with a chief complaint of Palpitations, dyspnea and bilateral leg edema (2017 11:49)      INTERVAL HPI/OVERNIGHT EVENTS: offers no new complaints; current symptoms resolving    MEDICATIONS  (STANDING):  lisinopril 5 milliGRAM(s) Oral daily  aspirin  chewable 81 milliGRAM(s) Oral daily  digoxin     Tablet 0.125 milliGRAM(s) Oral daily  atorvastatin 40 milliGRAM(s) Oral at bedtime  metoprolol 25 milliGRAM(s) Oral two times a day  furosemide   Injectable 40 milliGRAM(s) IV Push daily  insulin lispro (HumaLOG) corrective regimen sliding scale   SubCutaneous three times a day before meals  potassium chloride    Tablet ER 20 milliEquivalent(s) Oral every 2 hours    MEDICATIONS  (PRN):  simethicone 80 milliGRAM(s) Chew every 6 hours PRN Gas  acetaminophen   Tablet. 500 milliGRAM(s) Oral every 6 hours PRN Moderate Pain (4 - 6)      __________________________________________________  REVIEW OF SYSTEMS:    CONSTITUTIONAL: No fever,   EYES: no acute visual disturbances  NECK: No pain or stiffness  RESPIRATORY: No cough; No shortness of breath  CARDIOVASCULAR: No chest pain, no palpitations  GASTROINTESTINAL: No pain. No nausea or vomiting; No diarrhea   NEUROLOGICAL: No headache or numbness, no tremors  MUSCULOSKELETAL: No joint pain, no muscle pain  GENITOURINARY: no dysuria, no frequency, no hesitancy  PSYCHIATRY: no depression , no anxiety  ALL OTHER  ROS negative      .vs stable afebrile    ________________________________________________  PHYSICAL EXAM:  GENERAL: NAD  HEENT: Normocephalic;  conjunctivae and sclerae clear; moist mucous membranes;   NECK : supple  CHEST/LUNG: Clear to auscultation bilaterally with good air entry   HEART: S1 S2  regular; no murmurs, gallops or rubs  ABDOMEN: Soft, Nontender, Nondistended; Bowel sounds present  EXTREMITIES: no cyanosis; no edema; no calf tenderness  SKIN: warm and dry; no rash  NERVOUS SYSTEM:  Awake and alert; Oriented  to place, person and time ; no new deficits    _________________________________________________  LABS:    LABS:                        11.5   9.0   )-----------( 330      ( 02 Aug 2017 06:44 )             37.8     08-    141  |  103  |  15  ----------------------------<  146<H>  3.4<L>   |  28  |  1.29    Ca    8.4      02 Aug 2017 06:44  Phos  2.2     08-02  Mg     2.0     08-    TPro  6.3  /  Alb  3.0<L>  /  TBili  1.5<H>  /  DBili  x   /  AST  37  /  ALT  38  /  AlkPhos  67      PT/INR - ( 02 Aug 2017 06:44 )   PT: 23.1 sec;   INR: 2.09 ratio         PTT - ( 01 Aug 2017 06:26 )  PTT:32.3 sec            Serum Pro-Brain Natriuretic Peptide: 5124 pg/mL (17 @ 13:50)                              11.2   8.1   )-----------( 228      ( 01 Aug 2017 06:26 )             35.7     08-01    139  |  100  |  16  ----------------------------<  124<H>  2.9<LL>   |  30  |  1.21    Ca    8.0<L>      01 Aug 2017 06:26  Phos  2.3     08-01  Mg     1.7     08-01    TPro  6.3  /  Alb  3.0<L>  /  TBili  1.5<H>  /  DBili  x   /  AST  37  /  ALT  38  /  AlkPhos  67      PT/INR - ( 01 Aug 2017 06:26 )   PT: 26.6 sec;   INR: 2.40 ratio         PTT - ( 01 Aug 2017 06:26 )  PTT:32.3 sec  Urinalysis Basic - ( 2017 01:54 )    Color: Yellow / Appearance: Clear / S.015 / pH: x  Gluc: x / Ketone: Negative  / Bili: Negative / Urobili: Negative   Blood: x / Protein: 30 mg/dL / Nitrite: Negative   Leuk Esterase: Negative / RBC: 0-2 /HPF / WBC 0-2 /HPF   Sq Epi: x / Non Sq Epi: Negative / Bacteria: Negative /HPF      CAPILLARY BLOOD GLUCOSE  142 (01 Aug 2017 16:20)  191 (01 Aug 2017 11:09)  143 (01 Aug 2017 07:46)  119 (2017 20:51)            RADIOLOGY & ADDITIONAL TESTS:    Imaging Personally Reviewed:  YES/NO    Consultant(s) Notes Reviewed:   YES/ No    Care Discussed with Consultants :     Plan of care was discussed with patient and /or primary care giver; all questions and concerns were addressed and care was aligned with patient's wishes.

## 2017-08-02 NOTE — PROGRESS NOTE ADULT - ASSESSMENT
Jazmín Patterson 75 yo female w/ PMHx CHF, Afib on Coumadin, DMT2, vertigo. Comes to the ED c/o palpitations that started in the morning after walking to the bathroom, accompanied by dyspnea on exertion. Patient was admitted on June 19 for bronchitis and CHF exacerbation. Patient also refers bilateral leg edema and dry cough. Also refers orthopnea and paroxysmal nocturnal dyspnea. Sleeps with 3 pillows. Admitted to Medicine Floor for CHF exacerbation.

## 2017-08-02 NOTE — PROVIDER CONTACT NOTE (OTHER) - BACKGROUND
/96 
Pt admitted with Afib and CHF. Pt for cardiac cath at [facility] when INR is therapeutic.
139/95
bp 147/90

## 2017-08-03 ENCOUNTER — INPATIENT (INPATIENT)
Facility: HOSPITAL | Age: 77
LOS: 6 days | Discharge: HOME | End: 2017-08-10
Attending: INTERNAL MEDICINE | Admitting: INTERNAL MEDICINE
Payer: MEDICARE

## 2017-08-03 VITALS
OXYGEN SATURATION: 100 % | DIASTOLIC BLOOD PRESSURE: 65 MMHG | HEIGHT: 65 IN | HEART RATE: 102 BPM | SYSTOLIC BLOOD PRESSURE: 149 MMHG | TEMPERATURE: 100 F | RESPIRATION RATE: 16 BRPM | WEIGHT: 174.17 LBS

## 2017-08-03 VITALS
DIASTOLIC BLOOD PRESSURE: 90 MMHG | SYSTOLIC BLOOD PRESSURE: 128 MMHG | TEMPERATURE: 99 F | HEART RATE: 102 BPM | OXYGEN SATURATION: 100 % | RESPIRATION RATE: 14 BRPM

## 2017-08-03 DIAGNOSIS — I50.9 HEART FAILURE, UNSPECIFIED: ICD-10-CM

## 2017-08-03 LAB
ANION GAP SERPL CALC-SCNC: 5 MMOL/L — SIGNIFICANT CHANGE UP (ref 5–17)
APTT BLD: 30.2 SEC — SIGNIFICANT CHANGE UP (ref 27.5–37.4)
BUN SERPL-MCNC: 14 MG/DL — SIGNIFICANT CHANGE UP (ref 7–18)
CALCIUM SERPL-MCNC: 8.5 MG/DL — SIGNIFICANT CHANGE UP (ref 8.4–10.5)
CHLORIDE SERPL-SCNC: 105 MMOL/L — SIGNIFICANT CHANGE UP (ref 96–108)
CO2 SERPL-SCNC: 29 MMOL/L — SIGNIFICANT CHANGE UP (ref 22–31)
CREAT SERPL-MCNC: 1.14 MG/DL — SIGNIFICANT CHANGE UP (ref 0.5–1.3)
GLUCOSE SERPL-MCNC: 133 MG/DL — HIGH (ref 70–99)
HCT VFR BLD CALC: 37.7 % — SIGNIFICANT CHANGE UP (ref 34.5–45)
HGB BLD-MCNC: 11.1 G/DL — LOW (ref 11.5–15.5)
INR BLD: 1.38 — HIGH (ref 0.88–1.17)
INR BLD: 1.57 RATIO — HIGH (ref 0.88–1.16)
MAGNESIUM SERPL-MCNC: 2 MG/DL — SIGNIFICANT CHANGE UP (ref 1.6–2.6)
MCHC RBC-ENTMCNC: 20.8 PG — LOW (ref 27–34)
MCHC RBC-ENTMCNC: 29.5 GM/DL — LOW (ref 32–36)
MCV RBC AUTO: 70.3 FL — LOW (ref 80–100)
PHOSPHATE SERPL-MCNC: 2.3 MG/DL — LOW (ref 2.5–4.5)
PLATELET # BLD AUTO: 281 K/UL — SIGNIFICANT CHANGE UP (ref 150–400)
POTASSIUM SERPL-MCNC: 3.8 MMOL/L — SIGNIFICANT CHANGE UP (ref 3.5–5.3)
POTASSIUM SERPL-SCNC: 3.8 MMOL/L — SIGNIFICANT CHANGE UP (ref 3.5–5.3)
PROTHROM AB SERPL-ACNC: 15.6 SEC — HIGH (ref 9.8–13.1)
PROTHROM AB SERPL-ACNC: 17.3 SEC — HIGH (ref 9.8–12.7)
RBC # BLD: 5.36 M/UL — HIGH (ref 3.8–5.2)
RBC # FLD: 16.4 % — HIGH (ref 10.3–14.5)
SODIUM SERPL-SCNC: 139 MMOL/L — SIGNIFICANT CHANGE UP (ref 135–145)
WBC # BLD: 9.1 K/UL — SIGNIFICANT CHANGE UP (ref 3.8–10.5)
WBC # FLD AUTO: 9.1 K/UL — SIGNIFICANT CHANGE UP (ref 3.8–10.5)

## 2017-08-03 PROCEDURE — 84484 ASSAY OF TROPONIN QUANT: CPT

## 2017-08-03 PROCEDURE — 80162 ASSAY OF DIGOXIN TOTAL: CPT

## 2017-08-03 PROCEDURE — 83550 IRON BINDING TEST: CPT

## 2017-08-03 PROCEDURE — 82728 ASSAY OF FERRITIN: CPT

## 2017-08-03 PROCEDURE — 71045 X-RAY EXAM CHEST 1 VIEW: CPT

## 2017-08-03 PROCEDURE — 85027 COMPLETE CBC AUTOMATED: CPT

## 2017-08-03 PROCEDURE — 93458 L HRT ARTERY/VENTRICLE ANGIO: CPT | Mod: 26

## 2017-08-03 PROCEDURE — 82607 VITAMIN B-12: CPT

## 2017-08-03 PROCEDURE — 84100 ASSAY OF PHOSPHORUS: CPT

## 2017-08-03 PROCEDURE — 83880 ASSAY OF NATRIURETIC PEPTIDE: CPT

## 2017-08-03 PROCEDURE — 93010 ELECTROCARDIOGRAM REPORT: CPT

## 2017-08-03 PROCEDURE — 36415 COLL VENOUS BLD VENIPUNCTURE: CPT

## 2017-08-03 PROCEDURE — 84443 ASSAY THYROID STIM HORMONE: CPT

## 2017-08-03 PROCEDURE — 85610 PROTHROMBIN TIME: CPT

## 2017-08-03 PROCEDURE — 93970 EXTREMITY STUDY: CPT

## 2017-08-03 PROCEDURE — 80048 BASIC METABOLIC PNL TOTAL CA: CPT

## 2017-08-03 PROCEDURE — 83735 ASSAY OF MAGNESIUM: CPT

## 2017-08-03 PROCEDURE — 82306 VITAMIN D 25 HYDROXY: CPT

## 2017-08-03 PROCEDURE — 93005 ELECTROCARDIOGRAM TRACING: CPT

## 2017-08-03 PROCEDURE — 80076 HEPATIC FUNCTION PANEL: CPT

## 2017-08-03 PROCEDURE — 96374 THER/PROPH/DIAG INJ IV PUSH: CPT

## 2017-08-03 PROCEDURE — 82550 ASSAY OF CK (CPK): CPT

## 2017-08-03 PROCEDURE — 80053 COMPREHEN METABOLIC PANEL: CPT

## 2017-08-03 PROCEDURE — 85730 THROMBOPLASTIN TIME PARTIAL: CPT

## 2017-08-03 PROCEDURE — 83036 HEMOGLOBIN GLYCOSYLATED A1C: CPT

## 2017-08-03 PROCEDURE — 99285 EMERGENCY DEPT VISIT HI MDM: CPT | Mod: 25

## 2017-08-03 PROCEDURE — 81001 URINALYSIS AUTO W/SCOPE: CPT

## 2017-08-03 RX ORDER — FUROSEMIDE 40 MG
40 TABLET ORAL DAILY
Qty: 0 | Refills: 0 | Status: DISCONTINUED | OUTPATIENT
Start: 2017-08-03 | End: 2017-08-06

## 2017-08-03 RX ORDER — METOPROLOL TARTRATE 50 MG
25 TABLET ORAL
Qty: 0 | Refills: 0 | Status: DISCONTINUED | OUTPATIENT
Start: 2017-08-03 | End: 2017-08-03

## 2017-08-03 RX ORDER — SODIUM CHLORIDE 9 MG/ML
1000 INJECTION, SOLUTION INTRAVENOUS
Qty: 0 | Refills: 0 | Status: DISCONTINUED | OUTPATIENT
Start: 2017-08-03 | End: 2017-08-10

## 2017-08-03 RX ORDER — GLUCAGON INJECTION, SOLUTION 0.5 MG/.1ML
1 INJECTION, SOLUTION SUBCUTANEOUS ONCE
Qty: 0 | Refills: 0 | Status: DISCONTINUED | OUTPATIENT
Start: 2017-08-03 | End: 2017-08-10

## 2017-08-03 RX ORDER — ATORVASTATIN CALCIUM 80 MG/1
40 TABLET, FILM COATED ORAL AT BEDTIME
Qty: 0 | Refills: 0 | Status: DISCONTINUED | OUTPATIENT
Start: 2017-08-03 | End: 2017-08-10

## 2017-08-03 RX ORDER — HEPARIN SODIUM 5000 [USP'U]/ML
6500 INJECTION INTRAVENOUS; SUBCUTANEOUS EVERY 6 HOURS
Qty: 0 | Refills: 0 | Status: DISCONTINUED | OUTPATIENT
Start: 2017-08-03 | End: 2017-08-09

## 2017-08-03 RX ORDER — DIGOXIN 250 MCG
0.12 TABLET ORAL DAILY
Qty: 0 | Refills: 0 | Status: DISCONTINUED | OUTPATIENT
Start: 2017-08-03 | End: 2017-08-10

## 2017-08-03 RX ORDER — INSULIN LISPRO 100/ML
VIAL (ML) SUBCUTANEOUS
Qty: 0 | Refills: 0 | Status: DISCONTINUED | OUTPATIENT
Start: 2017-08-03 | End: 2017-08-10

## 2017-08-03 RX ORDER — METOPROLOL TARTRATE 50 MG
25 TABLET ORAL
Qty: 0 | Refills: 0 | Status: DISCONTINUED | OUTPATIENT
Start: 2017-08-03 | End: 2017-08-05

## 2017-08-03 RX ORDER — DEXTROSE 50 % IN WATER 50 %
25 SYRINGE (ML) INTRAVENOUS ONCE
Qty: 0 | Refills: 0 | Status: DISCONTINUED | OUTPATIENT
Start: 2017-08-03 | End: 2017-08-10

## 2017-08-03 RX ORDER — DEXTROSE 50 % IN WATER 50 %
1 SYRINGE (ML) INTRAVENOUS ONCE
Qty: 0 | Refills: 0 | Status: DISCONTINUED | OUTPATIENT
Start: 2017-08-03 | End: 2017-08-10

## 2017-08-03 RX ORDER — HEPARIN SODIUM 5000 [USP'U]/ML
INJECTION INTRAVENOUS; SUBCUTANEOUS
Qty: 25000 | Refills: 0 | Status: DISCONTINUED | OUTPATIENT
Start: 2017-08-03 | End: 2017-08-09

## 2017-08-03 RX ORDER — DEXTROSE 50 % IN WATER 50 %
12.5 SYRINGE (ML) INTRAVENOUS ONCE
Qty: 0 | Refills: 0 | Status: DISCONTINUED | OUTPATIENT
Start: 2017-08-03 | End: 2017-08-10

## 2017-08-03 RX ORDER — MECLIZINE HCL 12.5 MG
25 TABLET ORAL THREE TIMES A DAY
Qty: 0 | Refills: 0 | Status: DISCONTINUED | OUTPATIENT
Start: 2017-08-03 | End: 2017-08-10

## 2017-08-03 RX ORDER — WARFARIN SODIUM 2.5 MG/1
2.5 TABLET ORAL ONCE
Qty: 0 | Refills: 0 | Status: COMPLETED | OUTPATIENT
Start: 2017-08-03 | End: 2017-08-03

## 2017-08-03 RX ORDER — SIMETHICONE 80 MG/1
80 TABLET, CHEWABLE ORAL EVERY 6 HOURS
Qty: 0 | Refills: 0 | Status: DISCONTINUED | OUTPATIENT
Start: 2017-08-03 | End: 2017-08-10

## 2017-08-03 RX ORDER — HEPARIN SODIUM 5000 [USP'U]/ML
3000 INJECTION INTRAVENOUS; SUBCUTANEOUS EVERY 6 HOURS
Qty: 0 | Refills: 0 | Status: DISCONTINUED | OUTPATIENT
Start: 2017-08-03 | End: 2017-08-09

## 2017-08-03 RX ORDER — ASPIRIN/CALCIUM CARB/MAGNESIUM 324 MG
81 TABLET ORAL DAILY
Qty: 0 | Refills: 0 | Status: DISCONTINUED | OUTPATIENT
Start: 2017-08-03 | End: 2017-08-10

## 2017-08-03 RX ORDER — LISINOPRIL 2.5 MG/1
5 TABLET ORAL DAILY
Qty: 0 | Refills: 0 | Status: DISCONTINUED | OUTPATIENT
Start: 2017-08-03 | End: 2017-08-10

## 2017-08-03 RX ADMIN — Medication 25 MILLIGRAM(S): at 22:17

## 2017-08-03 RX ADMIN — HEPARIN SODIUM 1400 UNIT(S)/HR: 5000 INJECTION INTRAVENOUS; SUBCUTANEOUS at 22:04

## 2017-08-03 RX ADMIN — Medication 25 MILLIGRAM(S): at 06:03

## 2017-08-03 RX ADMIN — Medication 25 MILLIGRAM(S): at 22:05

## 2017-08-03 RX ADMIN — ATORVASTATIN CALCIUM 40 MILLIGRAM(S): 80 TABLET, FILM COATED ORAL at 22:05

## 2017-08-03 RX ADMIN — Medication 0.12 MILLIGRAM(S): at 06:03

## 2017-08-03 RX ADMIN — Medication 40 MILLIGRAM(S): at 06:03

## 2017-08-03 RX ADMIN — WARFARIN SODIUM 2.5 MILLIGRAM(S): 2.5 TABLET ORAL at 18:18

## 2017-08-03 RX ADMIN — LISINOPRIL 5 MILLIGRAM(S): 2.5 TABLET ORAL at 06:03

## 2017-08-03 NOTE — PROGRESS NOTE ADULT - PROBLEM SELECTOR PLAN 1
if lasix
Clinically Improving.   Will continue with Lasix 40 mg IV   Daily weight  Strict lnput/output
if lasix
iv lasix  r/o cad
Clinically Improving.   Will continue with Lasix 40 mg IV   Daily weight  Strict lnput/output
Will continue with Lasix 40 mg IV   Daily weight  Strict lnput/output

## 2017-08-03 NOTE — PROGRESS NOTE ADULT - PROBLEM SELECTOR PROBLEM 1
CHF exacerbation
CHF (congestive heart failure)
CHF exacerbation
CHF exacerbation
CHF (congestive heart failure)
CHF exacerbation

## 2017-08-03 NOTE — CHART NOTE - NSCHARTNOTEFT_GEN_A_CORE
76 year old female had cardiac cath today. Pt reports feeling well. No CP, no SOB. no dizziness. Denies numbness and tingling to arm.  RRB removed prior by PA in cath lab. PE: bandage clean and dry, capillary refill <2 seconds. Bandage intact. Advised by Dr Rosario to start Heparin on pt. Pt Coumadin was stopped prior to procedure. Will monitor pt throughout the night. Heparin restarted

## 2017-08-03 NOTE — PROGRESS NOTE ADULT - PROBLEM SELECTOR PLAN 5
riss  diet
Morning Potassium at 2.9  Replaced Potassium both IV and PO  Check BMP in am
rate controlled on dig  warfarin motor inr
riss  diet
Potassium 3.4 in AM today replaced 20meq x3 doses. Monitor
Morning Potassium at 2.9  Replaced Potassium both IV and PO  Check BMP in am

## 2017-08-03 NOTE — PROGRESS NOTE ADULT - PROBLEM SELECTOR PLAN 4
monitor bun/cr
Continue Humalog Sliding Scale
monitor bun/cr
monitor cbc
Continue Insulin regimen
Continue Humalog Sliding Scale

## 2017-08-03 NOTE — PROGRESS NOTE ADULT - SUBJECTIVE AND OBJECTIVE BOX
CARINA LUCY  [id number]  late entry  Patient is a 76y old  Female who presents with a chief complaint of Palpitations, dyspnea and bilateral leg edema (2017 11:49)    patient seen and examined   s doing better , no sob today, going to [facility]    MEDICATIONS  (STANDING):  lisinopril 5 milliGRAM(s) Oral daily  aspirin  chewable 81 milliGRAM(s) Oral daily  digoxin     Tablet 0.125 milliGRAM(s) Oral daily  meclizine 25 milliGRAM(s) Oral three times a day  atorvastatin 40 milliGRAM(s) Oral at bedtime  metoprolol 25 milliGRAM(s) Oral two times a day  furosemide   Injectable 40 milliGRAM(s) IV Push daily  insulin lispro (HumaLOG) corrective regimen sliding scale   SubCutaneous three times a day before meals  potassium chloride    Tablet ER 40 milliEquivalent(s) Oral once      MEDICATIONS  (PRN):  simethicone 80 milliGRAM(s) Chew every 6 hours PRN Gas  acetaminophen   Tablet. 500 milliGRAM(s) Oral every 6 hours PRN Moderate Pain (4 - 6)      Allergies    clavulanate (Unknown)  erythromycin (Hives; Rash)    Intolerances        PAST MEDICAL & SURGICAL HISTORY:  CHF (congestive heart failure)  Fluid retention  Irregular heart beat  Hypertension  Diabetes  No significant past surgical history      FAMILY HISTORY:  No pertinent family history in first degree relatives      SOCIAL HISTORY  Smoking History:     REVIEW OF SYSTEMS:    CONSTITUTIONAL:  Fevers [  ]  Yes  [ x ]  No                                   chills [  ]  Yes  [ x ]  No                               sweats  [  ]  Yes  [  x]  No                                fatigue [  x]  Yes  [  ]  No    HEENT:  Eyes:  Diplopia or blurred vision [  ]  Yes  [ x ]  No    ENT:                        earache  [  ]  Yes  [ x ]  No                             sore throat  [  ]  Yes  [x  ]  No                            runny nose.  [  ]  Yes  [x  ]  No    CARDIOVASCULAR:       chest pain  [  ]  Yes  [ x ]  No                        squeezing, tightness,  [  ]  Yes  [x  ]  No                                      palpitations.  [  ]  Yes  [x  ]  No    RESPIRATORY:             Cough [  ]  Yes  [ x ]  No                                  Wheezing [  ]  Yes  [x  ]  No                                Hemoptysis [  ]  Yes  [  x]  No                                      Sputum [  ]  Yes  [ x ]  No                   Shortness of Breathe [  ]  Yes  [ x ]  No    GASTROINTESTINAL:      Abdominal pain  [  ]  Yes  [x  ]  No                                                    Nausea  [  ]  Yes  [x  ]  No                                                   Vomiting [  ]  Yes  [x  ]  No                                              Constipation [  ]  Yes  [x  ]  No                                                    Diarrhea [  ]  Yes  [ x ]  No    GENITOURINARY:           Incontinence [  ]  Yes  [x  ]  No                                                Dysuria [  ]  Yes  [xx  ]  No                                      Blood in Urine  [  ]  Yes  [ x ]  No                          Frequency or urgency.  [  ]  Yes  [ x ]  No    NEUROLOGIC:                     Paresthesias  [  ]  Yes  [ x ]  No                                             fasciculations  [  ]  Yes  [x  ]  No                                seizures or weakness.  [  ]  Yes  [x  ]  No                                                   Headache [  ]  Yes  [ x ]  No                                  Loss of Conciousness [  ]  Yes  [x  ]  No                                                     Insomnia [  ]  Yes  [ x ]  No  x  PSYCHIATRIC:    Disorder of thought or mood.  [  ]  Yes  [ x ]  No                                                           Anxiety [  ]  Yes  [ x ]  No                                                      Depression [  ]  Yes  [x  ]  No                                                         Dementia [  ]  Yes  [ x ]  No  Vital Signs Last 24 Hrs  T(C): 37.1 (03 Aug 2017 09:23), Max: 37.3 (02 Aug 2017 21:28)  T(F): 98.7 (03 Aug 2017 09:23), Max: 99.1 (02 Aug 2017 21:28)  HR: 102 (03 Aug 2017 09:23) (56 - 140)  BP: 128/90 (03 Aug 2017 09:23) (122/88 - 146/61)  BP(mean): --  RR: 14 (03 Aug 2017 09:23) (14 - 18)  SpO2: 100% (03 Aug 2017 09:23) (100% - 100%)      PHYSICAL EXAMINATION:    GENERAL: The patient is a well-developed, well-nourished _____in no apparent distress.     HEENT: Head is normocephalic and atraumatic. Extraocular muscles are intact. Mucous membranes are moist.     NECK: Supple. jvd [  ]  Yes  [ x ]  No    LUNGS: Clear to auscultation  [  ]  Yes  [ x ]  No                               wheezing, [  ]  Yes  [ x ]  No                                      rales  [ x ]  Yes  [  ]  No ,rt base                                  rhonchi  [  ]  Yes  [x  ]  No                 Respirations labored  [  ]  Yes  [x  ]  No    HEART: Regular rate and rhythm  [x  ]  Yes  [  ]  No                                        Murmur [  ]  Yes  [ x ]  No                                              rub  [  ]  Yes  [x  ]  No                                          gallop  [  ]  Yes  [x  ]  No    ABDOMEN:                                 Soft, [x  ]  Yes  [  ]  No                                             nontender [x  ]  Yes  [  ]  No                                             distended.[  ]  Yes  [ x ]  No                            hepatosplenomegaly ,[  ]  Yes  [ x ]  No                                                    BS   [ x ]  Yes  [  ]  No    EXTREMITIES:                cyanosis, [  ]  Yes  [ x ]  No                                       clubbing,   [  ]  Yes  [x  ]  No                                               rash, [  ]  Yes  x]  No                                           edema.  [x  ]  Yes  [  ]  No  1+ leg edema  NEUROLOGIC: Alert and Oriented  [x  ] Person [ x ] Time  [x ] Place                                    Cranial nerves intact    [x  ]  Yes  [  ]  No                                               sensory Intact  [x  ]  Yes  [  ]  No                                                  motor WNL   x[  ]  Yes  [  ]  No                                                Jose Paresis  [  ]  Yes  [ x ]  N      LABS:  LABS:                        11.1   9.1   )-----------( 281      ( 03 Aug 2017 05:22 )             37.7     08-03    139  |  105  |  14  ----------------------------<  133<H>  3.8   |  29  |  1.14    Ca    8.5      03 Aug 2017 05:22  Phos  2.3     08-03  Mg     2.0     08-03      PT/INR - ( 03 Aug 2017 05:22 )   PT: 17.3 sec;   INR: 1.57 ratio                                                     11.2   8.1   )-----------( 228      ( 01 Aug 2017 06:26 )             35.7     08-01    139  |  100  |  16  ----------------------------<  124<H>  2.9<LL>   |  30  |  1.21    Ca    8.0<L>      01 Aug 2017 06:26  Phos  2.3     08-  Mg     1.7         TPro  6.3  /  Alb  3.0<L>  /  TBili  1.5<H>  /  DBili  x   /  AST  37  /  ALT  38  /  AlkPhos  67  08-    PT/INR - ( 01 Aug 2017 06:26 )   PT: 26.6 sec;   INR: 2.40 ratio         PTT - ( 01 Aug 2017 06:26 )  PTT:32.3 sec  Urinalysis Basic - ( 2017 01:54 )    Color: Yellow / Appearance: Clear / S.015 / pH: x  Gluc: x / Ketone: Negative  / Bili: Negative / Urobili: Negative   Blood: x / Protein: 30 mg/dL / Nitrite: Negative   Leuk Esterase: Negative / RBC: 0-2 /HPF / WBC 0-2 /HPF   Sq Epi: x / Non Sq Epi: Negative / Bacteria: Negative /HPF        CARDIAC MARKERS ( 2017 01:54 )  0.051 ng/mL / x     / x     / x     / x      CARDIAC MARKERS ( 2017 19:32 )  0.061 ng/mL / x     / x     / x     / x      CARDIAC MARKERS ( 2017 13:50 )  0.052 ng/mL / x     / 121 U/L / x     / x            Serum Pro-Brain Natriuretic Peptide: 5124 pg/mL (17 @ 13:50)          MICROBIOLOGY:    RADIOLOGY & ADDITIONAL STUDIES:    CXR:    Ct scan chest:    ekg;    echo:

## 2017-08-03 NOTE — PROGRESS NOTE ADULT - NSHPATTENDINGPLANDISCUSS_GEN_ALL_CORE
cardiology, resident and patient.
resident and patient and her family.
cardiology, resident and patient.
np and patient's daughter at bed side

## 2017-08-03 NOTE — PROGRESS NOTE ADULT - PROBLEM SELECTOR PROBLEM 3
Atrial fibrillation
Hypertension
Atrial fibrillation
Chronic kidney disease
Atrial fibrillation
Hypertension

## 2017-08-03 NOTE — TRANSFER ACCEPTANCE NOTE - HISTORY OF PRESENT ILLNESS
75 y/o female, PMHx HTN, CHF, DM, afib on coumadin, fluid retention on lasix, c/o palpitations x3 days and worsening bl leg swelling x2-3 weeks. Denies cp, , dyspnea, diaphoresis, hemoptysis, cough, fever/chills, hx dvt, calf pain or any other concerns. Admitted to [facility] with CHF exacerbation was diuresed with clinical improvement and transferred to [facility] for cardiac cath. Currently pt without SOB, chest pain, n/v/dizziness. 75 y/o female, PMHx HTN, CHF, DM, afib on coumadin, fluid retention on lasix, c/o palpitations x3 days and worsening bl leg swelling x2-3 weeks. Denies cp, , dyspnea, diaphoresis, hemoptysis, cough, fever/chills, hx dvt, calf pain or any other concerns. Admitted to [facility] with CHF exacerbation was diuresed with clinical improvement and transferred to [facility] for cardiac cath. Currently pt without SOB, chest pain, n/v/dizziness.     Home meds:  Lisinopril 5 mg QD  Meclizine 25 mg TID prn  Atorvastatin 40 mg QHS  ASA81 mg QD  Metoprolol 25 mg BID  Digoxin 125 mg QD  Ferrous Sulfate 45 mg QD  SImethicone 80 mg TID prn  Coumadin 2.5 mg QD  Glipizide 10 mg QD  Metformin 1000 mg BID

## 2017-08-03 NOTE — PROGRESS NOTE ADULT - PROBLEM SELECTOR PLAN 6
lisinopril  metoprolol
Monitor BUN and creatinine
lisinopril  metoprolol
riss
Monitor BNP in am
Monitor BUN and creatinine

## 2017-08-03 NOTE — PROGRESS NOTE ADULT - PROBLEM SELECTOR PROBLEM 6
Hypertension
Chronic kidney disease
Diabetes
Hypertension
Chronic kidney disease
Chronic kidney disease

## 2017-08-03 NOTE — PROGRESS NOTE ADULT - PROBLEM SELECTOR PLAN 3
cumadin on hold for angiogram
Continue BP medications
cumadin on hold for angiogram
monitor bmp
Off Coumadin due to transfer to [facility] for Cardiac Catheterization.  INR this morning 2.09. Discussed with Dr. Plummer (Cardiologist) Will discuss as transfer for [facility] required INR of less that 2.  Will monitor INR Daily
Continue BP medications

## 2017-08-03 NOTE — PROGRESS NOTE ADULT - ATTENDING COMMENTS
over all improved, to go to [facility] today
if ok with cardiology then transfer patient to [facility] later today
over all uimproving   waiting to go to [facility]
r/o cad   discussed with np and cardiology  possible transfer to [facility] today for further tx

## 2017-08-03 NOTE — TRANSFER ACCEPTANCE NOTE - ASSESSMENT
77 y/o female, PMHx HTN, CHF, DM, afib on coumadin  tx from [facility] for cardiac cath 77 y/o female, PMHx HTN, CHF, DM, afib on coumadin a/w SOB, chest tightness and dizziness tx from [facility] for cardiac cath  Consent obtained

## 2017-08-04 ENCOUNTER — TRANSCRIPTION ENCOUNTER (OUTPATIENT)
Age: 77
End: 2017-08-04

## 2017-08-04 LAB
APTT BLD: 115.8 SEC — HIGH (ref 27.5–37.4)
APTT BLD: 72.7 SEC — HIGH (ref 27.5–37.4)
APTT BLD: 78.3 SEC — HIGH (ref 27.5–37.4)
BUN SERPL-MCNC: 18 MG/DL — SIGNIFICANT CHANGE UP (ref 7–23)
CALCIUM SERPL-MCNC: 9.2 MG/DL — SIGNIFICANT CHANGE UP (ref 8.4–10.5)
CHLORIDE SERPL-SCNC: 102 MMOL/L — SIGNIFICANT CHANGE UP (ref 98–107)
CHOLEST SERPL-MCNC: 107 MG/DL — LOW (ref 120–199)
CO2 SERPL-SCNC: 23 MMOL/L — SIGNIFICANT CHANGE UP (ref 22–31)
CREAT SERPL-MCNC: 1.16 MG/DL — SIGNIFICANT CHANGE UP (ref 0.5–1.3)
GLUCOSE SERPL-MCNC: 155 MG/DL — HIGH (ref 70–99)
HCT VFR BLD CALC: 35.8 % — SIGNIFICANT CHANGE UP (ref 34.5–45)
HDLC SERPL-MCNC: 40 MG/DL — LOW (ref 45–65)
HGB BLD-MCNC: 11.2 G/DL — LOW (ref 11.5–15.5)
INR BLD: 1.44 — HIGH (ref 0.88–1.17)
LIPID PNL WITH DIRECT LDL SERPL: 59 MG/DL — SIGNIFICANT CHANGE UP
MAGNESIUM SERPL-MCNC: 1.8 MG/DL — SIGNIFICANT CHANGE UP (ref 1.6–2.6)
MCHC RBC-ENTMCNC: 21 PG — LOW (ref 27–34)
MCHC RBC-ENTMCNC: 31.3 % — LOW (ref 32–36)
MCV RBC AUTO: 67 FL — LOW (ref 80–100)
NRBC # FLD: 0.03 — SIGNIFICANT CHANGE UP
PLATELET # BLD AUTO: 289 K/UL — SIGNIFICANT CHANGE UP (ref 150–400)
PMV BLD: 9.9 FL — SIGNIFICANT CHANGE UP (ref 7–13)
POTASSIUM SERPL-MCNC: 4.2 MMOL/L — SIGNIFICANT CHANGE UP (ref 3.5–5.3)
POTASSIUM SERPL-SCNC: 4.2 MMOL/L — SIGNIFICANT CHANGE UP (ref 3.5–5.3)
PROTHROM AB SERPL-ACNC: 16.3 SEC — HIGH (ref 9.8–13.1)
RBC # BLD: 5.34 M/UL — HIGH (ref 3.8–5.2)
RBC # FLD: 17.9 % — HIGH (ref 10.3–14.5)
SODIUM SERPL-SCNC: 141 MMOL/L — SIGNIFICANT CHANGE UP (ref 135–145)
TRIGL SERPL-MCNC: 60 MG/DL — SIGNIFICANT CHANGE UP (ref 10–149)
TSH SERPL-MCNC: 1.1 UIU/ML — SIGNIFICANT CHANGE UP (ref 0.27–4.2)
WBC # BLD: 8.83 K/UL — SIGNIFICANT CHANGE UP (ref 3.8–10.5)
WBC # FLD AUTO: 8.83 K/UL — SIGNIFICANT CHANGE UP (ref 3.8–10.5)

## 2017-08-04 PROCEDURE — 93312 ECHO TRANSESOPHAGEAL: CPT | Mod: 26

## 2017-08-04 PROCEDURE — 93325 DOPPLER ECHO COLOR FLOW MAPG: CPT | Mod: 26,GC

## 2017-08-04 PROCEDURE — 93320 DOPPLER ECHO COMPLETE: CPT | Mod: 26,GC

## 2017-08-04 RX ORDER — BENZOCAINE AND MENTHOL 5; 1 G/100ML; G/100ML
1 LIQUID ORAL THREE TIMES A DAY
Qty: 0 | Refills: 0 | Status: DISCONTINUED | OUTPATIENT
Start: 2017-08-04 | End: 2017-08-10

## 2017-08-04 RX ORDER — WARFARIN SODIUM 2.5 MG/1
5 TABLET ORAL ONCE
Qty: 0 | Refills: 0 | Status: COMPLETED | OUTPATIENT
Start: 2017-08-04 | End: 2017-08-04

## 2017-08-04 RX ADMIN — Medication 40 MILLIGRAM(S): at 05:28

## 2017-08-04 RX ADMIN — Medication 1: at 09:38

## 2017-08-04 RX ADMIN — Medication 25 MILLIGRAM(S): at 05:28

## 2017-08-04 RX ADMIN — Medication 25 MILLIGRAM(S): at 21:18

## 2017-08-04 RX ADMIN — HEPARIN SODIUM 1200 UNIT(S)/HR: 5000 INJECTION INTRAVENOUS; SUBCUTANEOUS at 21:17

## 2017-08-04 RX ADMIN — Medication 25 MILLIGRAM(S): at 14:08

## 2017-08-04 RX ADMIN — Medication 25 MILLIGRAM(S): at 17:36

## 2017-08-04 RX ADMIN — ATORVASTATIN CALCIUM 40 MILLIGRAM(S): 80 TABLET, FILM COATED ORAL at 21:18

## 2017-08-04 RX ADMIN — HEPARIN SODIUM 1200 UNIT(S)/HR: 5000 INJECTION INTRAVENOUS; SUBCUTANEOUS at 13:42

## 2017-08-04 RX ADMIN — Medication 0.12 MILLIGRAM(S): at 05:28

## 2017-08-04 RX ADMIN — Medication 81 MILLIGRAM(S): at 14:08

## 2017-08-04 RX ADMIN — HEPARIN SODIUM 1400 UNIT(S)/HR: 5000 INJECTION INTRAVENOUS; SUBCUTANEOUS at 04:31

## 2017-08-04 RX ADMIN — WARFARIN SODIUM 5 MILLIGRAM(S): 2.5 TABLET ORAL at 17:36

## 2017-08-04 RX ADMIN — LISINOPRIL 5 MILLIGRAM(S): 2.5 TABLET ORAL at 05:28

## 2017-08-04 NOTE — DISCHARGE NOTE ADULT - ADDITIONAL INSTRUCTIONS
follow up with your Cardiologist in 1-2 weeks, call for an appointment   have your INR checked on Monday  have your blood checked in 1 week to check your renal function

## 2017-08-04 NOTE — DISCHARGE NOTE ADULT - SECONDARY DIAGNOSIS.
Influenza Atrial fibrillation, unspecified type Diabetes mellitus Hypertension SHELBY (acute kidney injury)

## 2017-08-04 NOTE — DISCHARGE NOTE ADULT - HOSPITAL COURSE
77 yo female w/ PMHx CHF, Afib on Coumadin, DMT2, vertigo. Comes to the ED c/o palpitations that started in the morning after walking to the bathroom, accompanied by dyspnea on exertion. Patient was admitted on June 19 for bronchitis and CHF exacerbation. Patient also refers bilateral leg edema and dry cough. Also refers orthopnea and paroxysmal nocturnal dyspnea. Sleeps with 3 pillows.    Upon bedside assessment, patient is in no distress, AAOx3, lying comfortably in bed.    Denies headache, visual changes, fever, chills, nausea, vomiting, chest pain, abdominal pain, diarrhea, dysuria.    8/3/17 Cardiac cath -Mild disease. RRB removed  8/4 cards: Moderate Mitral regurgitation on TTE,to get JULIANO to evaluate MR severity,continue HF meds   8/4 JULIANO:  Mitral annular calcification, tethered mitral valve leafletes. Mild mitral regurgitation which increased to moderate with increase in atrial fibrillation rates briefly to about 140 bpm. Calcified trileaflet aortic valve with decreased opening. Peak transaortic valve gradient equals 16 mm Hg, mean transaortic valve gradient equals 8 mm Hg, estimated aortic valve area equals 1.1 sqcm (by continuity equation), consistent with moderate aortic stenosis. Left atrial enlargement. Spontaneous echo contrast seen  in the left atrial appendage. No thrombus visualized. Decreased left atrial appendage velocities. Severe global left ventricular systolic dysfunction. Normal right ventricular size with decreased right ventricular systolic function. Estimated pulmonary artery systolic pressure equals 44 mm Hg, assuming right atrial pressure equals 10  mm Hg, consistent with mild pulmonary hypertension. Agitated saline injection demonstrates no evidence of a patent foramen ovale.  8/5 Cards: Acute on Chronic Systolic Heart failure,had cath-Non obstructive CAD,noted Moderate Mitral regurgitation on TTE,had JULIANO-mild-moderate MR,severe global LV dysfunction-increase Metoprolol for AF rate control,goal INR 2-2.5.  8/6  Nutrition- · Meals and Snacks: Diets modified for specific foods and ingredients; Other (specify); 1. Suggest: PO diet rx: Regular, Consistent Carbohydrate (no snacks), DASH/TLC (cholesterol and sodium restricted), Low Fat; PO supplement: Glucerna Shake 8oz. 1x daily (will provide additional ~220kcals, ~10g protein);                 2. Encourage & assist Pt with meals; Monitor PO diet tolerance;                3. Monitor labs, weights, hydration status;  · Medical and Food Supplements: Commercial beverage; Glucerna Shake 8oz. 1x daily (will provide additional ~220kcals, ~10g protein);  UCx -negative   8/8-EPS-Telemetry recorded one 18 beats likely aberrancy yesterday. ICD for primary prevention of sudden cardiac death if LVEF remains < or=35 % while on optimal medical therapy for over 3 months is recommended.   Continue optimal medical therapy with ACEI or ARB and BB-  F/u for a repeat echo in 3 months, ICD eval if EF < or =35%  Continue AC with warfarin for INR 2-3 for CHADS=5  8/8 Cardio: acute systolic chf - mildly volume overloaded change lasix to BID.  8/10 EP:Optimal medical therapy to include beta blocker and ACE-I.  Keep K+ >4.0 and Mg >2.0.  -F/u with Dr. Rhoades for possible ICD eval if EF < or =35%  -F/u Dr. Yip  for a repeat echo in 3 months  -Continue AC with warfarin for INR 2-3 for CHADS=5  -No Life Vest recommended     Patient medically stable for d/c today d/w cardiologist and medical attending. Aldactone was discontinued prior to d/c since SHELBY noted. Patient to have repeat blood work in 1week and have INR checked by monday by her PCP.

## 2017-08-04 NOTE — DISCHARGE NOTE ADULT - CARE PLAN
Principal Discharge DX:	Acute on chronic systolic congestive heart failure  Goal:	remain euvolemic, compliance with medications  Instructions for follow-up, activity and diet:	low salt diet, 1500ml fluid restriction, daily weights, continue cardiac medications as prescribed, follow up with Cardioligist in 1-2 weeks call and make an appointment, you will need a repeat echocardiogram in 3 months to assess if heart function as improved  Secondary Diagnosis:	Influenza  Instructions for follow-up, activity and diet:	complete 2 mores doses of Tamiflu  Secondary Diagnosis:	Atrial fibrillation, unspecified type  Instructions for follow-up, activity and diet:	continue coumadin for anticoagulation, your dose was changed, have your INR checked on Monday, continue rate control with Metoprolol  Secondary Diagnosis:	Diabetes mellitus  Instructions for follow-up, activity and diet:	consistent carbohydrate diet, Metformin and glipizide  Secondary Diagnosis:	Hypertension  Instructions for follow-up, activity and diet:	low salt diet, continue antihypertensive medications  Secondary Diagnosis:	SHELBY (acute kidney injury)  Instructions for follow-up, activity and diet:	have your blood checked in 1 week to check your renal function, aldactone was discontinued

## 2017-08-04 NOTE — DISCHARGE NOTE ADULT - CARE PROVIDER_API CALL
eGt Yip (MD), Cardiovascular Disease; Internal Medicine  [address] [place] [address]  Phone: [phone]  Fax: [phone]

## 2017-08-04 NOTE — DISCHARGE NOTE ADULT - MEDICATION SUMMARY - MEDICATIONS TO CHANGE
I will SWITCH the dose or number of times a day I take the medications listed below when I get home from the hospital:    Coumadin 2.5 mg oral tablet  -- 1 tab(s) ( 2.5 mg) by mouth 2 times a week on saturday and sunday .   -- Do not take this drug if you are pregnant.  It is very important that you take or use this exactly as directed.  Do not skip doses or discontinue unless directed by your doctor.  Obtain medical advice before taking any non-prescription drugs as some may affect the action of this medication.    Coumadin 2.5 mg oral tablet  -- 2 tab(s) by mouth ( 5 mg) Monday through Friday  -- Do not take this drug if you are pregnant.  It is very important that you take or use this exactly as directed.  Do not skip doses or discontinue unless directed by your doctor.  Obtain medical advice before taking any non-prescription drugs as some may affect the action of this medication.    metoprolol tartrate 25 mg oral tablet  -- 1 tab(s) by mouth 2 times a day    metoprolol tartrate 25 mg oral tablet  -- 1 tab(s) by mouth 2 times a day    furosemide 100 mg/100 mL-0.9% intravenous solution  -- 40 milliliter(s) intravenous once a day

## 2017-08-04 NOTE — DISCHARGE NOTE ADULT - MEDICATION SUMMARY - MEDICATIONS TO TAKE
I will START or STAY ON the medications listed below when I get home from the hospital:    aspirin 81 mg oral tablet, chewable  -- 1 tab(s) by mouth once a day  -- Indication: For Atrial fibrillation, unspecified type    lisinopril 5 mg oral tablet  -- 1 tab(s) by mouth once a day  -- Indication: For CHF (congestive heart failure)    digoxin 125 mcg (0.125 mg) oral tablet  -- 1 tab(s) by mouth once a day  -- Indication: For Atrial fibrillation, unspecified type    warfarin 3 mg oral tablet  -- 3 milligram(s) by mouth once a day  -- Indication: For Atrial fibrillation, unspecified type    glipiZIDE 10 mg oral tablet  -- 1 tab(s) by mouth once a day  -- Indication: For Diabetes mellitus    metFORMIN 1000 mg oral tablet  -- 1 tab(s) by mouth 2 times a day  -- Indication: For Diabetes mellitus    meclizine 25 mg oral tablet  -- 1 tab(s) by mouth 3 times a day  -- Indication: For vertigo     atorvastatin 40 mg oral tablet  -- 1 tab(s) by mouth once a day (at bedtime)  -- Indication: For Hyperlipidemia     oseltamivir 75 mg oral capsule  -- 1 cap(s) by mouth every 12 hours x 2 more doses   -- Indication: For Influenza    metoprolol tartrate 50 mg oral tablet  -- 1 tab(s) by mouth 2 times a day  -- Indication: For Atrial fibrillation, unspecified type    furosemide 40 mg oral tablet  -- 1 tab(s) by mouth 2 times a day  -- Indication: For CHF (congestive heart failure)    ferrous sulfate (as elemental iron) 45 mg oral tablet, extended release  -- 1 tab(s) by mouth once a day  -- Indication: For Anemia     simethicone 80 mg oral tablet, chewable  -- 1 tab(s) by mouth every 6 hours, As needed, Gas  -- Indication: For gas I will START or STAY ON the medications listed below when I get home from the hospital:    aspirin 81 mg oral tablet, chewable  -- 1 tab(s) by mouth once a day  -- Indication: For Atrial fibrillation, unspecified type    lisinopril 5 mg oral tablet  -- 1 tab(s) by mouth once a day  -- Indication: For CHF (congestive heart failure)    digoxin 125 mcg (0.125 mg) oral tablet  -- 1 tab(s) by mouth once a day  -- Indication: For Atrial fibrillation, unspecified type    warfarin 3 mg oral tablet  -- 3 milligram(s) by mouth once a day  -- Indication: For Atrial fibrillation, unspecified type    glipiZIDE 10 mg oral tablet  -- 1 tab(s) by mouth once a day  -- Indication: For Diabetes mellitus    metFORMIN 1000 mg oral tablet  -- 1 tab(s) by mouth 2 times a day  -- Indication: For Diabetes mellitus    meclizine 25 mg oral tablet  -- 1 tab(s) by mouth 3 times a day  -- Indication: For vertigo     atorvastatin 40 mg oral tablet  -- 1 tab(s) by mouth once a day (at bedtime)  -- Indication: For Prophylaxis    oseltamivir 75 mg oral capsule  -- 1 cap(s) by mouth every 12 hours x 2 more doses   -- Indication: For Influenza    metoprolol tartrate 50 mg oral tablet  -- 1 tab(s) by mouth 2 times a day  -- Indication: For Atrial fibrillation, unspecified type    furosemide 40 mg oral tablet  -- 1 tab(s) by mouth 2 times a day  -- Indication: For CHF (congestive heart failure)    ferrous sulfate (as elemental iron) 45 mg oral tablet, extended release  -- 1 tab(s) by mouth once a day  -- Indication: For Anemia     simethicone 80 mg oral tablet, chewable  -- 1 tab(s) by mouth every 6 hours, As needed, Gas  -- Indication: For gas

## 2017-08-04 NOTE — DISCHARGE NOTE ADULT - MEDICATION SUMMARY - MEDICATIONS TO STOP TAKING
I will STOP taking the medications listed below when I get home from the hospital:    insulin lispro 100 units/mL subcutaneous solution  --  subcutaneous 3 times a day (before meals); 1 Unit(s) if Glucose 151 - 200  2 Unit(s) if Glucose 201 - 250  3 Unit(s) if Glucose 251 - 300  4 Unit(s) if Glucose 301 - 350  5 Unit(s) if Glucose 351 - 400  6 Unit(s) if Glucose GREATER THAN 400

## 2017-08-04 NOTE — DISCHARGE NOTE ADULT - HOME CARE AGENCY
[facility] [phone]  Visiting RN to see patient day after discharge. Start of visit 8/10/17. Physical therapy to follow.

## 2017-08-04 NOTE — PROGRESS NOTE ADULT - SUBJECTIVE AND OBJECTIVE BOX
SUBJ: 77 yo female w/ PMHx HFrEF, Atrial Fibrillation CHADS2-5 on Coumadin, DMT2, vertigo admitted with c/o palpitations accompanied by dyspnea on exertion.also refers to bilateral leg edema ,notes orthopnea and paroxysmal nocturnal dyspnea.Had cardiac cath-Non obstructive CAD       MEDICATIONS  (STANDING):  heparin  Infusion.  Unit(s)/Hr (14 mL/Hr) IV Continuous <Continuous>  aspirin  chewable 81 milliGRAM(s) Oral daily  lisinopril 5 milliGRAM(s) Oral daily  digoxin     Tablet 0.125 milliGRAM(s) Oral daily  meclizine 25 milliGRAM(s) Oral three times a day  atorvastatin 40 milliGRAM(s) Oral at bedtime  furosemide   Injectable 40 milliGRAM(s) IV Push daily  insulin lispro (HumaLOG) corrective regimen sliding scale   SubCutaneous three times a day before meals  metoprolol 25 milliGRAM(s) Oral two times a day    MEDICATIONS  (PRN):  heparin  Injectable 6500 Unit(s) IV Push every 6 hours PRN For aPTT less than 40  heparin  Injectable 3000 Unit(s) IV Push every 6 hours PRN For aPTT between 40 - 57  simethicone 80 milliGRAM(s) Chew every 6 hours PRN Gas  dextrose Gel 1 Dose(s) Oral once PRN Blood Glucose LESS THAN 70 milliGRAM(s)/deciliter  glucagon  Injectable 1 milliGRAM(s) IntraMuscular once PRN Glucose LESS THAN 70 milligrams/deciliter            Vital Signs Last 24 Hrs  T(C): 37.5 (04 Aug 2017 05:30), Max: 37.6 (03 Aug 2017 18:06)  T(F): 99.5 (04 Aug 2017 05:30), Max: 99.6 (03 Aug 2017 18:06)  HR: 97 (04 Aug 2017 05:30) (91 - 102)  BP: 138/90 (04 Aug 2017 05:30) (128/90 - 149/65)  RR: 16 (04 Aug 2017 05:30) (14 - 16)  SpO2: 100% (04 Aug 2017 05:30) (100% - 100%)      PHYSICAL EXAM:  · CONSTITUTIONAL:	Well-developed, well nourished    BMI-29  · EYES:	EOMI; PERRL; no drainage or redness  · ENMT:	No oral lesions; no gross abnormalities  · NECK:	No bruits; no thyromegaly or nodules  ·BACK:	No deformity or limitation of movement  ·RESPIRATORY:   Airway patent; breath sounds equal; good air movement; respirations non-labored; clear to auscultation bilaterally; no chest wall tenderness; no intercostal retractions; no rales,rhonchi or wheeze  · CARDIOVASCULAR	Irregular  irregular rhythm  no rub  3/6 systolic murmur   normal PMI  . GASTROINTESTINAL:  no distention; no masses palpable; bowel sounds normal; no rebound tenderness; no guarding; no rigidity; no organomegaly  · EXTREMITIES: No cyanosis, clubbing 1+ edema  · VASCULAR: 	Equal and normal pulses (carotid, femoral, dorsalis pedis)  ·NEUROLOGICAL:   alert and oriented x 3; sensation intact; deep reflexes intact; cranial nerves intact; no spontaneous movement; superficial reflexes intact; normal strength  · SKIN:	No lesions; no rash  . LYMPH NODES:	No lymphadedenopathy  · MUSCULOSKELETAL:   No calf tenderness  no joint swelling.  	  TELEMETRY:Atrial Fibrillation rates-90's    TTE: 6/20/2017-- Moderate global left ventricular dysfunction.Grade II diastolic dysfunction Moderate-severe mitral regurgitation.        LABS:                        11.2   8.83  )-----------( 289      ( 04 Aug 2017 03:50 )             35.8     08-04    141  |  102  |  18  ----------------------------<  155<H>  4.2   |  23  |  1.16    Ca    9.2      04 Aug 2017 03:50  Phos  2.3     08-03  Mg     1.8     08-04    PT/INR - ( 03 Aug 2017 21:20 )   PT: 15.6 SEC;   INR: 1.38          PTT - ( 04 Aug 2017 03:50 )  PTT:72.7 SEC    I&O's Summary    03 Aug 2017 07:01  -  04 Aug 2017 06:26  --------------------------------------------------------  IN: 440 mL / OUT: 400 mL / NET: 40 mL        IMPRESSION AND PLAN:    Jazmín Patterson 77 yo female w/ PMHx HFrEF, Afib on Coumadin, DMT2, vertigo admitted with Acute on Chronic Systolic Heart failure,had cath-Non obstructive CAD,noted Moderate Mitral regurgitation on TTE,to get JULIANO to evaluate MR severity,continue HF meds

## 2017-08-04 NOTE — DISCHARGE NOTE ADULT - PATIENT PORTAL LINK FT
“You can access the [employer] Patient Portal, offered by [facility], by registering with the following website: [email or web address]”

## 2017-08-04 NOTE — DISCHARGE NOTE ADULT - PLAN OF CARE
remain euvolemic, compliance with medications low salt diet, 1500ml fluid restriction, daily weights, continue cardiac medications as prescribed, follow up with Cardioligist in 1-2 weeks call and make an appointment, you will need a repeat echocardiogram in 3 months to assess if heart function as improved complete 2 mores doses of Tamiflu continue coumadin for anticoagulation, your dose was changed, have your INR checked on Monday, continue rate control with Metoprolol consistent carbohydrate diet, Metformin and glipizide low salt diet, continue antihypertensive medications have your blood checked in 1 week to check your renal function, aldactone was discontinued

## 2017-08-05 LAB
APTT BLD: 113.9 SEC — HIGH (ref 27.5–37.4)
APTT BLD: 153.1 SEC — CRITICAL HIGH (ref 27.5–37.4)
APTT BLD: 88.6 SEC — HIGH (ref 27.5–37.4)
BUN SERPL-MCNC: 17 MG/DL — SIGNIFICANT CHANGE UP (ref 7–23)
CALCIUM SERPL-MCNC: 8.9 MG/DL — SIGNIFICANT CHANGE UP (ref 8.4–10.5)
CHLORIDE SERPL-SCNC: 98 MMOL/L — SIGNIFICANT CHANGE UP (ref 98–107)
CO2 SERPL-SCNC: 14 MMOL/L — LOW (ref 22–31)
CREAT SERPL-MCNC: 1.18 MG/DL — SIGNIFICANT CHANGE UP (ref 0.5–1.3)
GLUCOSE SERPL-MCNC: 128 MG/DL — HIGH (ref 70–99)
HCT VFR BLD CALC: 37.6 % — SIGNIFICANT CHANGE UP (ref 34.5–45)
HGB BLD-MCNC: 11.3 G/DL — LOW (ref 11.5–15.5)
INR BLD: 1.54 — HIGH (ref 0.88–1.17)
MAGNESIUM SERPL-MCNC: 1.8 MG/DL — SIGNIFICANT CHANGE UP (ref 1.6–2.6)
MCHC RBC-ENTMCNC: 21.2 PG — LOW (ref 27–34)
MCHC RBC-ENTMCNC: 30.1 % — LOW (ref 32–36)
MCV RBC AUTO: 70.5 FL — LOW (ref 80–100)
NRBC # FLD: 0 — SIGNIFICANT CHANGE UP
PLATELET # BLD AUTO: 234 K/UL — SIGNIFICANT CHANGE UP (ref 150–400)
PMV BLD: 10.4 FL — SIGNIFICANT CHANGE UP (ref 7–13)
POTASSIUM SERPL-MCNC: 4.4 MMOL/L — SIGNIFICANT CHANGE UP (ref 3.5–5.3)
POTASSIUM SERPL-SCNC: 4.4 MMOL/L — SIGNIFICANT CHANGE UP (ref 3.5–5.3)
PROTHROM AB SERPL-ACNC: 17.4 SEC — HIGH (ref 9.8–13.1)
RBC # BLD: 5.33 M/UL — HIGH (ref 3.8–5.2)
RBC # FLD: 19.4 % — HIGH (ref 10.3–14.5)
SODIUM SERPL-SCNC: 132 MMOL/L — LOW (ref 135–145)
WBC # BLD: 4.61 K/UL — SIGNIFICANT CHANGE UP (ref 3.8–10.5)
WBC # FLD AUTO: 4.61 K/UL — SIGNIFICANT CHANGE UP (ref 3.8–10.5)

## 2017-08-05 PROCEDURE — 71010: CPT | Mod: 26

## 2017-08-05 RX ORDER — DOCUSATE SODIUM 100 MG
100 CAPSULE ORAL THREE TIMES A DAY
Qty: 0 | Refills: 0 | Status: DISCONTINUED | OUTPATIENT
Start: 2017-08-05 | End: 2017-08-05

## 2017-08-05 RX ORDER — ACETAMINOPHEN 500 MG
650 TABLET ORAL EVERY 6 HOURS
Qty: 0 | Refills: 0 | Status: DISCONTINUED | OUTPATIENT
Start: 2017-08-05 | End: 2017-08-10

## 2017-08-05 RX ORDER — SPIRONOLACTONE 25 MG/1
25 TABLET, FILM COATED ORAL DAILY
Qty: 0 | Refills: 0 | Status: DISCONTINUED | OUTPATIENT
Start: 2017-08-05 | End: 2017-08-10

## 2017-08-05 RX ORDER — METOPROLOL TARTRATE 50 MG
50 TABLET ORAL
Qty: 0 | Refills: 0 | Status: DISCONTINUED | OUTPATIENT
Start: 2017-08-05 | End: 2017-08-10

## 2017-08-05 RX ORDER — WARFARIN SODIUM 2.5 MG/1
5 TABLET ORAL ONCE
Qty: 0 | Refills: 0 | Status: COMPLETED | OUTPATIENT
Start: 2017-08-05 | End: 2017-08-05

## 2017-08-05 RX ORDER — SENNA PLUS 8.6 MG/1
2 TABLET ORAL AT BEDTIME
Qty: 0 | Refills: 0 | Status: DISCONTINUED | OUTPATIENT
Start: 2017-08-05 | End: 2017-08-05

## 2017-08-05 RX ADMIN — HEPARIN SODIUM 700 UNIT(S)/HR: 5000 INJECTION INTRAVENOUS; SUBCUTANEOUS at 16:07

## 2017-08-05 RX ADMIN — Medication 0.12 MILLIGRAM(S): at 05:55

## 2017-08-05 RX ADMIN — Medication 25 MILLIGRAM(S): at 05:55

## 2017-08-05 RX ADMIN — HEPARIN SODIUM 0 UNIT(S)/HR: 5000 INJECTION INTRAVENOUS; SUBCUTANEOUS at 04:43

## 2017-08-05 RX ADMIN — ATORVASTATIN CALCIUM 40 MILLIGRAM(S): 80 TABLET, FILM COATED ORAL at 22:30

## 2017-08-05 RX ADMIN — HEPARIN SODIUM 900 UNIT(S)/HR: 5000 INJECTION INTRAVENOUS; SUBCUTANEOUS at 05:43

## 2017-08-05 RX ADMIN — Medication 40 MILLIGRAM(S): at 05:55

## 2017-08-05 RX ADMIN — Medication 81 MILLIGRAM(S): at 13:17

## 2017-08-05 RX ADMIN — WARFARIN SODIUM 5 MILLIGRAM(S): 2.5 TABLET ORAL at 18:09

## 2017-08-05 RX ADMIN — LISINOPRIL 5 MILLIGRAM(S): 2.5 TABLET ORAL at 05:55

## 2017-08-05 RX ADMIN — HEPARIN SODIUM 700 UNIT(S)/HR: 5000 INJECTION INTRAVENOUS; SUBCUTANEOUS at 23:23

## 2017-08-05 RX ADMIN — Medication 1: at 13:17

## 2017-08-05 RX ADMIN — Medication 25 MILLIGRAM(S): at 13:17

## 2017-08-05 RX ADMIN — Medication 50 MILLIGRAM(S): at 18:09

## 2017-08-05 RX ADMIN — Medication 25 MILLIGRAM(S): at 22:30

## 2017-08-05 NOTE — PROGRESS NOTE ADULT - SUBJECTIVE AND OBJECTIVE BOX
SUBJ:75 yo female w/ PMHx HFrEF, Atrial Fibrillation CHADS2-5 on Coumadin, DMT2, vertigo admitted with c/o palpitations accompanied by dyspnea on exertion.also refers to bilateral leg edema ,notes orthopnea and paroxysmal nocturnal dyspnea.Had cardiac cath-Non obstructive CAD       MEDICATIONS  (STANDING):  heparin  Infusion.  Unit(s)/Hr (14 mL/Hr) IV Continuous <Continuous>  aspirin  chewable 81 milliGRAM(s) Oral daily  lisinopril 5 milliGRAM(s) Oral daily  digoxin     Tablet 0.125 milliGRAM(s) Oral daily  meclizine 25 milliGRAM(s) Oral three times a day  atorvastatin 40 milliGRAM(s) Oral at bedtime  furosemide   Injectable 40 milliGRAM(s) IV Push daily  insulin lispro (HumaLOG) corrective regimen sliding scale   SubCutaneous three times a day before meals  metoprolol 50 milliGRAM(s) Oral two times a day  spironolactone 25 milliGRAM(s) Oral daily    MEDICATIONS  (PRN):  heparin  Injectable 6500 Unit(s) IV Push every 6 hours PRN For aPTT less than 40  heparin  Injectable 3000 Unit(s) IV Push every 6 hours PRN For aPTT between 40 - 57  simethicone 80 milliGRAM(s) Chew every 6 hours PRN Gas  dextrose Gel 1 Dose(s) Oral once PRN Blood Glucose LESS THAN 70 milliGRAM(s)/deciliter  glucagon  Injectable 1 milliGRAM(s) IntraMuscular once PRN Glucose LESS THAN 70 milligrams/deciliter  benzocaine 15 mG/menthol 3.6 mG Lozenge 1 Lozenge Oral three times a day PRN Sore Throat            Vital Signs Last 24 Hrs  T(C): 37.2 (05 Aug 2017 05:50), Max: 37.5 (04 Aug 2017 14:04)  T(F): 98.9 (05 Aug 2017 05:50), Max: 99.5 (04 Aug 2017 14:04)  HR: 98 (05 Aug 2017 05:50) (98 - 111)  BP: 140/95 (05 Aug 2017 05:50) (140/94 - 151/96)  BP(mean): --  RR: 18 (05 Aug 2017 05:50) (16 - 18)  SpO2: 97% (05 Aug 2017 05:50) (91% - 97%)    REVIEW OF SYSTEMS:  CONSTITUTIONAL: No fever, weight loss, or fatigue  EYES: No eye pain, visual disturbances, or discharge  ENMT:  No difficulty hearing, tinnitus, vertigo; No sinus or throat pain  NECK: No pain or stiffness  RESPIRATORY: No cough, wheezing, chills or hemoptysis; No shortness of breath  CARDIOVASCULAR: No chest pain, palpitations, dizziness, or leg swelling  GASTROINTESTINAL: No abdominal or epigastric pain. No nausea, vomiting, or hematemesis; No diarrhea or constipation. No melena or hematochezia.  GENITOURINARY: No dysuria, frequency, hematuria, or incontinence  NEUROLOGICAL: No headaches, memory loss, loss of strength, numbness, or tremors  SKIN: No itching, burning, rashes, or lesions   LYMPH NODES: No enlarged glands  ENDOCRINE: No heat or cold intolerance; No hair loss  MUSCULOSKELETAL: No joint pain or swelling; No muscle, back, or extremity pain  PSYCHIATRIC: No depression, anxiety, mood swings, or difficulty sleeping  HEME/LYMPH: No easy bruising, or bleeding gums  ALLERY AND IMMUNOLOGIC: No hives or eczema    PHYSICAL EXAM:  · CONSTITUTIONAL:	Well-developed, well nourished    BMI-  · EYES:	EOMI; PERRL; no drainage or redness  · ENMT	No oral lesions; no gross abnormalities  · NECK:	No bruits; no thyromegaly or nodules  ·BACK:	No deformity or limitation of movement  ·RESPIRATORY:   airway patent; breath sounds equal; good air movement; respirations non-labored; clear to auscultation bilaterally; no chest wall tenderness; no intercostal retractions; no rales,rhonchi or wheeze  · CARDIOVASCULAR	regular rate and rhythm  no rub  no murmur  normal PMI  . GASTROINTESTINAL:  no distention; no masses palpable; bowel sounds normal; no rebound tenderness; no guarding; no rigidity; no organomegaly  · EXTREMITIES: No cyanosis, clubbing or edema  · VASCULAR: 	Equal and normal pulses (carotid, femoral, dorsalis pedis)  ·NEUROLOGICAL:   alert and oriented x 3; sensation intact; deep reflexes intact; cranial nerves intact; no spontaneous movement; superficial reflexes intact; normal strength  · SKIN:	No lesions; no rash  . LYMPH NODES:	No lymphadedenopathy  · MUSCULOSKELETAL:   No calf tenderness  no joint swelling	  TELEMETRY:    ECG:    TTE:    LABS:                        11.3   4.61  )-----------( 234      ( 05 Aug 2017 03:47 )             37.6     08-05    132<L>  |  98  |  17  ----------------------------<  128<H>  4.4   |  14<L>  |  1.18    Ca    8.9      05 Aug 2017 03:47  Mg     1.8     08-05          PT/INR - ( 04 Aug 2017 03:50 )   PT: 16.3 SEC;   INR: 1.44          PTT - ( 05 Aug 2017 03:47 )  PTT:153.1 SEC    I&O's Summary    04 Aug 2017 07:01  -  05 Aug 2017 07:00  --------------------------------------------------------  IN: 969 mL / OUT: 300 mL / NET: 669 mL      BNP  RADIOLOGY & ADDITIONAL STUDIES:    IMPRESSION AND PLAN:

## 2017-08-05 NOTE — CHART NOTE - NSCHARTNOTEFT_GEN_A_CORE
Called by RN to assess patient because patient developed low-grade fever of 100.9F.  Assessed patient at bedside.  Patient states she has developed a dry cough over the past 2 days.  Otherwise patient states she "feels fine."  Denies CP, SOB, dizziness, abdominal pain, NVDC, rhinorrhea.      Vital Signs Last 24 Hrs  T(C): 38.3 (05 Aug 2017 20:25), Max: 38.3 (05 Aug 2017 20:25)  T(F): 100.9 (05 Aug 2017 20:25), Max: 100.9 (05 Aug 2017 20:25)  HR: 76 (05 Aug 2017 20:25) (76 - 102)  BP: 108/89 (05 Aug 2017 20:25) (108/89 - 140/95)  BP(mean): --  RR: 18 (05 Aug 2017 20:25) (18 - 18)  SpO2: 95% (05 Aug 2017 20:25) (95% - 98%)    Exam:  General: A&Ox3, in NAD  Cardiac: S1, S2, irregularly irregular rhythm  Lungs: dry cough appreciated, lungs CTA B/L, breathing is unlabored  Abdomen: soft, NT, ND, postive BS  Extremities: 1+ pedal edema b/l                          11.3   4.61  )-----------( 234      ( 05 Aug 2017 03:47 )             37.6     08-05    132<L>  |  98  |  17  ----------------------------<  128<H>  4.4   |  14<L>  |  1.18    Ca    8.9      05 Aug 2017 03:47  Mg     1.8     08-05    A/P: 75 yo female w/ PMHx HFrEF, Atrial Fibrillation CHADS2-5 on Coumadin, DMT2, vertigo admitted with c/o palpitations accompanied by dyspnea on exertion.  Currently being diuresed with IV lasix for acute on chronic heart failure.  s/p cath showing non-obstructive CAD.  s/p JULIANO showing moderate AS and moderate.  Now with low-grade fever (100.9F) and non-productive cough.    -Tylenol PRN fever.  -Will send fever workup: blood cultures x 2 sets, urine culture, UA, RVP, and CXR.   -Continue to trend temperatures and WBC count.   -Will continue to monitor and follow up above.

## 2017-08-06 LAB
APPEARANCE UR: CLEAR — SIGNIFICANT CHANGE UP
APTT BLD: 70.4 SEC — HIGH (ref 27.5–37.4)
B PERT DNA SPEC QL NAA+PROBE: SIGNIFICANT CHANGE UP
BILIRUB UR-MCNC: NEGATIVE — SIGNIFICANT CHANGE UP
BLOOD UR QL VISUAL: NEGATIVE — SIGNIFICANT CHANGE UP
BUN SERPL-MCNC: 25 MG/DL — HIGH (ref 7–23)
C PNEUM DNA SPEC QL NAA+PROBE: NOT DETECTED — SIGNIFICANT CHANGE UP
CALCIUM SERPL-MCNC: 8.8 MG/DL — SIGNIFICANT CHANGE UP (ref 8.4–10.5)
CHLORIDE SERPL-SCNC: 98 MMOL/L — SIGNIFICANT CHANGE UP (ref 98–107)
CO2 SERPL-SCNC: 22 MMOL/L — SIGNIFICANT CHANGE UP (ref 22–31)
COLOR SPEC: YELLOW — SIGNIFICANT CHANGE UP
CREAT SERPL-MCNC: 1.34 MG/DL — HIGH (ref 0.5–1.3)
FLUAV H1 2009 PAND RNA SPEC QL NAA+PROBE: NOT DETECTED — SIGNIFICANT CHANGE UP
FLUAV H1 RNA SPEC QL NAA+PROBE: NOT DETECTED — SIGNIFICANT CHANGE UP
FLUAV H3 RNA SPEC QL NAA+PROBE: NOT DETECTED — SIGNIFICANT CHANGE UP
FLUAV SUBTYP SPEC NAA+PROBE: POSITIVE — HIGH
FLUBV RNA SPEC QL NAA+PROBE: NOT DETECTED — SIGNIFICANT CHANGE UP
GLUCOSE SERPL-MCNC: 107 MG/DL — HIGH (ref 70–99)
GLUCOSE UR-MCNC: NEGATIVE — SIGNIFICANT CHANGE UP
HADV DNA SPEC QL NAA+PROBE: NOT DETECTED — SIGNIFICANT CHANGE UP
HCOV 229E RNA SPEC QL NAA+PROBE: NOT DETECTED — SIGNIFICANT CHANGE UP
HCOV HKU1 RNA SPEC QL NAA+PROBE: NOT DETECTED — SIGNIFICANT CHANGE UP
HCOV NL63 RNA SPEC QL NAA+PROBE: NOT DETECTED — SIGNIFICANT CHANGE UP
HCOV OC43 RNA SPEC QL NAA+PROBE: NOT DETECTED — SIGNIFICANT CHANGE UP
HCT VFR BLD CALC: 34.7 % — SIGNIFICANT CHANGE UP (ref 34.5–45)
HGB BLD-MCNC: 10.8 G/DL — LOW (ref 11.5–15.5)
HMPV RNA SPEC QL NAA+PROBE: NOT DETECTED — SIGNIFICANT CHANGE UP
HPIV1 RNA SPEC QL NAA+PROBE: NOT DETECTED — SIGNIFICANT CHANGE UP
HPIV2 RNA SPEC QL NAA+PROBE: NOT DETECTED — SIGNIFICANT CHANGE UP
HPIV3 RNA SPEC QL NAA+PROBE: NOT DETECTED — SIGNIFICANT CHANGE UP
HPIV4 RNA SPEC QL NAA+PROBE: NOT DETECTED — SIGNIFICANT CHANGE UP
INR BLD: 1.43 — HIGH (ref 0.88–1.17)
KETONES UR-MCNC: NEGATIVE — SIGNIFICANT CHANGE UP
LEUKOCYTE ESTERASE UR-ACNC: NEGATIVE — SIGNIFICANT CHANGE UP
M PNEUMO DNA SPEC QL NAA+PROBE: NOT DETECTED — SIGNIFICANT CHANGE UP
MAGNESIUM SERPL-MCNC: 1.8 MG/DL — SIGNIFICANT CHANGE UP (ref 1.6–2.6)
MCHC RBC-ENTMCNC: 20.7 PG — LOW (ref 27–34)
MCHC RBC-ENTMCNC: 31.1 % — LOW (ref 32–36)
MCV RBC AUTO: 66.5 FL — LOW (ref 80–100)
MUCOUS THREADS # UR AUTO: SIGNIFICANT CHANGE UP
NITRITE UR-MCNC: NEGATIVE — SIGNIFICANT CHANGE UP
NRBC # FLD: 0 — SIGNIFICANT CHANGE UP
PH UR: 6 — SIGNIFICANT CHANGE UP (ref 4.6–8)
PLATELET # BLD AUTO: 244 K/UL — SIGNIFICANT CHANGE UP (ref 150–400)
PMV BLD: 10.2 FL — SIGNIFICANT CHANGE UP (ref 7–13)
POTASSIUM SERPL-MCNC: 3.8 MMOL/L — SIGNIFICANT CHANGE UP (ref 3.5–5.3)
POTASSIUM SERPL-SCNC: 3.8 MMOL/L — SIGNIFICANT CHANGE UP (ref 3.5–5.3)
PROT UR-MCNC: 20 — SIGNIFICANT CHANGE UP
PROTHROM AB SERPL-ACNC: 16.1 SEC — HIGH (ref 9.8–13.1)
RBC # BLD: 5.22 M/UL — HIGH (ref 3.8–5.2)
RBC # FLD: 17.2 % — HIGH (ref 10.3–14.5)
RBC CASTS # UR COMP ASSIST: SIGNIFICANT CHANGE UP (ref 0–?)
RSV RNA SPEC QL NAA+PROBE: NOT DETECTED — SIGNIFICANT CHANGE UP
RV+EV RNA SPEC QL NAA+PROBE: NOT DETECTED — SIGNIFICANT CHANGE UP
SODIUM SERPL-SCNC: 134 MMOL/L — LOW (ref 135–145)
SP GR SPEC: 1.01 — SIGNIFICANT CHANGE UP (ref 1–1.03)
SPECIMEN SOURCE: SIGNIFICANT CHANGE UP
SPECIMEN SOURCE: SIGNIFICANT CHANGE UP
UROBILINOGEN FLD QL: NORMAL E.U. — SIGNIFICANT CHANGE UP (ref 0.1–0.2)
WBC # BLD: 4.46 K/UL — SIGNIFICANT CHANGE UP (ref 3.8–10.5)
WBC # FLD AUTO: 4.46 K/UL — SIGNIFICANT CHANGE UP (ref 3.8–10.5)
WBC UR QL: SIGNIFICANT CHANGE UP (ref 0–?)

## 2017-08-06 RX ORDER — WARFARIN SODIUM 2.5 MG/1
6 TABLET ORAL ONCE
Qty: 0 | Refills: 0 | Status: COMPLETED | OUTPATIENT
Start: 2017-08-06 | End: 2017-08-06

## 2017-08-06 RX ORDER — FUROSEMIDE 40 MG
40 TABLET ORAL DAILY
Qty: 0 | Refills: 0 | Status: DISCONTINUED | OUTPATIENT
Start: 2017-08-06 | End: 2017-08-08

## 2017-08-06 RX ADMIN — Medication 0.12 MILLIGRAM(S): at 06:12

## 2017-08-06 RX ADMIN — Medication 25 MILLIGRAM(S): at 13:20

## 2017-08-06 RX ADMIN — WARFARIN SODIUM 6 MILLIGRAM(S): 2.5 TABLET ORAL at 17:30

## 2017-08-06 RX ADMIN — Medication 1: at 13:20

## 2017-08-06 RX ADMIN — SPIRONOLACTONE 25 MILLIGRAM(S): 25 TABLET, FILM COATED ORAL at 06:11

## 2017-08-06 RX ADMIN — Medication 40 MILLIGRAM(S): at 06:11

## 2017-08-06 RX ADMIN — ATORVASTATIN CALCIUM 40 MILLIGRAM(S): 80 TABLET, FILM COATED ORAL at 21:43

## 2017-08-06 RX ADMIN — Medication 25 MILLIGRAM(S): at 21:43

## 2017-08-06 RX ADMIN — HEPARIN SODIUM 700 UNIT(S)/HR: 5000 INJECTION INTRAVENOUS; SUBCUTANEOUS at 08:28

## 2017-08-06 RX ADMIN — Medication 25 MILLIGRAM(S): at 06:12

## 2017-08-06 RX ADMIN — Medication 1: at 17:30

## 2017-08-06 RX ADMIN — LISINOPRIL 5 MILLIGRAM(S): 2.5 TABLET ORAL at 06:12

## 2017-08-06 RX ADMIN — Medication 81 MILLIGRAM(S): at 13:20

## 2017-08-06 RX ADMIN — Medication 50 MILLIGRAM(S): at 18:20

## 2017-08-06 RX ADMIN — Medication 50 MILLIGRAM(S): at 06:12

## 2017-08-06 RX ADMIN — Medication 75 MILLIGRAM(S): at 17:30

## 2017-08-06 NOTE — PROGRESS NOTE ADULT - SUBJECTIVE AND OBJECTIVE BOX
SUBJ:75 yo female w/ PMHx HFrEF, Atrial Fibrillation CHADS2-5 on Coumadin, DMT2, vertigo admitted with c/o palpitations accompanied by dyspnea on exertion.also refers to bilateral leg edema ,notes orthopnea and paroxysmal nocturnal dyspnea.Had cardiac cath-Non obstructive CAD,JULIANO-mild -moderate MR,remains in atrial fibrillation rate better controlled      MEDICATIONS  (STANDING):  heparin  Infusion.  Unit(s)/Hr (14 mL/Hr) IV Continuous <Continuous>  aspirin  chewable 81 milliGRAM(s) Oral daily  lisinopril 5 milliGRAM(s) Oral daily  digoxin     Tablet 0.125 milliGRAM(s) Oral daily  meclizine 25 milliGRAM(s) Oral three times a day  atorvastatin 40 milliGRAM(s) Oral at bedtime  furosemide   Injectable 40 milliGRAM(s) IV Push daily  insulin lispro (HumaLOG) corrective regimen sliding scale   SubCutaneous three times a day before meals  metoprolol 50 milliGRAM(s) Oral two times a day  spironolactone 25 milliGRAM(s) Oral daily    MEDICATIONS  (PRN):  heparin  Injectable 6500 Unit(s) IV Push every 6 hours PRN For aPTT less than 40  heparin  Injectable 3000 Unit(s) IV Push every 6 hours PRN For aPTT between 40 - 57  simethicone 80 milliGRAM(s) Chew every 6 hours PRN Gas  dextrose Gel 1 Dose(s) Oral once PRN Blood Glucose LESS THAN 70 milliGRAM(s)/deciliter  glucagon  Injectable 1 milliGRAM(s) IntraMuscular once PRN Glucose LESS THAN 70 milligrams/deciliter  benzocaine 15 mG/menthol 3.6 mG Lozenge 1 Lozenge Oral three times a day PRN Sore Throat  acetaminophen   Tablet 650 milliGRAM(s) Oral every 6 hours PRN For Temp greater than 38 C (100.4 F)            Vital Signs Last 24 Hrs  T(C): 38.1 (06 Aug 2017 06:10), Max: 38.3 (05 Aug 2017 20:25)  T(F): 100.5 (06 Aug 2017 06:10), Max: 100.9 (05 Aug 2017 20:25)  HR: 90 (06 Aug 2017 06:10) (76 - 102)  BP: 116/64 (06 Aug 2017 06:10) (108/89 - 124/89)  RR: 19 (06 Aug 2017 06:10) (18 - 19)  SpO2: 98% (06 Aug 2017 06:10) (95% - 98%)      PHYSICAL EXAM:  · CONSTITUTIONAL:	Well-developed, well nourished    BMI-29  · EYES:	EOMI; PERRL; no drainage or redness  · ENMT:	No oral lesions; no gross abnormalities  · NECK:	No bruits; no thyromegaly or nodules  ·BACK:	No deformity or limitation of movement  ·RESPIRATORY:   Airway patent; breath sounds equal; good air movement; respirations non-labored; clear to auscultation bilaterally; no chest wall tenderness; no intercostal retractions; no rales,rhonchi or wheeze  · CARDIOVASCULAR	Irregular irregular rhythm  no rub  2/6 systolic murmur  normal PMI  . GASTROINTESTINAL:  no distention; no masses palpable; bowel sounds normal; no rebound tenderness; no guarding; no rigidity; no organomegaly  · EXTREMITIES: No cyanosis, clubbing or edema  · VASCULAR: 	Equal and normal pulses (carotid, femoral, dorsalis pedis)  ·NEUROLOGICAL:   Alert and oriented x 3; sensation intact; deep reflexes intact; cranial nerves intact; no spontaneous movement; superficial reflexes intact; normal strength  · SKIN:	No lesions; no rash  . LYMPH NODES:	No lymphadedenopathy  · MUSCULOSKELETAL:   No calf tenderness  no joint swelling.  	  TELEMETRY:Atrial Fibrillation rate 70-90's      LABS:                        11.3   4.61  )-----------( 234      ( 05 Aug 2017 03:47 )             37.6     08-05    132<L>  |  98  |  17  ----------------------------<  128<H>  4.4   |  14<L>  |  1.18    Ca    8.9      05 Aug 2017 03:47  Mg     1.8     08-05    PT/INR - ( 05 Aug 2017 15:17 )   PT: 17.4 SEC;   INR: 1.54     PTT - ( 05 Aug 2017 22:40 )  PTT:88.6 SEC    I&O's Summary    04 Aug 2017 07:01  -  05 Aug 2017 07:00  --------------------------------------------------------  IN: 969 mL / OUT: 300 mL / NET: 669 mL    05 Aug 2017 07:01  -  06 Aug 2017 06:36  --------------------------------------------------------  IN: 580 mL / OUT: 300 mL / NET: 280 mL    IMPRESSION AND PLAN:    Jazmín Patterson 75 yo female w/ PMHx HFrEF, Afib on Coumadin, DMT2, vertigo admitted with Acute on Chronic Systolic Heart failure,had cath-Non obstructive CAD,noted Moderate Mitral regurgitation on TTE,had JULIANO-mild-moderate MR,severe global LV dysfunction-increase Metoprolol for AF rate control,goal INR 2-2.5.    PT assessment,discharge planning

## 2017-08-07 DIAGNOSIS — I10 ESSENTIAL (PRIMARY) HYPERTENSION: ICD-10-CM

## 2017-08-07 DIAGNOSIS — I50.9 HEART FAILURE, UNSPECIFIED: ICD-10-CM

## 2017-08-07 DIAGNOSIS — J11.1 INFLUENZA DUE TO UNIDENTIFIED INFLUENZA VIRUS WITH OTHER RESPIRATORY MANIFESTATIONS: ICD-10-CM

## 2017-08-07 LAB
APTT BLD: 77.4 SEC — HIGH (ref 27.5–37.4)
BACTERIA UR CULT: SIGNIFICANT CHANGE UP
BUN SERPL-MCNC: 28 MG/DL — HIGH (ref 7–23)
CALCIUM SERPL-MCNC: 8.5 MG/DL — SIGNIFICANT CHANGE UP (ref 8.4–10.5)
CHLORIDE SERPL-SCNC: 95 MMOL/L — LOW (ref 98–107)
CO2 SERPL-SCNC: 23 MMOL/L — SIGNIFICANT CHANGE UP (ref 22–31)
CREAT SERPL-MCNC: 1.26 MG/DL — SIGNIFICANT CHANGE UP (ref 0.5–1.3)
GLUCOSE SERPL-MCNC: 188 MG/DL — HIGH (ref 70–99)
HCT VFR BLD CALC: 34.8 % — SIGNIFICANT CHANGE UP (ref 34.5–45)
HGB BLD-MCNC: 10.7 G/DL — LOW (ref 11.5–15.5)
INR BLD: 1.39 — HIGH (ref 0.88–1.17)
MAGNESIUM SERPL-MCNC: 1.7 MG/DL — SIGNIFICANT CHANGE UP (ref 1.6–2.6)
MCHC RBC-ENTMCNC: 20.2 PG — LOW (ref 27–34)
MCHC RBC-ENTMCNC: 30.7 % — LOW (ref 32–36)
MCV RBC AUTO: 65.7 FL — LOW (ref 80–100)
NRBC # FLD: 0 — SIGNIFICANT CHANGE UP
PLATELET # BLD AUTO: 230 K/UL — SIGNIFICANT CHANGE UP (ref 150–400)
PMV BLD: 10.4 FL — SIGNIFICANT CHANGE UP (ref 7–13)
POTASSIUM SERPL-MCNC: 3.7 MMOL/L — SIGNIFICANT CHANGE UP (ref 3.5–5.3)
POTASSIUM SERPL-SCNC: 3.7 MMOL/L — SIGNIFICANT CHANGE UP (ref 3.5–5.3)
PROTHROM AB SERPL-ACNC: 15.7 SEC — HIGH (ref 9.8–13.1)
RBC # BLD: 5.3 M/UL — HIGH (ref 3.8–5.2)
RBC # FLD: 17 % — HIGH (ref 10.3–14.5)
SODIUM SERPL-SCNC: 134 MMOL/L — LOW (ref 135–145)
SPECIMEN SOURCE: SIGNIFICANT CHANGE UP
WBC # BLD: 3.44 K/UL — LOW (ref 3.8–10.5)
WBC # FLD AUTO: 3.44 K/UL — LOW (ref 3.8–10.5)

## 2017-08-07 RX ORDER — WARFARIN SODIUM 2.5 MG/1
6 TABLET ORAL ONCE
Qty: 0 | Refills: 0 | Status: COMPLETED | OUTPATIENT
Start: 2017-08-07 | End: 2017-08-07

## 2017-08-07 RX ORDER — POTASSIUM CHLORIDE 20 MEQ
10 PACKET (EA) ORAL
Qty: 0 | Refills: 0 | Status: COMPLETED | OUTPATIENT
Start: 2017-08-07 | End: 2017-08-07

## 2017-08-07 RX ORDER — MAGNESIUM SULFATE 500 MG/ML
1 VIAL (ML) INJECTION ONCE
Qty: 0 | Refills: 0 | Status: COMPLETED | OUTPATIENT
Start: 2017-08-07 | End: 2017-08-07

## 2017-08-07 RX ADMIN — Medication 25 MILLIGRAM(S): at 21:15

## 2017-08-07 RX ADMIN — SPIRONOLACTONE 25 MILLIGRAM(S): 25 TABLET, FILM COATED ORAL at 06:12

## 2017-08-07 RX ADMIN — Medication 40 MILLIGRAM(S): at 06:12

## 2017-08-07 RX ADMIN — Medication 75 MILLIGRAM(S): at 17:34

## 2017-08-07 RX ADMIN — Medication 25 MILLIGRAM(S): at 13:48

## 2017-08-07 RX ADMIN — WARFARIN SODIUM 6 MILLIGRAM(S): 2.5 TABLET ORAL at 17:34

## 2017-08-07 RX ADMIN — Medication 0.12 MILLIGRAM(S): at 06:12

## 2017-08-07 RX ADMIN — Medication 100 MILLIEQUIVALENT(S): at 17:33

## 2017-08-07 RX ADMIN — Medication 1: at 17:34

## 2017-08-07 RX ADMIN — ATORVASTATIN CALCIUM 40 MILLIGRAM(S): 80 TABLET, FILM COATED ORAL at 21:15

## 2017-08-07 RX ADMIN — HEPARIN SODIUM 700 UNIT(S)/HR: 5000 INJECTION INTRAVENOUS; SUBCUTANEOUS at 18:28

## 2017-08-07 RX ADMIN — Medication 81 MILLIGRAM(S): at 13:48

## 2017-08-07 RX ADMIN — Medication 25 MILLIGRAM(S): at 06:12

## 2017-08-07 RX ADMIN — LISINOPRIL 5 MILLIGRAM(S): 2.5 TABLET ORAL at 06:12

## 2017-08-07 RX ADMIN — Medication 100 GRAM(S): at 17:33

## 2017-08-07 RX ADMIN — Medication 50 MILLIGRAM(S): at 17:34

## 2017-08-07 RX ADMIN — Medication 75 MILLIGRAM(S): at 06:12

## 2017-08-07 RX ADMIN — Medication 50 MILLIGRAM(S): at 06:12

## 2017-08-07 RX ADMIN — Medication 1: at 13:48

## 2017-08-07 NOTE — DIETITIAN INITIAL EVALUATION ADULT. - NS AS NUTRI INTERV MEALS SNACK
Diets modified for specific foods and ingredients/Other (specify)/1. Suggest: PO diet rx: Regular, Consistent Carbohydrate (no snacks), DASH/TLC (cholesterol and sodium restricted), Low Fat; PO supplement: Glucerna Shake 8oz. 1x daily (will provide additional ~220kcals, ~10g protein);                 2. Encourage & assist Pt with meals; Monitor PO diet tolerance;                3. Monitor labs, weights, hydration status;

## 2017-08-07 NOTE — DIETITIAN INITIAL EVALUATION ADULT. - DIET TYPE
regular/consistent carbohydrate (evening snack)/DASH/TLC (sodium and cholesterol restricted diet)/low fat/no concentrated potassium

## 2017-08-07 NOTE — CHART NOTE - NSCHARTNOTEFT_GEN_A_CORE
patient with 18 beats NSVT on telemetry , asymptomatic   Check vitals  electrolytes repleted   consider increase Bblocker

## 2017-08-07 NOTE — DIETITIAN INITIAL EVALUATION ADULT. - OTHER INFO
Nutrition Consult X Assessment, Education. Pt 77 yo female with Heart Failure, transferred from formerly [facility] for cardiac cath. Pt appears alert, oriented. Per Pt her appetite not that good, but better than before. Per tray waste observation Pt ate ~50% of breakfast today. Pt also stated, "I'm never been a big eater." Food preferences discussed. Per Pt her weight (PTA): 180#. Pt probably gained weight, but unable to quantify.  At home Pt usually avoids Sugar & Salt reported. Pt's diet order includes Consistent Carbohydrate, DASH/TLC (cholesterol and sodium restricted), Low Fat restrictions. Pt declined diet education on prescribed diet when was offered by RDN. No other food related concern voiced. Case discussed with Tele PA. RDN remains available, Pt made aware.

## 2017-08-07 NOTE — PROGRESS NOTE ADULT - SUBJECTIVE AND OBJECTIVE BOX
SUBJ:75 yo female w/ PMHx HFrEF, Atrial Fibrillation CHADS2-5 on Coumadin, DMT2, vertigo admitted with c/o palpitations accompanied by dyspnea on exertion.also refers to bilateral leg edema ,notes orthopnea and paroxysmal nocturnal dyspnea.Had cardiac cath-Non obstructive CAD,JULIANO-mild -moderate MR,remains in atrial fibrillation rate better controlled    pt denies chest pain, shortness of breath, feels intermittent chest congestion       MEDICATIONS  (STANDING):  heparin  Infusion.  Unit(s)/Hr (14 mL/Hr) IV Continuous <Continuous>  aspirin  chewable 81 milliGRAM(s) Oral daily  lisinopril 5 milliGRAM(s) Oral daily  digoxin     Tablet 0.125 milliGRAM(s) Oral daily  meclizine 25 milliGRAM(s) Oral three times a day  atorvastatin 40 milliGRAM(s) Oral at bedtime  insulin lispro (HumaLOG) corrective regimen sliding scale   SubCutaneous three times a day before meals  dextrose 5%. 1000 milliLiter(s) (50 mL/Hr) IV Continuous <Continuous>  dextrose 50% Injectable 12.5 Gram(s) IV Push once  dextrose 50% Injectable 25 Gram(s) IV Push once  dextrose 50% Injectable 25 Gram(s) IV Push once  metoprolol 50 milliGRAM(s) Oral two times a day  spironolactone 25 milliGRAM(s) Oral daily  furosemide    Tablet 40 milliGRAM(s) Oral daily  oseltamivir 75 milliGRAM(s) Oral every 12 hours    MEDICATIONS  (PRN):  heparin  Injectable 6500 Unit(s) IV Push every 6 hours PRN For aPTT less than 40  heparin  Injectable 3000 Unit(s) IV Push every 6 hours PRN For aPTT between 40 - 57  simethicone 80 milliGRAM(s) Chew every 6 hours PRN Gas  dextrose Gel 1 Dose(s) Oral once PRN Blood Glucose LESS THAN 70 milliGRAM(s)/deciliter  glucagon  Injectable 1 milliGRAM(s) IntraMuscular once PRN Glucose LESS THAN 70 milligrams/deciliter  benzocaine 15 mG/menthol 3.6 mG Lozenge 1 Lozenge Oral three times a day PRN Sore Throat  acetaminophen   Tablet 650 milliGRAM(s) Oral every 6 hours PRN For Temp greater than 38 C (100.4 F)    Vital Signs Last 24 Hrs  T(C): 37.2 (07 Aug 2017 22:12), Max: 37.2 (07 Aug 2017 13:52)  T(F): 98.9 (07 Aug 2017 22:12), Max: 99 (07 Aug 2017 13:52)  HR: 61 (07 Aug 2017 22:12) (61 - 98)  BP: 106/59 (07 Aug 2017 22:12) (103/66 - 118/62)  BP(mean): --  RR: 16 (07 Aug 2017 22:12) (16 - 18)  SpO2: 98% (07 Aug 2017 22:12) (98% - 98%)      PHYSICAL EXAM:  · CONSTITUTIONAL:	Well-developed, well nourished    BMI-29  · EYES:	EOMI; PERRL; no drainage or redness  · ENMT:	No oral lesions; no gross abnormalities  · NECK:	No bruits; no thyromegaly or nodules  ·BACK:	No deformity or limitation of movement  ·RESPIRATORY:   Airway patent; breath sounds equal; good air movement; respirations non-labored; clear to auscultation bilaterally; no chest wall tenderness; no intercostal retractions; no rales,rhonchi or wheeze  · CARDIOVASCULAR	Irregular irregular rhythm  no rub  2/6 systolic murmur  normal PMI  . GASTROINTESTINAL:  no distention; no masses palpable; bowel sounds normal; no rebound tenderness; no guarding; no rigidity; no organomegaly  · EXTREMITIES: No cyanosis, clubbing or edema  · VASCULAR: 	Equal and normal pulses (carotid, femoral, dorsalis pedis)  ·NEUROLOGICAL:   Alert and oriented x 3; sensation intact; deep reflexes intact; cranial nerves intact; no spontaneous movement; superficial reflexes intact; normal strength  · SKIN:	No lesions; no rash  . LYMPH NODES:	No lymphadedenopathy  · MUSCULOSKELETAL:   No calf tenderness  no joint swelling.  	  TELEMETRY:Atrial Fibrillation rate 70-90's      LABS:      134<L>  |  95<L>  |  28<H>  ----------------------------<  188<H>  3.7   |  23  |  1.26    Ca    8.5      07 Aug 2017 05:40  Mg     1.7           Creatinine Trend: 1.26 <--, 1.34 <--, 1.18 <--, 1.16 <--, 1.14 <--, 1.29 <--, 1.21 <--                        10.7   3.44  )-----------( 230      ( 07 Aug 2017 05:40 )             34.8     Urine Studies:  Urinalysis Basic - ( 06 Aug 2017 07:46 )    Color: YELLOW / Appearance: CLEAR / S.011 / pH: 6.0  Gluc: NEGATIVE / Ketone: NEGATIVE  / Bili: NEGATIVE / Urobili: NORMAL E.U.   Blood: NEGATIVE / Protein: 20 / Nitrite: NEGATIVE   Leuk Esterase: NEGATIVE / RBC: 0-2 / WBC 2-5   Sq Epi:  / Non Sq Epi:  / Bacteria:                 PT/INR - ( 07 Aug 2017 07:55 )   PT: 15.7 SEC;   INR: 1.39          PTT - ( 07 Aug 2017 07:55 )  PTT:77.4 SEC

## 2017-08-08 LAB
BUN SERPL-MCNC: 31 MG/DL — HIGH (ref 7–23)
CALCIUM SERPL-MCNC: 8.7 MG/DL — SIGNIFICANT CHANGE UP (ref 8.4–10.5)
CHLORIDE SERPL-SCNC: 101 MMOL/L — SIGNIFICANT CHANGE UP (ref 98–107)
CO2 SERPL-SCNC: 14 MMOL/L — LOW (ref 22–31)
CREAT SERPL-MCNC: 1.22 MG/DL — SIGNIFICANT CHANGE UP (ref 0.5–1.3)
GLUCOSE SERPL-MCNC: 85 MG/DL — SIGNIFICANT CHANGE UP (ref 70–99)
HCT VFR BLD CALC: 37.2 % — SIGNIFICANT CHANGE UP (ref 34.5–45)
HGB BLD-MCNC: 11.1 G/DL — LOW (ref 11.5–15.5)
INR BLD: 1.64 — HIGH (ref 0.88–1.17)
MAGNESIUM SERPL-MCNC: 2.1 MG/DL — SIGNIFICANT CHANGE UP (ref 1.6–2.6)
MCHC RBC-ENTMCNC: 20.4 PG — LOW (ref 27–34)
MCHC RBC-ENTMCNC: 29.8 % — LOW (ref 32–36)
MCV RBC AUTO: 68.3 FL — LOW (ref 80–100)
NRBC # FLD: 0 — SIGNIFICANT CHANGE UP
PLATELET # BLD AUTO: 161 K/UL — SIGNIFICANT CHANGE UP (ref 150–400)
PMV BLD: SIGNIFICANT CHANGE UP FL (ref 7–13)
POTASSIUM SERPL-MCNC: 4.5 MMOL/L — SIGNIFICANT CHANGE UP (ref 3.5–5.3)
POTASSIUM SERPL-SCNC: 4.5 MMOL/L — SIGNIFICANT CHANGE UP (ref 3.5–5.3)
PROTHROM AB SERPL-ACNC: 18.5 SEC — HIGH (ref 9.8–13.1)
RBC # BLD: 5.45 M/UL — HIGH (ref 3.8–5.2)
RBC # FLD: 18.6 % — HIGH (ref 10.3–14.5)
SODIUM SERPL-SCNC: 131 MMOL/L — LOW (ref 135–145)
WBC # BLD: 3.37 K/UL — LOW (ref 3.8–10.5)
WBC # FLD AUTO: 3.37 K/UL — LOW (ref 3.8–10.5)

## 2017-08-08 PROCEDURE — 99222 1ST HOSP IP/OBS MODERATE 55: CPT

## 2017-08-08 RX ORDER — FUROSEMIDE 40 MG
40 TABLET ORAL
Qty: 0 | Refills: 0 | Status: DISCONTINUED | OUTPATIENT
Start: 2017-08-08 | End: 2017-08-10

## 2017-08-08 RX ORDER — WARFARIN SODIUM 2.5 MG/1
7.5 TABLET ORAL ONCE
Qty: 0 | Refills: 0 | Status: COMPLETED | OUTPATIENT
Start: 2017-08-08 | End: 2017-08-08

## 2017-08-08 RX ADMIN — Medication 50 MILLIGRAM(S): at 17:07

## 2017-08-08 RX ADMIN — Medication 75 MILLIGRAM(S): at 04:51

## 2017-08-08 RX ADMIN — Medication 81 MILLIGRAM(S): at 15:52

## 2017-08-08 RX ADMIN — WARFARIN SODIUM 7.5 MILLIGRAM(S): 2.5 TABLET ORAL at 17:08

## 2017-08-08 RX ADMIN — Medication 25 MILLIGRAM(S): at 22:36

## 2017-08-08 RX ADMIN — LISINOPRIL 5 MILLIGRAM(S): 2.5 TABLET ORAL at 04:51

## 2017-08-08 RX ADMIN — Medication 25 MILLIGRAM(S): at 04:51

## 2017-08-08 RX ADMIN — Medication 25 MILLIGRAM(S): at 15:52

## 2017-08-08 RX ADMIN — SPIRONOLACTONE 25 MILLIGRAM(S): 25 TABLET, FILM COATED ORAL at 04:51

## 2017-08-08 RX ADMIN — Medication 40 MILLIGRAM(S): at 04:51

## 2017-08-08 RX ADMIN — Medication 50 MILLIGRAM(S): at 04:51

## 2017-08-08 RX ADMIN — Medication 40 MILLIGRAM(S): at 17:07

## 2017-08-08 RX ADMIN — Medication 1: at 18:21

## 2017-08-08 RX ADMIN — Medication 0.12 MILLIGRAM(S): at 04:51

## 2017-08-08 RX ADMIN — Medication 75 MILLIGRAM(S): at 17:07

## 2017-08-08 RX ADMIN — ATORVASTATIN CALCIUM 40 MILLIGRAM(S): 80 TABLET, FILM COATED ORAL at 22:36

## 2017-08-08 NOTE — PROGRESS NOTE ADULT - SUBJECTIVE AND OBJECTIVE BOX
Subjective: Patient seen and examined. No new events except as noted.     SUBJECTIVE/ROS:        MEDICATIONS:  MEDICATIONS  (STANDING):  heparin  Infusion.  Unit(s)/Hr (14 mL/Hr) IV Continuous <Continuous>  aspirin  chewable 81 milliGRAM(s) Oral daily  lisinopril 5 milliGRAM(s) Oral daily  digoxin     Tablet 0.125 milliGRAM(s) Oral daily  meclizine 25 milliGRAM(s) Oral three times a day  atorvastatin 40 milliGRAM(s) Oral at bedtime  insulin lispro (HumaLOG) corrective regimen sliding scale   SubCutaneous three times a day before meals  dextrose 5%. 1000 milliLiter(s) (50 mL/Hr) IV Continuous <Continuous>  dextrose 50% Injectable 12.5 Gram(s) IV Push once  dextrose 50% Injectable 25 Gram(s) IV Push once  dextrose 50% Injectable 25 Gram(s) IV Push once  metoprolol 50 milliGRAM(s) Oral two times a day  spironolactone 25 milliGRAM(s) Oral daily  furosemide    Tablet 40 milliGRAM(s) Oral daily  oseltamivir 75 milliGRAM(s) Oral every 12 hours      PHYSICAL EXAM:  T(C): 36.9 (08-08-17 @ 13:21), Max: 37.2 (08-07-17 @ 22:12)  HR: 76 (08-08-17 @ 13:21) (61 - 76)  BP: 111/71 (08-08-17 @ 13:21) (106/59 - 118/62)  RR: 16 (08-08-17 @ 13:21) (16 - 16)  SpO2: 99% (08-08-17 @ 13:21) (95% - 99%)  Wt(kg): --  I&O's Summary    07 Aug 2017 07:01  -  08 Aug 2017 07:00  --------------------------------------------------------  IN: 611 mL / OUT: 200 mL / NET: 411 mL    08 Aug 2017 07:01  -  08 Aug 2017 15:24  --------------------------------------------------------  IN: 600 mL / OUT: 0 mL / NET: 600 mL          Appearance: Normal	  HEENT:   Normal oral mucosa, PERRL, EOMI	  Cardiovascular: Normal S1 S2,    Murmur:   Neck: JVP elevated   Respiratory: Lungs clear to auscultation  Gastrointestinal:  Soft, Non-tender, + BS	  Skin: normal   Neuro: No gross deficits.   Psychiatry:  Mood & affect appropriate  Ext: No edema        LABS:    CARDIAC MARKERS:                                11.1   3.37  )-----------( 161      ( 08 Aug 2017 06:26 )             37.2     08-08    131<L>  |  101  |  31<H>  ----------------------------<  85  4.5   |  14<L>  |  1.22    Ca    8.7      08 Aug 2017 06:26  Mg     2.1     08-08      proBNP:   Lipid Profile:   HgA1c:   TSH:           TELEMETRY: 	    ECG:  	  RADIOLOGY:   DIAGNOSTIC TESTING:  Echocardiogram:  Catheterization:  Stress Test:    OTHER:

## 2017-08-08 NOTE — CONSULT NOTE ADULT - ASSESSMENT
76 year old female with PMH of HFrEF, chronic AFib-on Coumadin, DM and recently diagnosed  non-ischemic CM who was transferred from [facility] for acute on chronic HF.  Patient underwent JULIANO on 8/4 which showed severe LV systolic dysfunction, moderate aortic stenosis.  Echo done on 6/20/2017 showed moderately reduced LV systolic function. No prior echocardiogram done per patient.  Telemetry recorded one 18 beats likely aberrancy yesterday. ICD for primary prevention of sudden cardiac death if LVEF remains < or=35 % while on optimal medical therapy for over 3 months is recommended.   -Continue optimal medical therapy with ACEI or ARB and BB  -F/u for a repeat echo in 3 months, ICD eval if EF < or =35%  -Continue AC with warfarin for INR 2-3 for CHADS=5 76 year old female with PMH of HFrEF, chronic AFib-on Coumadin, DM and recently diagnosed  non-ischemic CM who was transferred from [facility] for acute on chronic HF.  Patient underwent JULIANO on 8/4 which showed severe LV systolic dysfunction, moderate aortic stenosis.  Echo done on 6/20/2017 showed moderately reduced LV systolic function. No prior echocardiogram done per patient.  Telemetry recorded one 18 beats NSVT yesterday and patient remained asymptomatic. ICD for primary prevention of sudden cardiac death if LVEF remains < or=35 % while on optimal medical therapy for over 3 months is recommended.   -Continue optimal medical therapy with ACEI or ARB and BB  -F/u for a repeat echo in 3 months, ICD eval if EF < or =35%  -Continue AC with warfarin for INR 2-3 for CHADS=5  -No Life Vest recommended

## 2017-08-08 NOTE — PROGRESS NOTE ADULT - SUBJECTIVE AND OBJECTIVE BOX
SUBJ:77 yo female w/ PMHx HFrEF, Atrial Fibrillation CHADS2-5 on Coumadin, DMT2, vertigo admitted with c/o palpitations accompanied by dyspnea on exertion.also refers to bilateral leg edema ,notes orthopnea and paroxysmal nocturnal dyspnea.Had cardiac cath-Non obstructive CAD,JULIANO-mild -moderate MR,remains in atrial fibrillation rate better controlled    pt denies chest pain, shortness of breath, feels intermittent chest congestion     MEDICATIONS  (STANDING):  heparin  Infusion.  Unit(s)/Hr (14 mL/Hr) IV Continuous <Continuous>  aspirin  chewable 81 milliGRAM(s) Oral daily  lisinopril 5 milliGRAM(s) Oral daily  digoxin     Tablet 0.125 milliGRAM(s) Oral daily  meclizine 25 milliGRAM(s) Oral three times a day  atorvastatin 40 milliGRAM(s) Oral at bedtime  insulin lispro (HumaLOG) corrective regimen sliding scale   SubCutaneous three times a day before meals  dextrose 5%. 1000 milliLiter(s) (50 mL/Hr) IV Continuous <Continuous>  dextrose 50% Injectable 12.5 Gram(s) IV Push once  dextrose 50% Injectable 25 Gram(s) IV Push once  dextrose 50% Injectable 25 Gram(s) IV Push once  metoprolol 50 milliGRAM(s) Oral two times a day  spironolactone 25 milliGRAM(s) Oral daily  oseltamivir 75 milliGRAM(s) Oral every 12 hours  furosemide    Tablet 40 milliGRAM(s) Oral two times a day    MEDICATIONS  (PRN):  heparin  Injectable 6500 Unit(s) IV Push every 6 hours PRN For aPTT less than 40  heparin  Injectable 3000 Unit(s) IV Push every 6 hours PRN For aPTT between 40 - 57  simethicone 80 milliGRAM(s) Chew every 6 hours PRN Gas  dextrose Gel 1 Dose(s) Oral once PRN Blood Glucose LESS THAN 70 milliGRAM(s)/deciliter  glucagon  Injectable 1 milliGRAM(s) IntraMuscular once PRN Glucose LESS THAN 70 milligrams/deciliter  benzocaine 15 mG/menthol 3.6 mG Lozenge 1 Lozenge Oral three times a day PRN Sore Throat  acetaminophen   Tablet 650 milliGRAM(s) Oral every 6 hours PRN For Temp greater than 38 C (100.4 F)      Vital Signs Last 24 Hrs  T(C): 36.3 (08 Aug 2017 22:35), Max: 36.9 (08 Aug 2017 13:21)  T(F): 97.4 (08 Aug 2017 22:35), Max: 98.4 (08 Aug 2017 13:21)  HR: 70 (08 Aug 2017 22:35) (62 - 84)  BP: 101/53 (08 Aug 2017 22:35) (101/53 - 123/64)  BP(mean): --  RR: 18 (08 Aug 2017 22:35) (16 - 18)  SpO2: 100% (08 Aug 2017 22:35) (95% - 100%)    PHYSICAL EXAM:  · CONSTITUTIONAL:	Well-developed, well nourished    BMI-29  · EYES:	EOMI; PERRL; no drainage or redness  · ENMT:	No oral lesions; no gross abnormalities  · NECK:	No bruits; no thyromegaly or nodules  ·BACK:	No deformity or limitation of movement  ·RESPIRATORY:   Airway patent; breath sounds equal; good air movement; respirations non-labored; clear to auscultation bilaterally; no chest wall tenderness; no intercostal retractions; no rales,rhonchi or wheeze  · CARDIOVASCULAR	Irregular irregular rhythm  no rub  2/6 systolic murmur  normal PMI  . GASTROINTESTINAL:  no distention; no masses palpable; bowel sounds normal; no rebound tenderness; no guarding; no rigidity; no organomegaly  · EXTREMITIES: No cyanosis, clubbing or edema  · VASCULAR: 	Equal and normal pulses (carotid, femoral, dorsalis pedis)  ·NEUROLOGICAL:   Alert and oriented x 3; sensation intact; deep reflexes intact; cranial nerves intact; no spontaneous movement; superficial reflexes intact; normal strength  · SKIN:	No lesions; no rash  . LYMPH NODES:	No lymphadedenopathy  · MUSCULOSKELETAL:   No calf tenderness  no joint swelling.  LABS:      131<L>  |  101  |  31<H>  ----------------------------<  85  4.5   |  14<L>  |  1.22    Ca    8.7      08 Aug 2017 06:26  Mg     2.1           Creatinine Trend: 1.22 <--, 1.26 <--, 1.34 <--, 1.18 <--, 1.16 <--, 1.14 <--, 1.29 <--                        11.1   3.37  )-----------( 161      ( 08 Aug 2017 06:26 )             37.2     Urine Studies:  Urinalysis Basic - ( 06 Aug 2017 07:46 )    Color: YELLOW / Appearance: CLEAR / S.011 / pH: 6.0  Gluc: NEGATIVE / Ketone: NEGATIVE  / Bili: NEGATIVE / Urobili: NORMAL E.U.   Blood: NEGATIVE / Protein: 20 / Nitrite: NEGATIVE   Leuk Esterase: NEGATIVE / RBC: 0-2 / WBC 2-5   Sq Epi:  / Non Sq Epi:  / Bacteria:                 PT/INR - ( 08 Aug 2017 11:15 )   PT: 18.5 SEC;   INR: 1.64          PTT - ( 07 Aug 2017 07:55 )  PTT:77.4 SEC

## 2017-08-08 NOTE — CONSULT NOTE ADULT - SUBJECTIVE AND OBJECTIVE BOX
Patient is a 76y old  Female who presents with a chief complaint of Tx for cardiac cath from [facility] (04 Aug 2017 15:35)          HPI:  76 year old female with PMH of HFrEF, chronic AFib-on Coumadin, DM, vertigo who was transferred from [facility] for acute on chronic HF.  Patient underwent JULIANO on 8/4 which showed severe LV systolic dysfunction, moderate aortic stenosis.  Echo done on6/20/2017 showed moderately reduced LV systolic function.  Telemetry recorded one 18 beats NSVT vs aberrancy yesterday.  Patient admits occasional palpitations but denies near syncope or syncope associated with arrhythmia episode.  She  is currently Afib with rate controlled.  One RVR up to 180 bpm recorded on 8/4.   She doesn't recall any previous echocardiogram prior to June 2017 or any f/u with a cardiologist.   She is currently on Lisinopril/BB now.   Recent LHC showed non-obstructive CAD.      PAST MEDICAL & SURGICAL HISTORY:  CHF (congestive heart failure)  Hypertension  No significant past surgical history      MEDICATIONS  (STANDING):  heparin  Infusion.  Unit(s)/Hr (14 mL/Hr) IV Continuous <Continuous>  aspirin  chewable 81 milliGRAM(s) Oral daily  lisinopril 5 milliGRAM(s) Oral daily  digoxin     Tablet 0.125 milliGRAM(s) Oral daily  meclizine 25 milliGRAM(s) Oral three times a day  atorvastatin 40 milliGRAM(s) Oral at bedtime  insulin lispro (HumaLOG) corrective regimen sliding scale   SubCutaneous three times a day before meals  dextrose 5%. 1000 milliLiter(s) (50 mL/Hr) IV Continuous <Continuous>  dextrose 50% Injectable 12.5 Gram(s) IV Push once  dextrose 50% Injectable 25 Gram(s) IV Push once  dextrose 50% Injectable 25 Gram(s) IV Push once  metoprolol 50 milliGRAM(s) Oral two times a day  spironolactone 25 milliGRAM(s) Oral daily  furosemide    Tablet 40 milliGRAM(s) Oral daily  oseltamivir 75 milliGRAM(s) Oral every 12 hours    MEDICATIONS  (PRN):  heparin  Injectable 6500 Unit(s) IV Push every 6 hours PRN For aPTT less than 40  heparin  Injectable 3000 Unit(s) IV Push every 6 hours PRN For aPTT between 40 - 57  simethicone 80 milliGRAM(s) Chew every 6 hours PRN Gas  dextrose Gel 1 Dose(s) Oral once PRN Blood Glucose LESS THAN 70 milliGRAM(s)/deciliter  glucagon  Injectable 1 milliGRAM(s) IntraMuscular once PRN Glucose LESS THAN 70 milligrams/deciliter  benzocaine 15 mG/menthol 3.6 mG Lozenge 1 Lozenge Oral three times a day PRN Sore Throat  acetaminophen   Tablet 650 milliGRAM(s) Oral every 6 hours PRN For Temp greater than 38 C (100.4 F)    Allergies    clavulanate (Unknown)  erythromycin (Hives; Rash)    Intolerances      FAMILY HISTORY:  No pertinent family history in first degree relatives      SOCIAL HISTORY:  Denies smoking; no   Alcohol  or  Drug abuse       REVIEW OF SYSTEMS:    CONSTITUTIONAL: No fever, weight loss, chills, shakes, or fatigue  EYES: No eye pain, visual disturbances, or discharge  ENMT:  No difficulty hearing, tinnitus, vertigo; No sinus or throat pain  NECK: No pain or stiffness  RESPIRATORY: No cough, wheezing, hemoptysis,   CARDIOVASCULAR: No chest pain, , dizziness, syncope, paroxysmal nocturnal dyspnea, orthopnea, or arm or leg swelling   +dyspnea,+ palpitations  GASTROINTESTINAL: No abdominal  or epigastric pain, nausea, vomiting, hematemesis, diarrhea, constipation, melena or bright red blood.  GENITOURINARY: No dysuria, nocturia, hematuria, or urinary incontinence  NEUROLOGICAL: No headaches, memory loss, slurred speech, limb weakness, loss of strength, numbness, or tremors  SKIN: No itching, burning, rashes, or lesions   LYMPH NODES: No enlarged glands  ENDOCRINE: No heat or cold intolerance, or hair loss  MUSCULOSKELETAL: No joint pain or swelling, muscle, back, or extremity pain  PSYCHIATRIC: No depression, anxiety, or difficulty sleeping  HEME/LYMPH: No easy bruising or bleeding gums  ALLERY AND IMMUNOLOGIC: No hives or rash.      Vital Signs Last 24 Hrs  T(C): 36.9 (08 Aug 2017 13:21), Max: 37.2 (07 Aug 2017 22:12)  T(F): 98.4 (08 Aug 2017 13:21), Max: 98.9 (07 Aug 2017 22:12)  HR: 76 (08 Aug 2017 13:21) (61 - 76)  BP: 111/71 (08 Aug 2017 13:21) (106/59 - 118/62)  BP(mean): --  RR: 16 (08 Aug 2017 13:21) (16 - 16)  SpO2: 99% (08 Aug 2017 13:21) (95% - 99%)    PHYSICAL EXAM:    GENERAL: In no apparent distress, well nourished, and hydrated.  HEAD:  Atraumatic, Normocephalic  NECK: Supple . No JVD or carotid bruit or thyroidmegaly.  Carotid pulse is 2+ bilaterally.  PULMONARY: Clear to auscultation and perfusion.  No rales, wheezing, or rhonchi bilaterally.  HEART: Regular rate and rhythm; No murmurs, rubs, or gallops.  ABDOMEN: Soft, Nontender, Nondistended; Bowel sounds present  EXTREMITIES: No clubbing, cyanosis, or edema  NEUROLOGICAL: Alert oriented to person, place and time.  Speech clear.  Skin: Dry intact, no rashes or lesions.          INTERPRETATION OF TELEMETRY:  Afib with HR 80-90; pauses up to 2.5 sec; occasional PVC's; one 18 beats aberrancy vs NSVT on8/7     ECG: AFib 95; LVH; TWI; QT/QTc 412/517ms        LABS:                        11.1   3.37  )-----------( 161      ( 08 Aug 2017 06:26 )             37.2     08-08    131<L>  |  101  |  31<H>  ----------------------------<  85  4.5   |  14<L>  |  1.22    Ca    8.7      08 Aug 2017 06:26  Mg     2.1     08-08          PT/INR - ( 08 Aug 2017 11:15 )   PT: 18.5 SEC;   INR: 1.64          PTT - ( 07 Aug 2017 07:55 )  PTT:77.4 SEC      BNP  RADIOLOGY & ADDITIONAL STUDIES:  PREVIOUS DIAGNOSTIC TESTING:      ECHO FINDINGS:  CONCLUSIONS:  1. Mitral annular calcification, tethered mitral valve  leafletes. Mild mitral regurgitation which increased to  moderate with increase in atrial fibrillation rates briefly  to about 140 bpm.  2. Calcified trileaflet aortic valve with decreased  opening. Peak transaortic valve gradient equals 16 mm Hg,  mean transaortic valve gradient equals 8 mm Hg, estimated  aortic valve area equals 1.1 sqcm (by continuity equation),  consistent with moderate aortic stenosis.  3. Left atrial enlargement. Spontaneous echo contrast seen  in the left atrial appendage. No thrombus visualized.  Decreased left atrial appendage velocities.  4. Severe global left ventricular systolic dysfunction.  5. Normal right ventricular size with decreased right  ventricular systolic function.  6. Estimated pulmonary artery systolic pressure equals 44  mm Hg, assuming right atrial pressure equals 10  mm Hg,  consistent with mild pulmonary hypertension.  7. Agitated saline injection demonstrates no evidence of a  patent foramen ovale.  ------------------------------------------------------------------------  Confirmed on  8/4/2017 - 13:49:06 by Gutierrez Rivas M.D. RPVI  ------------------------------------------------------------------------    STRESS FINDINGS:    CATHETERIZATION FINDINGS: Patient is a 76y old  Female who presents with a chief complaint of Tx for cardiac cath from [facility] (04 Aug 2017 15:35)          HPI:  76 year old female with PMH of HFrEF, chronic AFib-on Coumadin, DM, vertigo who was transferred from [facility] for acute on chronic HF.  Patient underwent JULIANO on 8/4 which showed severe LV systolic dysfunction, moderate aortic stenosis.  Echo done on6/20/2017 showed moderately reduced LV systolic function.  Telemetry recorded one 18 beats NSVT yesterday.  Patient admits occasional palpitations but denies near syncope or syncope associated with arrhythmia episode.  She  is currently Afib with rate controlled.  One RVR up to 180 bpm recorded on 8/4.   She doesn't recall any previous echocardiogram prior to June 2017 or any f/u with a cardiologist.   She is currently on Lisinopril/BB now.   Recent LHC showed non-obstructive CAD.      PAST MEDICAL & SURGICAL HISTORY:  CHF (congestive heart failure)  Hypertension  No significant past surgical history      MEDICATIONS  (STANDING):  heparin  Infusion.  Unit(s)/Hr (14 mL/Hr) IV Continuous <Continuous>  aspirin  chewable 81 milliGRAM(s) Oral daily  lisinopril 5 milliGRAM(s) Oral daily  digoxin     Tablet 0.125 milliGRAM(s) Oral daily  meclizine 25 milliGRAM(s) Oral three times a day  atorvastatin 40 milliGRAM(s) Oral at bedtime  insulin lispro (HumaLOG) corrective regimen sliding scale   SubCutaneous three times a day before meals  dextrose 5%. 1000 milliLiter(s) (50 mL/Hr) IV Continuous <Continuous>  dextrose 50% Injectable 12.5 Gram(s) IV Push once  dextrose 50% Injectable 25 Gram(s) IV Push once  dextrose 50% Injectable 25 Gram(s) IV Push once  metoprolol 50 milliGRAM(s) Oral two times a day  spironolactone 25 milliGRAM(s) Oral daily  furosemide    Tablet 40 milliGRAM(s) Oral daily  oseltamivir 75 milliGRAM(s) Oral every 12 hours    MEDICATIONS  (PRN):  heparin  Injectable 6500 Unit(s) IV Push every 6 hours PRN For aPTT less than 40  heparin  Injectable 3000 Unit(s) IV Push every 6 hours PRN For aPTT between 40 - 57  simethicone 80 milliGRAM(s) Chew every 6 hours PRN Gas  dextrose Gel 1 Dose(s) Oral once PRN Blood Glucose LESS THAN 70 milliGRAM(s)/deciliter  glucagon  Injectable 1 milliGRAM(s) IntraMuscular once PRN Glucose LESS THAN 70 milligrams/deciliter  benzocaine 15 mG/menthol 3.6 mG Lozenge 1 Lozenge Oral three times a day PRN Sore Throat  acetaminophen   Tablet 650 milliGRAM(s) Oral every 6 hours PRN For Temp greater than 38 C (100.4 F)    Allergies    clavulanate (Unknown)  erythromycin (Hives; Rash)    Intolerances      FAMILY HISTORY:  No pertinent family history in first degree relatives      SOCIAL HISTORY:  Denies smoking; no   Alcohol  or  Drug abuse       REVIEW OF SYSTEMS:    CONSTITUTIONAL: No fever, weight loss, chills, shakes, or fatigue  EYES: No eye pain, visual disturbances, or discharge  ENMT:  No difficulty hearing, tinnitus, vertigo; No sinus or throat pain  NECK: No pain or stiffness  RESPIRATORY: No cough, wheezing, hemoptysis,   CARDIOVASCULAR: No chest pain, , dizziness, syncope, paroxysmal nocturnal dyspnea, orthopnea, or arm or leg swelling   +dyspnea,+ palpitations  GASTROINTESTINAL: No abdominal  or epigastric pain, nausea, vomiting, hematemesis, diarrhea, constipation, melena or bright red blood.  GENITOURINARY: No dysuria, nocturia, hematuria, or urinary incontinence  NEUROLOGICAL: No headaches, memory loss, slurred speech, limb weakness, loss of strength, numbness, or tremors  SKIN: No itching, burning, rashes, or lesions   LYMPH NODES: No enlarged glands  ENDOCRINE: No heat or cold intolerance, or hair loss  MUSCULOSKELETAL: No joint pain or swelling, muscle, back, or extremity pain  PSYCHIATRIC: No depression, anxiety, or difficulty sleeping  HEME/LYMPH: No easy bruising or bleeding gums  ALLERY AND IMMUNOLOGIC: No hives or rash.      Vital Signs Last 24 Hrs  T(C): 36.9 (08 Aug 2017 13:21), Max: 37.2 (07 Aug 2017 22:12)  T(F): 98.4 (08 Aug 2017 13:21), Max: 98.9 (07 Aug 2017 22:12)  HR: 76 (08 Aug 2017 13:21) (61 - 76)  BP: 111/71 (08 Aug 2017 13:21) (106/59 - 118/62)  BP(mean): --  RR: 16 (08 Aug 2017 13:21) (16 - 16)  SpO2: 99% (08 Aug 2017 13:21) (95% - 99%)    PHYSICAL EXAM:    GENERAL: In no apparent distress, well nourished, and hydrated.  HEAD:  Atraumatic, Normocephalic  NECK: Supple . No JVD or carotid bruit or thyroidmegaly.  Carotid pulse is 2+ bilaterally.  PULMONARY: Clear to auscultation and perfusion.  No rales, wheezing, or rhonchi bilaterally.  HEART: Regular rate and rhythm; No murmurs, rubs, or gallops.  ABDOMEN: Soft, Nontender, Nondistended; Bowel sounds present  EXTREMITIES: No clubbing, cyanosis, or edema  NEUROLOGICAL: Alert oriented to person, place and time.  Speech clear.  Skin: Dry intact, no rashes or lesions.          INTERPRETATION OF TELEMETRY:  Afib with HR 80-90; pauses up to 2.5 sec; occasional PVC's; one 18 beats aberrancy vs NSVT on8/7     ECG: AFib 95; LVH; TWI; QT/QTc 412/517ms        LABS:                        11.1   3.37  )-----------( 161      ( 08 Aug 2017 06:26 )             37.2     08-08    131<L>  |  101  |  31<H>  ----------------------------<  85  4.5   |  14<L>  |  1.22    Ca    8.7      08 Aug 2017 06:26  Mg     2.1     08-08          PT/INR - ( 08 Aug 2017 11:15 )   PT: 18.5 SEC;   INR: 1.64          PTT - ( 07 Aug 2017 07:55 )  PTT:77.4 SEC      BNP  RADIOLOGY & ADDITIONAL STUDIES:  PREVIOUS DIAGNOSTIC TESTING:      ECHO FINDINGS:  CONCLUSIONS:  1. Mitral annular calcification, tethered mitral valve  leafletes. Mild mitral regurgitation which increased to  moderate with increase in atrial fibrillation rates briefly  to about 140 bpm.  2. Calcified trileaflet aortic valve with decreased  opening. Peak transaortic valve gradient equals 16 mm Hg,  mean transaortic valve gradient equals 8 mm Hg, estimated  aortic valve area equals 1.1 sqcm (by continuity equation),  consistent with moderate aortic stenosis.  3. Left atrial enlargement. Spontaneous echo contrast seen  in the left atrial appendage. No thrombus visualized.  Decreased left atrial appendage velocities.  4. Severe global left ventricular systolic dysfunction.  5. Normal right ventricular size with decreased right  ventricular systolic function.  6. Estimated pulmonary artery systolic pressure equals 44  mm Hg, assuming right atrial pressure equals 10  mm Hg,  consistent with mild pulmonary hypertension.  7. Agitated saline injection demonstrates no evidence of a  patent foramen ovale.  ------------------------------------------------------------------------  Confirmed on  8/4/2017 - 13:49:06 by DAGOBERTO Daniels  ------------------------------------------------------------------------    STRESS FINDINGS:    CATHETERIZATION FINDINGS:

## 2017-08-09 DIAGNOSIS — E11.9 TYPE 2 DIABETES MELLITUS WITHOUT COMPLICATIONS: ICD-10-CM

## 2017-08-09 DIAGNOSIS — I48.91 UNSPECIFIED ATRIAL FIBRILLATION: ICD-10-CM

## 2017-08-09 LAB
BUN SERPL-MCNC: 25 MG/DL — HIGH (ref 7–23)
CALCIUM SERPL-MCNC: 9 MG/DL — SIGNIFICANT CHANGE UP (ref 8.4–10.5)
CHLORIDE SERPL-SCNC: 100 MMOL/L — SIGNIFICANT CHANGE UP (ref 98–107)
CO2 SERPL-SCNC: 22 MMOL/L — SIGNIFICANT CHANGE UP (ref 22–31)
CREAT SERPL-MCNC: 1.01 MG/DL — SIGNIFICANT CHANGE UP (ref 0.5–1.3)
GLUCOSE SERPL-MCNC: 100 MG/DL — HIGH (ref 70–99)
HCT VFR BLD CALC: 36.3 % — SIGNIFICANT CHANGE UP (ref 34.5–45)
HGB BLD-MCNC: 11.2 G/DL — LOW (ref 11.5–15.5)
INR BLD: 2.3 — HIGH (ref 0.88–1.17)
MCHC RBC-ENTMCNC: 20.5 PG — LOW (ref 27–34)
MCHC RBC-ENTMCNC: 30.9 % — LOW (ref 32–36)
MCV RBC AUTO: 66.5 FL — LOW (ref 80–100)
NRBC # FLD: 0 — SIGNIFICANT CHANGE UP
PLATELET # BLD AUTO: 214 K/UL — SIGNIFICANT CHANGE UP (ref 150–400)
PMV BLD: 10.5 FL — SIGNIFICANT CHANGE UP (ref 7–13)
POTASSIUM SERPL-MCNC: 4.3 MMOL/L — SIGNIFICANT CHANGE UP (ref 3.5–5.3)
POTASSIUM SERPL-SCNC: 4.3 MMOL/L — SIGNIFICANT CHANGE UP (ref 3.5–5.3)
PROTHROM AB SERPL-ACNC: 26.2 SEC — HIGH (ref 9.8–13.1)
RBC # BLD: 5.46 M/UL — HIGH (ref 3.8–5.2)
RBC # FLD: 17.9 % — HIGH (ref 10.3–14.5)
SODIUM SERPL-SCNC: 137 MMOL/L — SIGNIFICANT CHANGE UP (ref 135–145)
WBC # BLD: 3.84 K/UL — SIGNIFICANT CHANGE UP (ref 3.8–10.5)
WBC # FLD AUTO: 3.84 K/UL — SIGNIFICANT CHANGE UP (ref 3.8–10.5)

## 2017-08-09 PROCEDURE — 99232 SBSQ HOSP IP/OBS MODERATE 35: CPT

## 2017-08-09 RX ORDER — WARFARIN SODIUM 2.5 MG/1
4 TABLET ORAL ONCE
Qty: 0 | Refills: 0 | Status: COMPLETED | OUTPATIENT
Start: 2017-08-09 | End: 2017-08-09

## 2017-08-09 RX ADMIN — Medication 50 MILLIGRAM(S): at 05:44

## 2017-08-09 RX ADMIN — Medication 50 MILLIGRAM(S): at 17:52

## 2017-08-09 RX ADMIN — Medication 1: at 13:04

## 2017-08-09 RX ADMIN — SPIRONOLACTONE 25 MILLIGRAM(S): 25 TABLET, FILM COATED ORAL at 05:44

## 2017-08-09 RX ADMIN — Medication 25 MILLIGRAM(S): at 21:40

## 2017-08-09 RX ADMIN — WARFARIN SODIUM 4 MILLIGRAM(S): 2.5 TABLET ORAL at 17:52

## 2017-08-09 RX ADMIN — ATORVASTATIN CALCIUM 40 MILLIGRAM(S): 80 TABLET, FILM COATED ORAL at 21:40

## 2017-08-09 RX ADMIN — Medication 75 MILLIGRAM(S): at 17:52

## 2017-08-09 RX ADMIN — Medication 81 MILLIGRAM(S): at 13:04

## 2017-08-09 RX ADMIN — LISINOPRIL 5 MILLIGRAM(S): 2.5 TABLET ORAL at 05:44

## 2017-08-09 RX ADMIN — Medication 25 MILLIGRAM(S): at 13:04

## 2017-08-09 RX ADMIN — Medication 0.12 MILLIGRAM(S): at 05:44

## 2017-08-09 RX ADMIN — Medication 40 MILLIGRAM(S): at 17:52

## 2017-08-09 RX ADMIN — Medication 40 MILLIGRAM(S): at 05:44

## 2017-08-09 RX ADMIN — Medication 75 MILLIGRAM(S): at 05:44

## 2017-08-09 RX ADMIN — Medication 25 MILLIGRAM(S): at 05:44

## 2017-08-09 NOTE — PROGRESS NOTE ADULT - SUBJECTIVE AND OBJECTIVE BOX
Subjective: Patient seen and examined. No new events except as noted.     SUBJECTIVE/ROS:  No chest pain, or sob.    feels better      MEDICATIONS:  MEDICATIONS  (STANDING):  aspirin  chewable 81 milliGRAM(s) Oral daily  lisinopril 5 milliGRAM(s) Oral daily  digoxin     Tablet 0.125 milliGRAM(s) Oral daily  meclizine 25 milliGRAM(s) Oral three times a day  atorvastatin 40 milliGRAM(s) Oral at bedtime  insulin lispro (HumaLOG) corrective regimen sliding scale   SubCutaneous three times a day before meals  dextrose 5%. 1000 milliLiter(s) (50 mL/Hr) IV Continuous <Continuous>  dextrose 50% Injectable 12.5 Gram(s) IV Push once  dextrose 50% Injectable 25 Gram(s) IV Push once  dextrose 50% Injectable 25 Gram(s) IV Push once  metoprolol 50 milliGRAM(s) Oral two times a day  spironolactone 25 milliGRAM(s) Oral daily  oseltamivir 75 milliGRAM(s) Oral every 12 hours  furosemide    Tablet 40 milliGRAM(s) Oral two times a day  warfarin 4 milliGRAM(s) Oral once      PHYSICAL EXAM:  T(C): 36.4 (08-09-17 @ 13:46), Max: 36.9 (08-09-17 @ 05:42)  HR: 64 (08-09-17 @ 13:46) (64 - 84)  BP: 102/60 (08-09-17 @ 13:46) (101/53 - 131/70)  RR: 18 (08-09-17 @ 13:46) (18 - 18)  SpO2: 100% (08-09-17 @ 13:46) (99% - 100%)  Wt(kg): --  I&O's Summary    08 Aug 2017 07:01  -  09 Aug 2017 07:00  --------------------------------------------------------  IN: 877 mL / OUT: 0 mL / NET: 877 mL    09 Aug 2017 07:01  -  09 Aug 2017 14:25  --------------------------------------------------------  IN: 400 mL / OUT: 100 mL / NET: 300 mL          Appearance: Normal	  HEENT:   Normal oral mucosa, PERRL, EOMI	  Cardiovascular: Normal S1 S2,    Murmur:   Neck: JVP normal  Respiratory: Lungs clear to auscultation  Gastrointestinal:  Soft, Non-tender, + BS	  Skin: normal   Neuro: No gross deficits.   Psychiatry:  Mood & affect appropriate  Ext: No edema        LABS:    CARDIAC MARKERS:                                11.2   3.84  )-----------( 214      ( 09 Aug 2017 06:45 )             36.3     08-09    137  |  100  |  25<H>  ----------------------------<  100<H>  4.3   |  22  |  1.01    Ca    9.0      09 Aug 2017 06:30  Mg     2.1     08-08      proBNP:   Lipid Profile:   HgA1c:   TSH:           TELEMETRY: 	    ECG:  	  RADIOLOGY:   DIAGNOSTIC TESTING:  Echocardiogram:  Catheterization:  Stress Test:    OTHER:

## 2017-08-09 NOTE — PROGRESS NOTE ADULT - SUBJECTIVE AND OBJECTIVE BOX
Subjective: No current complaints. Denies chest pain, SOB, PND, orthopnea, palpitations, diaphoresis, lightheadedness, dizziness, syncope, fever, chills, malaise, myalgias, weight changes, night sweats, abd pain, nausea, vomiting, constipation, diarrhea, BRBPR, melena, urinary symptoms, HA, visual changes, paraesthesias, numbeness, weakness, paralysis, ataxia, dysarthria.     Physical Exam  Vital Signs Last 24 Hrs  T(C): 36.9 (09 Aug 2017 05:42), Max: 36.9 (08 Aug 2017 13:21)  T(F): 98.4 (09 Aug 2017 05:42), Max: 98.4 (08 Aug 2017 13:21)  HR: 77 (09 Aug 2017 05:42) (70 - 84)  BP: 131/70 (09 Aug 2017 05:42) (101/53 - 131/70)  RR: 18 (09 Aug 2017 05:42) (16 - 18)  SpO2: 100% (09 Aug 2017 05:42) (99% - 100%)  Appearance: Normal appearing female resting comfortably in no acute distress	  HEENT: Normal oral mucosa, No oral lesions or exudates, PERRL, EOMI	  Lymphatic: No lymphadenopathy  Cardiovascular: Normal S1 S2, No JVD, No murmurs, No edema  Respiratory: Lungs clear to auscultation, No accessory muscle use or cyanosis.   Psychiatry: A & O x 3, Mood & affect appropriate  Gastrointestinal: Soft, Nontender, Nondistended, + BS in all 4 quadrants. No rebound tenderness and no guarding   Skin: No rashes, No ecchymoses, No cyanosis  Neurologic: Non-focal  Extremities: Normal range of motion, No clubbing, cyanosis or edema  Vascular: Peripheral pulses palpable 2+ bilaterally   	  LABS             11.2<L> [11.5 - 15.5]  3.84 [3.8 - 10.5] )-----------( 214 [150 - 400]    ( 09 Aug 2017 06:45 )             36.3 [34.5 - 45.0]        137  |  100  |  25<H>  ----------------------------<  100<H>  4.3   |  22  |  1.01    Ca    9.0      09 Aug 2017 06:30  Mg     2.1     08-08      PT/INR - ( 09 Aug 2017 06:45 )   PT: 26.2 SEC;   INR: 2.30       CAPILLARY BLOOD GLUCOSE  122 (09 Aug 2017 07:58)  112 (08 Aug 2017 22:27)  176 (08 Aug 2017 17:04)  142 (08 Aug 2017 11:45)      I&O's Detail  08 Aug 2017 07:01  -  09 Aug 2017 07:00  --------------------------------------------------------  IN:    heparin  Infusion.: 77 mL    Oral Fluid: 800 mL  Total IN: 877 mL    OUT:  Total OUT: 0 mL  Total NET: 877 mL      09 Aug 2017 07:01  -  09 Aug 2017 10:35  --------------------------------------------------------  IN:  Oral Fluid: 200 mL  Total IN: 200 mL    OUT:  Voided: 100 mL  Total OUT: 100 mL  Total NET: 100 mL         Daily Weight in k (09 Aug 2017 05:42)    Allergies  clavulanate (Unknown)  erythromycin (Hives; Rash)    MEDICATIONS  (STANDING):  aspirin  chewable 81 milliGRAM(s) Oral daily  lisinopril 5 milliGRAM(s) Oral daily  digoxin     Tablet 0.125 milliGRAM(s) Oral daily  meclizine 25 milliGRAM(s) Oral three times a day  atorvastatin 40 milliGRAM(s) Oral at bedtime  insulin lispro (HumaLOG) corrective regimen sliding scale   SubCutaneous three times a day before meals  dextrose 5%. 1000 milliLiter(s) (50 mL/Hr) IV Continuous <Continuous>  dextrose 50% Injectable 12.5 Gram(s) IV Push once  dextrose 50% Injectable 25 Gram(s) IV Push once  dextrose 50% Injectable 25 Gram(s) IV Push once  metoprolol 50 milliGRAM(s) Oral two times a day  spironolactone 25 milliGRAM(s) Oral daily  oseltamivir 75 milliGRAM(s) Oral every 12 hours  furosemide    Tablet 40 milliGRAM(s) Oral two times a day    MEDICATIONS  (PRN):  simethicone 80 milliGRAM(s) Chew every 6 hours PRN Gas  dextrose Gel 1 Dose(s) Oral once PRN Blood Glucose LESS THAN 70 milliGRAM(s)/deciliter  glucagon  Injectable 1 milliGRAM(s) IntraMuscular once PRN Glucose LESS THAN 70 milligrams/deciliter  benzocaine 15 mG/menthol 3.6 mG Lozenge 1 Lozenge Oral three times a day PRN Sore Throat  acetaminophen   Tablet 650 milliGRAM(s) Oral every 6 hours PRN For Temp greater than 38 C (100.4 F) Subjective: No current complaints. Denies chest pain, SOB, PND, orthopnea, palpitations, diaphoresis, dizziness, syncope, fever, chills, malaise, night sweats, abd pain, nausea, vomiting, constipation, diarrhea, HA, numbness weakness, or paralysis.    Physical Exam  Vital Signs Last 24 Hrs  T(C): 36.9 (09 Aug 2017 05:42), Max: 36.9 (08 Aug 2017 13:21)  T(F): 98.4 (09 Aug 2017 05:42), Max: 98.4 (08 Aug 2017 13:21)  HR: 77 (09 Aug 2017 05:42) (70 - 84)  BP: 131/70 (09 Aug 2017 05:42) (101/53 - 131/70)  RR: 18 (09 Aug 2017 05:42) (16 - 18)  SpO2: 100% (09 Aug 2017 05:42) (99% - 100%)  Appearance: Normal appearing female resting comfortably in no acute distress	  HEENT: Normal oral mucosa, No oral lesions or exudates, PERRL, EOMI	  Lymphatic: No lymphadenopathy  Cardiovascular: Normal S1 S2, No JVD, No murmurs, No edema  Respiratory: Lungs clear to auscultation, No accessory muscle use or cyanosis.   Psychiatry: A & O x 3, Mood & affect appropriate  Gastrointestinal: Soft, Nontender, Nondistended, + BS in all 4 quadrants. No rebound tenderness and no guarding   Skin: No rashes, No ecchymoses, No cyanosis  Neurologic: Non-focal  Extremities: Normal range of motion, No clubbing, cyanosis or edema  Vascular: Peripheral pulses palpable 2+ bilaterally   	  LABS             11.2<L> [11.5 - 15.5]  3.84 [3.8 - 10.5] )-----------( 214 [150 - 400]    ( 09 Aug 2017 06:45 )             36.3 [34.5 - 45.0]    08-    137  |  100  |  25<H>  ----------------------------<  100<H>  4.3   |  22  |  1.01    Ca    9.0      09 Aug 2017 06:30  Mg     2.1     08-08      PT/INR - ( 09 Aug 2017 06:45 )   PT: 26.2 SEC;   INR: 2.30       CAPILLARY BLOOD GLUCOSE  122 (09 Aug 2017 07:58)  112 (08 Aug 2017 22:27)  176 (08 Aug 2017 17:04)  142 (08 Aug 2017 11:45)      I&O's Detail  08 Aug 2017 07:01  -  09 Aug 2017 07:00  --------------------------------------------------------  IN:    heparin  Infusion.: 77 mL    Oral Fluid: 800 mL  Total IN: 877 mL    OUT:  Total OUT: 0 mL  Total NET: 877 mL      09 Aug 2017 07:01  -  09 Aug 2017 10:35  --------------------------------------------------------  IN:  Oral Fluid: 200 mL  Total IN: 200 mL    OUT:  Voided: 100 mL  Total OUT: 100 mL  Total NET: 100 mL         Daily Weight in k (09 Aug 2017 05:42)    Allergies  clavulanate (Unknown)  erythromycin (Hives; Rash)    MEDICATIONS  (STANDING):  aspirin  chewable 81 milliGRAM(s) Oral daily  lisinopril 5 milliGRAM(s) Oral daily  digoxin     Tablet 0.125 milliGRAM(s) Oral daily  meclizine 25 milliGRAM(s) Oral three times a day  atorvastatin 40 milliGRAM(s) Oral at bedtime  insulin lispro (HumaLOG) corrective regimen sliding scale   SubCutaneous three times a day before meals  dextrose 5%. 1000 milliLiter(s) (50 mL/Hr) IV Continuous <Continuous>  dextrose 50% Injectable 12.5 Gram(s) IV Push once  dextrose 50% Injectable 25 Gram(s) IV Push once  dextrose 50% Injectable 25 Gram(s) IV Push once  metoprolol 50 milliGRAM(s) Oral two times a day  spironolactone 25 milliGRAM(s) Oral daily  oseltamivir 75 milliGRAM(s) Oral every 12 hours  furosemide    Tablet 40 milliGRAM(s) Oral two times a day    MEDICATIONS  (PRN):  simethicone 80 milliGRAM(s) Chew every 6 hours PRN Gas  dextrose Gel 1 Dose(s) Oral once PRN Blood Glucose LESS THAN 70 milliGRAM(s)/deciliter  glucagon  Injectable 1 milliGRAM(s) IntraMuscular once PRN Glucose LESS THAN 70 milligrams/deciliter  benzocaine 15 mG/menthol 3.6 mG Lozenge 1 Lozenge Oral three times a day PRN Sore Throat  acetaminophen   Tablet 650 milliGRAM(s) Oral every 6 hours PRN For Temp greater than 38 C (100.4 F)

## 2017-08-09 NOTE — PROGRESS NOTE ADULT - PROBLEM SELECTOR PLAN 3
cont home htn meds
Continue lasix, spironolactone, and digoxin. Monitor telemetry, blood pressures and daily weights.
Continue lasix, spironolactone, and digoxin. Monitor telemetry, blood pressures and daily weights.
cont home htn meds

## 2017-08-09 NOTE — PROGRESS NOTE ADULT - PROBLEM SELECTOR PLAN 1
cont lasix, aldactone, dig
Currently on Tamiflu with symptoms improving.
Currently on Tamiflu with symptoms improving. Continue Tamiflu for full course. Follow up with PCP as outpatient.
cont lasix, aldactone, dig

## 2017-08-09 NOTE — PROGRESS NOTE ADULT - SUBJECTIVE AND OBJECTIVE BOX
Patient without complaints. Denies chest pain, shortness of breath, dyspnea on exertion, palpitations or lightheadedness.    Vital Signs Last 24 Hrs  T(C): 36.4 (09 Aug 2017 13:46), Max: 36.9 (09 Aug 2017 05:42)  T(F): 97.5 (09 Aug 2017 13:46), Max: 98.4 (09 Aug 2017 05:42)  HR: 64 (09 Aug 2017 13:46) (64 - 84)  BP: 102/60 (09 Aug 2017 13:46) (101/53 - 131/70)  BP(mean): --  RR: 18 (09 Aug 2017 13:46) (18 - 18)  SpO2: 100% (09 Aug 2017 13:46) (99% - 100%)    Telemetry:  atrial fibrillation with occasional PVC's/couplets. Rate controlled (64-84bpm)  MEDICATIONS  (STANDING):  aspirin  chewable 81 milliGRAM(s) Oral daily  lisinopril 5 milliGRAM(s) Oral daily  digoxin     Tablet 0.125 milliGRAM(s) Oral daily  meclizine 25 milliGRAM(s) Oral three times a day  atorvastatin 40 milliGRAM(s) Oral at bedtime  insulin lispro (HumaLOG) corrective regimen sliding scale   SubCutaneous three times a day before meals  dextrose 5%. 1000 milliLiter(s) (50 mL/Hr) IV Continuous <Continuous>  dextrose 50% Injectable 12.5 Gram(s) IV Push once  dextrose 50% Injectable 25 Gram(s) IV Push once  dextrose 50% Injectable 25 Gram(s) IV Push once  metoprolol 50 milliGRAM(s) Oral two times a day  spironolactone 25 milliGRAM(s) Oral daily  oseltamivir 75 milliGRAM(s) Oral every 12 hours  furosemide    Tablet 40 milliGRAM(s) Oral two times a day  warfarin 4 milliGRAM(s) Oral once    MEDICATIONS  (PRN):  simethicone 80 milliGRAM(s) Chew every 6 hours PRN Gas  dextrose Gel 1 Dose(s) Oral once PRN Blood Glucose LESS THAN 70 milliGRAM(s)/deciliter  glucagon  Injectable 1 milliGRAM(s) IntraMuscular once PRN Glucose LESS THAN 70 milligrams/deciliter  benzocaine 15 mG/menthol 3.6 mG Lozenge 1 Lozenge Oral three times a day PRN Sore Throat  acetaminophen   Tablet 650 milliGRAM(s) Oral every 6 hours PRN For Temp greater than 38 C (100.4 F)      Physical exam:   Gen-   Well developed. Well nourished. NAD  Resp-  CTA bilaterally  CV-  Normal S1 and S2. Irregular rate and rhythm  ABD- soft nontender. +bowel sounds  EXT- no edema  Neuro-  nonfocal   A&O x3       LABS: 08/09/2017                     11.2   3.84  )-----------( 214                  36.3      PT: 26.2 SEC;   INR: 2.30         137  |  100  |  25<H>  ----------------------------<  100<H>  4.3   |  22  |  1.01    Ca    9.0      09 Aug 2017 06:30  Mg     2.1     08-08                  Assessment and Plan:

## 2017-08-09 NOTE — PROGRESS NOTE ADULT - PROBLEM SELECTOR PLAN 2
cont tamiflu
Continue lisinopril and Metroprolol. Monitor BP. DASH diet.
Continue lisinopril and metroprolo. Monitor BP. DASH diet.
cont tamiflu

## 2017-08-09 NOTE — PROGRESS NOTE ADULT - SUBJECTIVE AND OBJECTIVE BOX
Subjective: No current complaints. Denies chest pain, SOB, PND, orthopnea, palpitations, diaphoresis, dizziness, syncope, fever, chills, malaise, night sweats, abd pain, nausea, vomiting, constipation, diarrhea, HA, numbness weakness, or paralysis.      MEDICATIONS  (STANDING):  aspirin  chewable 81 milliGRAM(s) Oral daily  lisinopril 5 milliGRAM(s) Oral daily  digoxin     Tablet 0.125 milliGRAM(s) Oral daily  meclizine 25 milliGRAM(s) Oral three times a day  atorvastatin 40 milliGRAM(s) Oral at bedtime  insulin lispro (HumaLOG) corrective regimen sliding scale   SubCutaneous three times a day before meals  dextrose 5%. 1000 milliLiter(s) (50 mL/Hr) IV Continuous <Continuous>  dextrose 50% Injectable 12.5 Gram(s) IV Push once  dextrose 50% Injectable 25 Gram(s) IV Push once  dextrose 50% Injectable 25 Gram(s) IV Push once  metoprolol 50 milliGRAM(s) Oral two times a day  spironolactone 25 milliGRAM(s) Oral daily  oseltamivir 75 milliGRAM(s) Oral every 12 hours  furosemide    Tablet 40 milliGRAM(s) Oral two times a day    MEDICATIONS  (PRN):  simethicone 80 milliGRAM(s) Chew every 6 hours PRN Gas  dextrose Gel 1 Dose(s) Oral once PRN Blood Glucose LESS THAN 70 milliGRAM(s)/deciliter  glucagon  Injectable 1 milliGRAM(s) IntraMuscular once PRN Glucose LESS THAN 70 milligrams/deciliter  benzocaine 15 mG/menthol 3.6 mG Lozenge 1 Lozenge Oral three times a day PRN Sore Throat  acetaminophen   Tablet 650 milliGRAM(s) Oral every 6 hours PRN For Temp greater than 38 C (100.4 F)      Vital Signs Last 24 Hrs  T(C): 36.4 (09 Aug 2017 13:46), Max: 36.9 (09 Aug 2017 05:42)  T(F): 97.5 (09 Aug 2017 13:46), Max: 98.4 (09 Aug 2017 05:42)  HR: 73 (09 Aug 2017 17:39) (64 - 77)  BP: 103/62 (09 Aug 2017 17:39) (101/53 - 131/70)  BP(mean): --  RR: 18 (09 Aug 2017 13:46) (18 - 18)  SpO2: 100% (09 Aug 2017 13:46) (100% - 100%)    Constitutional: No fever, fatigue  Skin: No rash.  Eyes: No recent vision problems or eye pain.  ENT: No congestion, ear pain, or sore throat.  Cardiovascular: No chest pain or palpation.  Respiratory: No cough, shortness of breath, congestion, or wheezing.  Gastrointestinal: No abdominal pain, nausea, vomiting, or diarrhea.  Genitourinary: No dysuria.  Musculoskeletal: No joint swelling.  Neurologic: No headache.      Appearance: Normal appearing female resting comfortably in no acute distress	  HEENT: Normal oral mucosa, No oral lesions or exudates, PERRL, EOMI	  Lymphatic: No lymphadenopathy  Cardiovascular: Normal S1 S2, No JVD, No murmurs, No edema  Respiratory: Lungs clear to auscultation, No accessory muscle use or cyanosis.   Psychiatry: A & O x 3, Mood & affect appropriate  Gastrointestinal: Soft, Nontender, Nondistended, + BS in all 4 quadrants. No rebound tenderness and no guarding   Skin: No rashes, No ecchymoses, No cyanosis  Neurologic: Non-focal  Extremities: Normal range of motion, No clubbing, cyanosis or edema  Vascular: Peripheral pulses palpable 2+ bilaterally    LABS:      137  |  100  |  25<H>  ----------------------------<  100<H>  4.3   |  22  |  1.01    Ca    9.0      09 Aug 2017 06:30  Mg     2.1     08-      Creatinine Trend: 1.01 <--, 1.22 <--, 1.26 <--, 1.34 <--, 1.18 <--, 1.16 <--, 1.14 <--                        11.2   3.84  )-----------( 214      ( 09 Aug 2017 06:45 )             36.3     Urine Studies:  Urinalysis Basic - ( 06 Aug 2017 07:46 )    Color: YELLOW / Appearance: CLEAR / S.011 / pH: 6.0  Gluc: NEGATIVE / Ketone: NEGATIVE  / Bili: NEGATIVE / Urobili: NORMAL E.U.   Blood: NEGATIVE / Protein: 20 / Nitrite: NEGATIVE   Leuk Esterase: NEGATIVE / RBC: 0-2 / WBC 2-5   Sq Epi:  / Non Sq Epi:  / Bacteria:                 PT/INR - ( 09 Aug 2017 06:45 )   PT: 26.2 SEC;   INR: 2.30

## 2017-08-10 VITALS
OXYGEN SATURATION: 100 % | WEIGHT: 158.95 LBS | RESPIRATION RATE: 18 BRPM | DIASTOLIC BLOOD PRESSURE: 72 MMHG | HEART RATE: 70 BPM | TEMPERATURE: 97 F | SYSTOLIC BLOOD PRESSURE: 133 MMHG

## 2017-08-10 LAB
BACTERIA BLD CULT: SIGNIFICANT CHANGE UP
BACTERIA BLD CULT: SIGNIFICANT CHANGE UP
BUN SERPL-MCNC: 29 MG/DL — HIGH (ref 7–23)
CALCIUM SERPL-MCNC: 9.2 MG/DL — SIGNIFICANT CHANGE UP (ref 8.4–10.5)
CHLORIDE SERPL-SCNC: 101 MMOL/L — SIGNIFICANT CHANGE UP (ref 98–107)
CO2 SERPL-SCNC: 21 MMOL/L — LOW (ref 22–31)
CREAT SERPL-MCNC: 1.49 MG/DL — HIGH (ref 0.5–1.3)
GLUCOSE SERPL-MCNC: 141 MG/DL — HIGH (ref 70–99)
HCT VFR BLD CALC: 38.2 % — SIGNIFICANT CHANGE UP (ref 34.5–45)
HGB BLD-MCNC: 11.4 G/DL — LOW (ref 11.5–15.5)
INR BLD: 2.63 — HIGH (ref 0.88–1.17)
MCHC RBC-ENTMCNC: 20 PG — LOW (ref 27–34)
MCHC RBC-ENTMCNC: 29.8 % — LOW (ref 32–36)
MCV RBC AUTO: 67.1 FL — LOW (ref 80–100)
NRBC # FLD: 0 — SIGNIFICANT CHANGE UP
PLATELET # BLD AUTO: 217 K/UL — SIGNIFICANT CHANGE UP (ref 150–400)
PMV BLD: 11.3 FL — SIGNIFICANT CHANGE UP (ref 7–13)
POTASSIUM SERPL-MCNC: 4.9 MMOL/L — SIGNIFICANT CHANGE UP (ref 3.5–5.3)
POTASSIUM SERPL-SCNC: 4.9 MMOL/L — SIGNIFICANT CHANGE UP (ref 3.5–5.3)
PROTHROM AB SERPL-ACNC: 30.1 SEC — HIGH (ref 9.8–13.1)
RBC # BLD: 5.69 M/UL — HIGH (ref 3.8–5.2)
RBC # FLD: 18.6 % — HIGH (ref 10.3–14.5)
SODIUM SERPL-SCNC: 138 MMOL/L — SIGNIFICANT CHANGE UP (ref 135–145)
WBC # BLD: 3.94 K/UL — SIGNIFICANT CHANGE UP (ref 3.8–10.5)
WBC # FLD AUTO: 3.94 K/UL — SIGNIFICANT CHANGE UP (ref 3.8–10.5)

## 2017-08-10 RX ORDER — LANOLIN ALCOHOL/MO/W.PET/CERES
3 CREAM (GRAM) TOPICAL AT BEDTIME
Qty: 0 | Refills: 0 | Status: DISCONTINUED | OUTPATIENT
Start: 2017-08-10 | End: 2017-08-10

## 2017-08-10 RX ORDER — WARFARIN SODIUM 2.5 MG/1
3 TABLET ORAL
Qty: 90 | Refills: 0 | OUTPATIENT
Start: 2017-08-10 | End: 2017-09-09

## 2017-08-10 RX ORDER — SPIRONOLACTONE 25 MG/1
1 TABLET, FILM COATED ORAL
Qty: 30 | Refills: 0 | OUTPATIENT
Start: 2017-08-10 | End: 2017-09-09

## 2017-08-10 RX ORDER — FUROSEMIDE 40 MG
1 TABLET ORAL
Qty: 60 | Refills: 0 | OUTPATIENT
Start: 2017-08-10 | End: 2017-09-09

## 2017-08-10 RX ORDER — WARFARIN SODIUM 2.5 MG/1
4 TABLET ORAL ONCE
Qty: 0 | Refills: 0 | Status: DISCONTINUED | OUTPATIENT
Start: 2017-08-10 | End: 2017-08-10

## 2017-08-10 RX ORDER — LISINOPRIL 2.5 MG/1
1 TABLET ORAL
Qty: 0 | Refills: 0 | COMMUNITY
Start: 2017-08-10

## 2017-08-10 RX ORDER — LISINOPRIL 2.5 MG/1
1 TABLET ORAL
Qty: 30 | Refills: 0 | OUTPATIENT
Start: 2017-08-10 | End: 2017-09-09

## 2017-08-10 RX ORDER — METOPROLOL TARTRATE 50 MG
1 TABLET ORAL
Qty: 60 | Refills: 0 | OUTPATIENT
Start: 2017-08-10 | End: 2017-09-09

## 2017-08-10 RX ORDER — ATORVASTATIN CALCIUM 80 MG/1
1 TABLET, FILM COATED ORAL
Qty: 30 | Refills: 0 | OUTPATIENT
Start: 2017-08-10 | End: 2017-09-09

## 2017-08-10 RX ADMIN — Medication 0.12 MILLIGRAM(S): at 05:42

## 2017-08-10 RX ADMIN — Medication 75 MILLIGRAM(S): at 05:42

## 2017-08-10 RX ADMIN — Medication 25 MILLIGRAM(S): at 12:51

## 2017-08-10 RX ADMIN — SPIRONOLACTONE 25 MILLIGRAM(S): 25 TABLET, FILM COATED ORAL at 05:42

## 2017-08-10 RX ADMIN — Medication 50 MILLIGRAM(S): at 05:42

## 2017-08-10 RX ADMIN — Medication 81 MILLIGRAM(S): at 12:51

## 2017-08-10 RX ADMIN — LISINOPRIL 5 MILLIGRAM(S): 2.5 TABLET ORAL at 05:42

## 2017-08-10 RX ADMIN — Medication 40 MILLIGRAM(S): at 05:42

## 2017-08-10 RX ADMIN — Medication 25 MILLIGRAM(S): at 05:42

## 2017-08-10 NOTE — PROGRESS NOTE ADULT - SUBJECTIVE AND OBJECTIVE BOX
Patient is a 76y old  Female who presents with a chief complaint of Tx for cardiac cath from [facility] (04 Aug 2017 15:35)  still coughing.  Denies palpitations, dizziness or lightheadeness      MEDICATIONS  (STANDING):  aspirin  chewable 81 milliGRAM(s) Oral daily  lisinopril 5 milliGRAM(s) Oral daily  digoxin     Tablet 0.125 milliGRAM(s) Oral daily  meclizine 25 milliGRAM(s) Oral three times a day  atorvastatin 40 milliGRAM(s) Oral at bedtime  insulin lispro (HumaLOG) corrective regimen sliding scale   SubCutaneous three times a day before meals  dextrose 5%. 1000 milliLiter(s) (50 mL/Hr) IV Continuous <Continuous>  dextrose 50% Injectable 12.5 Gram(s) IV Push once  dextrose 50% Injectable 25 Gram(s) IV Push once  dextrose 50% Injectable 25 Gram(s) IV Push once  metoprolol 50 milliGRAM(s) Oral two times a day  spironolactone 25 milliGRAM(s) Oral daily  oseltamivir 75 milliGRAM(s) Oral every 12 hours  furosemide    Tablet 40 milliGRAM(s) Oral two times a day    MEDICATIONS  (PRN):  simethicone 80 milliGRAM(s) Chew every 6 hours PRN Gas  dextrose Gel 1 Dose(s) Oral once PRN Blood Glucose LESS THAN 70 milliGRAM(s)/deciliter  glucagon  Injectable 1 milliGRAM(s) IntraMuscular once PRN Glucose LESS THAN 70 milligrams/deciliter  benzocaine 15 mG/menthol 3.6 mG Lozenge 1 Lozenge Oral three times a day PRN Sore Throat  acetaminophen   Tablet 650 milliGRAM(s) Oral every 6 hours PRN For Temp greater than 38 C (100.4 F)  melatonin 3 milliGRAM(s) Oral at bedtime PRN Insomnia            Vital Signs Last 24 Hrs  T(C): 36.2 (10 Aug 2017 05:40), Max: 36.4 (09 Aug 2017 13:46)  T(F): 97.2 (10 Aug 2017 05:40), Max: 97.5 (09 Aug 2017 13:46)  HR: 70 (10 Aug 2017 05:40) (63 - 73)  BP: 133/72 (10 Aug 2017 05:40) (102/60 - 133/72)  BP(mean): --  RR: 18 (10 Aug 2017 05:40) (18 - 18)  SpO2: 100% (10 Aug 2017 05:40) (100% - 100%)            INTERPRETATION OF TELEMETRY:  AFib 60-70 bpm, pauses 2.2 seconds with transient arabella 40'-50's during sleep; occasional PVC's    ECG:        LABS:                        11.4   3.94  )-----------( 217      ( 10 Aug 2017 07:30 )             38.2     08-10    138  |  101  |  29<H>  ----------------------------<  141<H>  4.9   |  21<L>  |  1.49<H>    Ca    9.2      10 Aug 2017 06:45          PT/INR - ( 10 Aug 2017 07:30 )   PT: 30.1 SEC;   INR: 2.63                BNP  RADIOLOGY & ADDITIONAL STUDIES:      PHYSICAL EXAM:    GENERAL: In no apparent distress, well nourished, and hydrated.  NECK: Supple and normal thyroid.  No JVD or carotid bruit.  Carotid pulse is 2+ bilaterally.  HEART: Regular rate and rhythm; No murmurs, rubs, or gallops.  PULMONARY: Clear to auscultation and perfusion.  No rales, wheezing, or rhonchi bilaterally.  ABDOMEN: Soft, Nontender, Nondistended; Bowel sounds present  EXTREMITIES:  2+ Peripheral Pulses, No clubbing, cyanosis, or edema  NEUROLOGICAL: Grossly nonfocal

## 2017-08-10 NOTE — PROGRESS NOTE ADULT - SUBJECTIVE AND OBJECTIVE BOX
Subjective: Patient seen and examined. No new events except as noted.     SUBJECTIVE/ROS:    No chest pain, or sob.      MEDICATIONS:  MEDICATIONS  (STANDING):  aspirin  chewable 81 milliGRAM(s) Oral daily  lisinopril 5 milliGRAM(s) Oral daily  digoxin     Tablet 0.125 milliGRAM(s) Oral daily  meclizine 25 milliGRAM(s) Oral three times a day  atorvastatin 40 milliGRAM(s) Oral at bedtime  insulin lispro (HumaLOG) corrective regimen sliding scale   SubCutaneous three times a day before meals  dextrose 5%. 1000 milliLiter(s) (50 mL/Hr) IV Continuous <Continuous>  dextrose 50% Injectable 12.5 Gram(s) IV Push once  dextrose 50% Injectable 25 Gram(s) IV Push once  dextrose 50% Injectable 25 Gram(s) IV Push once  metoprolol 50 milliGRAM(s) Oral two times a day  spironolactone 25 milliGRAM(s) Oral daily  oseltamivir 75 milliGRAM(s) Oral every 12 hours  furosemide    Tablet 40 milliGRAM(s) Oral two times a day  warfarin 4 milliGRAM(s) Oral once      PHYSICAL EXAM:  T(C): 36.2 (08-10-17 @ 05:40), Max: 36.3 (08-09-17 @ 21:38)  HR: 70 (08-10-17 @ 05:40) (63 - 73)  BP: 133/72 (08-10-17 @ 05:40) (103/62 - 133/72)  RR: 18 (08-10-17 @ 05:40) (18 - 18)  SpO2: 100% (08-10-17 @ 05:40) (100% - 100%)  Wt(kg): --  I&O's Summary    09 Aug 2017 07:01  -  10 Aug 2017 07:00  --------------------------------------------------------  IN: 600 mL / OUT: 400 mL / NET: 200 mL          Appearance: Normal	  HEENT:   Normal oral mucosa, PERRL, EOMI	  Cardiovascular: Normal S1 S2,    Murmur:   Neck: JVP normal  Respiratory: Lungs clear to auscultation  Gastrointestinal:  Soft, Non-tender, + BS	  Skin: normal   Neuro: No gross deficits.   Psychiatry:  Mood & affect appropriate  Ext: No edema        LABS:    CARDIAC MARKERS:                                11.4   3.94  )-----------( 217      ( 10 Aug 2017 07:30 )             38.2     08-10    138  |  101  |  29<H>  ----------------------------<  141<H>  4.9   |  21<L>  |  1.49<H>    Ca    9.2      10 Aug 2017 06:45      proBNP:   Lipid Profile:   HgA1c:   TSH:           TELEMETRY: 	    ECG:  	  RADIOLOGY:   DIAGNOSTIC TESTING:  Echocardiogram:  Catheterization:  Stress Test:    OTHER:

## 2017-08-10 NOTE — PROGRESS NOTE ADULT - ASSESSMENT
acute systolic chf   cont  lasix to BID.   cont bb/ace/aldactone    Afib  Rate stable  a/c    NSVT  ep input appreciated
acute systolic chf - mildly volume overloaded  change lasix to BID.   cont bb/ace/aldactone    Afib  Rate stable  a/c    NSVT  fu with EP  for consideration of ICD vs Lifevest
76 year old female with PMH of HFrEF, chronic atrial fibrillation on Coumadin, diabetes 2 and recently diagnosed  non-ischemic cardiomyopathy who was transferred from [facility] with acute on chronic heart failure.  Patient underwent JULIANO on 8/4 which showed severe LV systolic dysfunction and moderate aortic stenosis.  Echo done on 6/20/2017 showed moderately reduced LV systolic function. On telemetry there is evidence of nonsustained ventricular tachycardia. Her atrial fibrillation is rate controlled. She has been started on optimal medical therapy and her LVEF will be reevaluated in 3 months by echocardiogram. If at that time her EF remains <35%, an ICD for primary prevention of sudden cardiac death will be recommended. She is aware of the plan and agrees to follow-up.  Optimal medical therapy to include beta blocker and ACE-I.  Keep K+ >4.0 and Mg >2.0.  -F/u with Dr. Rhoades for possible ICD eval if EF < or =35%  -F/u Dr. Yip  for a repeat echo in 3 months  -Continue AC with warfarin for INR 2-3 for CHADS=5  -No Life Vest recommended
76 year old female with PMH of HFrEF, chronic atrial fibrillation on Coumadin, diabetes 2 and recently diagnosed  non-ischemic cardiomyopathy who was transferred from [facility] with acute on chronic heart failure.  Patient underwent JULIANO on 8/4 which showed severe LV systolic dysfunction and moderate aortic stenosis.  Echo done on 6/20/2017 showed moderately reduced LV systolic function. On telemetry there is evidence of nonsustained ventricular tachycardia. Her atrial fibrillation is rate controlled. She has been started on optimal medical therapy and her LVEF will be reevaluated in 3 months by echocardiogram. If at that time her EF remains <35%, an ICD for primary prevention of sudden cardiac death will be recommended. She is aware of the plan and agrees to follow-up.  Optimal medical therapy to include beta blocker and ACE-I.  Keep K+ >4.0 and Mg >2.0.  -F/u for a repeat echo in 3 months, ICD eval if EF < or =35%  -Continue AC with warfarin for INR 2-3 for CHADS=5  -No Life Vest recommended
77 yo female w/ PMHx HTN, Atrial Fibrillation (CHADS2-5 on Coumadin), DMT2 (on insulin lispro), and CHF (on lasix, spironolactone and dig) admitted with impression of CHF exerbation due to influenza (on Tamiflu) in with no complaints today in stable condition.
Jazmín Patterson 75 yo female w/ PMHx HFrEF, Afib on Coumadin, DMT2, vertigo admitted with Acute on Chronic Systolic Heart failure,had cath-Non obstructive CAD,noted Moderate Mitral regurgitation on TTE,had JULIANO-mild-moderate MR,severe global LV dysfunction-increase Metoprolol for AF rate control,goal INR 2-2.5.    Discussed with pts daughter.
acute systolic chf   cont  lasix decrease dose given elevated cr dc aldactone  cont bb/ace    Afib  Rate stable  a/c    NSVT  ep input appreciated
77 yo female w/ PMHx HTN, Atrial Fibrillation (CHADS2-5 on Coumadin), DMT2 (on insulin lispro), and CHF (on lasix, spironolactone and dig) admitted with impression of CHF exerbation due to influenza (on Tamiflu) in with no complaints today in stable condition.

## 2017-08-10 NOTE — PROGRESS NOTE ADULT - PROVIDER SPECIALTY LIST ADULT
Cardiology
Electrophysiology
Electrophysiology
Internal Medicine
Cardiology

## 2017-08-17 ENCOUNTER — INPATIENT (INPATIENT)
Facility: HOSPITAL | Age: 77
LOS: 11 days | Discharge: NURSING, REHAB OR LONG-TERM CARE | End: 2017-08-29
Attending: INTERNAL MEDICINE | Admitting: INTERNAL MEDICINE
Payer: MEDICARE

## 2017-08-17 VITALS
RESPIRATION RATE: 18 BRPM | OXYGEN SATURATION: 100 % | HEART RATE: 110 BPM | SYSTOLIC BLOOD PRESSURE: 141 MMHG | DIASTOLIC BLOOD PRESSURE: 96 MMHG | TEMPERATURE: 98 F

## 2017-08-17 LAB
ALBUMIN SERPL ELPH-MCNC: 3.9 G/DL — SIGNIFICANT CHANGE UP (ref 3.3–5)
ALP SERPL-CCNC: 45 U/L — SIGNIFICANT CHANGE UP (ref 40–120)
ALT FLD-CCNC: 18 U/L — SIGNIFICANT CHANGE UP (ref 4–33)
APTT BLD: 31.2 SEC — SIGNIFICANT CHANGE UP (ref 27.5–37.4)
AST SERPL-CCNC: 70 U/L — HIGH (ref 4–32)
BASE EXCESS BLDV CALC-SCNC: -3.8 MMOL/L — SIGNIFICANT CHANGE UP
BASOPHILS # BLD AUTO: 0.02 K/UL — SIGNIFICANT CHANGE UP (ref 0–0.2)
BASOPHILS NFR BLD AUTO: 0.2 % — SIGNIFICANT CHANGE UP (ref 0–2)
BILIRUB SERPL-MCNC: 0.7 MG/DL — SIGNIFICANT CHANGE UP (ref 0.2–1.2)
BLOOD GAS VENOUS - CREATININE: 2.56 MG/DL — HIGH (ref 0.5–1.3)
BUN SERPL-MCNC: 110 MG/DL — HIGH (ref 7–23)
CALCIUM SERPL-MCNC: 9.5 MG/DL — SIGNIFICANT CHANGE UP (ref 8.4–10.5)
CHLORIDE BLDV-SCNC: 96 MMOL/L — SIGNIFICANT CHANGE UP (ref 96–108)
CHLORIDE SERPL-SCNC: 91 MMOL/L — LOW (ref 98–107)
CO2 SERPL-SCNC: 18 MMOL/L — LOW (ref 22–31)
CREAT SERPL-MCNC: 2.59 MG/DL — HIGH (ref 0.5–1.3)
DIGOXIN SERPL-MCNC: 2.1 NG/ML — HIGH (ref 0.8–2)
EOSINOPHIL # BLD AUTO: 0 K/UL — SIGNIFICANT CHANGE UP (ref 0–0.5)
EOSINOPHIL NFR BLD AUTO: 0 % — SIGNIFICANT CHANGE UP (ref 0–6)
GAS PNL BLDV: 124 MMOL/L — LOW (ref 136–146)
GLUCOSE BLDV-MCNC: 83 — SIGNIFICANT CHANGE UP (ref 70–99)
GLUCOSE SERPL-MCNC: 84 MG/DL — SIGNIFICANT CHANGE UP (ref 70–99)
HCO3 BLDV-SCNC: 19 MMOL/L — LOW (ref 20–27)
HCT VFR BLD CALC: 37.9 % — SIGNIFICANT CHANGE UP (ref 34.5–45)
HCT VFR BLDV CALC: 35.4 % — SIGNIFICANT CHANGE UP (ref 34.5–45)
HGB BLD-MCNC: 11.6 G/DL — SIGNIFICANT CHANGE UP (ref 11.5–15.5)
HGB BLDV-MCNC: 11.5 G/DL — SIGNIFICANT CHANGE UP (ref 11.5–15.5)
IMM GRANULOCYTES # BLD AUTO: 0.04 # — SIGNIFICANT CHANGE UP
IMM GRANULOCYTES NFR BLD AUTO: 0.4 % — SIGNIFICANT CHANGE UP (ref 0–1.5)
INR BLD: 4.51 — HIGH (ref 0.88–1.17)
LACTATE BLDV-MCNC: 3.2 MMOL/L — HIGH (ref 0.5–2)
LYMPHOCYTES # BLD AUTO: 1.06 K/UL — SIGNIFICANT CHANGE UP (ref 1–3.3)
LYMPHOCYTES # BLD AUTO: 11.9 % — LOW (ref 13–44)
MCHC RBC-ENTMCNC: 20.2 PG — LOW (ref 27–34)
MCHC RBC-ENTMCNC: 30.6 % — LOW (ref 32–36)
MCV RBC AUTO: 66.1 FL — LOW (ref 80–100)
MONOCYTES # BLD AUTO: 0.81 K/UL — SIGNIFICANT CHANGE UP (ref 0–0.9)
MONOCYTES NFR BLD AUTO: 9.1 % — SIGNIFICANT CHANGE UP (ref 2–14)
NEUTROPHILS # BLD AUTO: 6.97 K/UL — SIGNIFICANT CHANGE UP (ref 1.8–7.4)
NEUTROPHILS NFR BLD AUTO: 78.4 % — HIGH (ref 43–77)
NRBC # FLD: 0 — SIGNIFICANT CHANGE UP
NT-PROBNP SERPL-SCNC: 1274 PG/ML — SIGNIFICANT CHANGE UP
PCO2 BLDV: 47 MMHG — SIGNIFICANT CHANGE UP (ref 41–51)
PH BLDV: 7.29 PH — LOW (ref 7.32–7.43)
PLATELET # BLD AUTO: 364 K/UL — SIGNIFICANT CHANGE UP (ref 150–400)
PMV BLD: 9.9 FL — SIGNIFICANT CHANGE UP (ref 7–13)
PO2 BLDV: < 24 MMHG — LOW (ref 35–40)
POTASSIUM BLDV-SCNC: 9.8 MMOL/L — CRITICAL HIGH (ref 3.4–4.5)
POTASSIUM SERPL-MCNC: SIGNIFICANT CHANGE UP MMOL/L (ref 3.5–5.3)
POTASSIUM SERPL-SCNC: SIGNIFICANT CHANGE UP MMOL/L (ref 3.5–5.3)
PROT SERPL-MCNC: 8 G/DL — SIGNIFICANT CHANGE UP (ref 6–8.3)
PROTHROM AB SERPL-ACNC: 52.1 SEC — HIGH (ref 9.8–13.1)
RBC # BLD: 5.73 M/UL — HIGH (ref 3.8–5.2)
RBC # FLD: 18.8 % — HIGH (ref 10.3–14.5)
SAO2 % BLDV: 18.3 % — LOW (ref 60–85)
SODIUM SERPL-SCNC: 127 MMOL/L — LOW (ref 135–145)
TROPONIN T SERPL-MCNC: < 0.06 NG/ML — SIGNIFICANT CHANGE UP (ref 0–0.06)
WBC # BLD: 8.9 K/UL — SIGNIFICANT CHANGE UP (ref 3.8–10.5)
WBC # FLD AUTO: 8.9 K/UL — SIGNIFICANT CHANGE UP (ref 3.8–10.5)

## 2017-08-17 PROCEDURE — 71010: CPT | Mod: 26

## 2017-08-17 RX ORDER — SODIUM BICARBONATE 1 MEQ/ML
50 SYRINGE (ML) INTRAVENOUS ONCE
Qty: 0 | Refills: 0 | Status: COMPLETED | OUTPATIENT
Start: 2017-08-17 | End: 2017-08-17

## 2017-08-17 RX ORDER — SODIUM CHLORIDE 9 MG/ML
250 INJECTION INTRAMUSCULAR; INTRAVENOUS; SUBCUTANEOUS ONCE
Qty: 0 | Refills: 0 | Status: DISCONTINUED | OUTPATIENT
Start: 2017-08-17 | End: 2017-08-17

## 2017-08-17 RX ORDER — CALCIUM GLUCONATE 100 MG/ML
1 VIAL (ML) INTRAVENOUS ONCE
Qty: 1 | Refills: 0 | Status: COMPLETED | OUTPATIENT
Start: 2017-08-17 | End: 2017-08-17

## 2017-08-17 RX ORDER — SODIUM CHLORIDE 9 MG/ML
500 INJECTION INTRAMUSCULAR; INTRAVENOUS; SUBCUTANEOUS ONCE
Qty: 0 | Refills: 0 | Status: COMPLETED | OUTPATIENT
Start: 2017-08-17 | End: 2017-08-17

## 2017-08-17 RX ORDER — SODIUM CHLORIDE 9 MG/ML
250 INJECTION INTRAMUSCULAR; INTRAVENOUS; SUBCUTANEOUS ONCE
Qty: 0 | Refills: 0 | Status: COMPLETED | OUTPATIENT
Start: 2017-08-17 | End: 2017-08-17

## 2017-08-17 RX ADMIN — SODIUM CHLORIDE 250 MILLILITER(S): 9 INJECTION INTRAMUSCULAR; INTRAVENOUS; SUBCUTANEOUS at 21:05

## 2017-08-17 RX ADMIN — Medication 50 MILLIEQUIVALENT(S): at 21:39

## 2017-08-17 RX ADMIN — SODIUM CHLORIDE 500 MILLILITER(S): 9 INJECTION INTRAMUSCULAR; INTRAVENOUS; SUBCUTANEOUS at 23:55

## 2017-08-17 RX ADMIN — Medication 200 GRAM(S): at 21:40

## 2017-08-17 NOTE — ED PROVIDER NOTE - ATTENDING CONTRIBUTION TO CARE
Locurto  pt with afib  recurrent vomiting and diarrhea  with recent poor po intake  no SOB  mild abd pain  exam  clear lungs  card irreg  rate controlled  tender left abd no guarding or distension  EKG peaking ant TW waves  QRS otherwise nl  treated for hyperkalemia  given fluid Locurto  pt with afib  recurrent vomiting and diarrhea  with recent poor po intake  no SOB  mild abd pain  exam  clear lungs  card irreg  rate controlled  tender left abd no guarding or distension  stool dark green  sent for occult  EKG peaking ant TW waves  QRS otherwise nl  treated for hyperkalemia  given fluid

## 2017-08-17 NOTE — ED ADULT TRIAGE NOTE - CHIEF COMPLAINT QUOTE
Pt c/o of nausea vomiting and having diarrhea states she developed chest pain this morning  Pt states she was just discharged from [facility] on 8/10/2017 she appears very pale in triage.

## 2017-08-17 NOTE — ED PROVIDER NOTE - OTHER FINDINGS
Peaked T waves. T wave inversion V4, V5, V6. Also I, II, aVL, aVF. T waves less peaked compared to prior.

## 2017-08-17 NOTE — ED PROVIDER NOTE - PROGRESS NOTE DETAILS
Patient with persistently low BP. Increased fluids to 500cc NS bolus. Will continue to monitor. Attending Note (Sandy): patient signed out pending CT. Reviewed, no acute pathology. admit.

## 2017-08-17 NOTE — ED ADULT NURSE NOTE - OBJECTIVE STATEMENT
Patient received into room 8 AA&Ox1 c/o chest pain s/p vomiting with diarrhea this AM. VSS on RA. Patient in afib upon scope of monitor. Family at bedside - will continue to monitor.

## 2017-08-17 NOTE — ED PROVIDER NOTE - MEDICAL DECISION MAKING DETAILS
Abnormal EKG and hyperkalemia. Will give light fluids given recent hospitalization for CHF and low ejection fraction. Will obtain trops, coags, CXR, and CT abdomen/pelvis. To be admitted - surgery or medicine depending on results of CT.

## 2017-08-17 NOTE — ED ADULT NURSE NOTE - CHIEF COMPLAINT QUOTE
Pt c/o of nausea vomiting and having diarrhea states she developed chest pain this morning  Pt states she was just discharged from [facility] on 8/10/2017 she appears very pale in triage.

## 2017-08-18 DIAGNOSIS — I95.9 HYPOTENSION, UNSPECIFIED: ICD-10-CM

## 2017-08-18 DIAGNOSIS — I50.9 HEART FAILURE, UNSPECIFIED: ICD-10-CM

## 2017-08-18 DIAGNOSIS — R10.9 UNSPECIFIED ABDOMINAL PAIN: ICD-10-CM

## 2017-08-18 DIAGNOSIS — E87.1 HYPO-OSMOLALITY AND HYPONATREMIA: ICD-10-CM

## 2017-08-18 DIAGNOSIS — I48.91 UNSPECIFIED ATRIAL FIBRILLATION: ICD-10-CM

## 2017-08-18 DIAGNOSIS — N17.9 ACUTE KIDNEY FAILURE, UNSPECIFIED: ICD-10-CM

## 2017-08-18 DIAGNOSIS — I10 ESSENTIAL (PRIMARY) HYPERTENSION: ICD-10-CM

## 2017-08-18 LAB
ALBUMIN SERPL ELPH-MCNC: 3.2 G/DL — LOW (ref 3.3–5)
ALP SERPL-CCNC: 41 U/L — SIGNIFICANT CHANGE UP (ref 40–120)
ALT FLD-CCNC: 11 U/L — SIGNIFICANT CHANGE UP (ref 4–33)
APTT BLD: 35.4 SEC — SIGNIFICANT CHANGE UP (ref 27.5–37.4)
AST SERPL-CCNC: 15 U/L — SIGNIFICANT CHANGE UP (ref 4–32)
BASE EXCESS BLDV CALC-SCNC: -3.5 MMOL/L — SIGNIFICANT CHANGE UP
BILIRUB SERPL-MCNC: 0.5 MG/DL — SIGNIFICANT CHANGE UP (ref 0.2–1.2)
BUN SERPL-MCNC: 101 MG/DL — HIGH (ref 7–23)
BUN SERPL-MCNC: 108 MG/DL — HIGH (ref 7–23)
CALCIUM SERPL-MCNC: 10.1 MG/DL — SIGNIFICANT CHANGE UP (ref 8.4–10.5)
CALCIUM SERPL-MCNC: 8.9 MG/DL — SIGNIFICANT CHANGE UP (ref 8.4–10.5)
CHLORIDE SERPL-SCNC: 104 MMOL/L — SIGNIFICANT CHANGE UP (ref 98–107)
CHLORIDE SERPL-SCNC: 94 MMOL/L — LOW (ref 98–107)
CK SERPL-CCNC: 37 U/L — SIGNIFICANT CHANGE UP (ref 25–170)
CO2 SERPL-SCNC: 19 MMOL/L — LOW (ref 22–31)
CO2 SERPL-SCNC: 20 MMOL/L — LOW (ref 22–31)
CREAT SERPL-MCNC: 1.55 MG/DL — HIGH (ref 0.5–1.3)
CREAT SERPL-MCNC: 2.27 MG/DL — HIGH (ref 0.5–1.3)
GAS PNL BLDV: 137 MMOL/L — SIGNIFICANT CHANGE UP (ref 136–146)
GLUCOSE BLDV-MCNC: 156 — HIGH (ref 70–99)
GLUCOSE SERPL-MCNC: 164 MG/DL — HIGH (ref 70–99)
GLUCOSE SERPL-MCNC: 81 MG/DL — SIGNIFICANT CHANGE UP (ref 70–99)
HCO3 BLDV-SCNC: 21 MMOL/L — SIGNIFICANT CHANGE UP (ref 20–27)
HCT VFR BLD CALC: 25.8 % — LOW (ref 34.5–45)
HCT VFR BLDV CALC: 25.1 % — LOW (ref 34.5–45)
HGB BLD-MCNC: 7.9 G/DL — LOW (ref 11.5–15.5)
HGB BLDV-MCNC: 8.1 G/DL — LOW (ref 11.5–15.5)
INR BLD: 4.37 — HIGH (ref 0.88–1.17)
LACTATE SERPL-SCNC: 1.7 MMOL/L — SIGNIFICANT CHANGE UP (ref 0.5–2)
MAGNESIUM SERPL-MCNC: 1.7 MG/DL — SIGNIFICANT CHANGE UP (ref 1.6–2.6)
MCHC RBC-ENTMCNC: 20.4 PG — LOW (ref 27–34)
MCHC RBC-ENTMCNC: 30.6 % — LOW (ref 32–36)
MCV RBC AUTO: 66.7 FL — LOW (ref 80–100)
NRBC # FLD: 0 — SIGNIFICANT CHANGE UP
OB PNL STL: POSITIVE — SIGNIFICANT CHANGE UP
PCO2 BLDV: 43 MMHG — SIGNIFICANT CHANGE UP (ref 41–51)
PH BLDV: 7.32 PH — SIGNIFICANT CHANGE UP (ref 7.32–7.43)
PHOSPHATE SERPL-MCNC: 2.2 MG/DL — LOW (ref 2.5–4.5)
PLATELET # BLD AUTO: 305 K/UL — SIGNIFICANT CHANGE UP (ref 150–400)
PMV BLD: 9.9 FL — SIGNIFICANT CHANGE UP (ref 7–13)
PO2 BLDV: 38 MMHG — SIGNIFICANT CHANGE UP (ref 35–40)
POTASSIUM BLDV-SCNC: 4.3 MMOL/L — SIGNIFICANT CHANGE UP (ref 3.4–4.5)
POTASSIUM SERPL-MCNC: 4.6 MMOL/L — SIGNIFICANT CHANGE UP (ref 3.5–5.3)
POTASSIUM SERPL-MCNC: 5 MMOL/L — SIGNIFICANT CHANGE UP (ref 3.5–5.3)
POTASSIUM SERPL-SCNC: 4.6 MMOL/L — SIGNIFICANT CHANGE UP (ref 3.5–5.3)
POTASSIUM SERPL-SCNC: 5 MMOL/L — SIGNIFICANT CHANGE UP (ref 3.5–5.3)
PROT SERPL-MCNC: 5.9 G/DL — LOW (ref 6–8.3)
PROTHROM AB SERPL-ACNC: 50.4 SEC — HIGH (ref 9.8–13.1)
RBC # BLD: 3.87 M/UL — SIGNIFICANT CHANGE UP (ref 3.8–5.2)
RBC # FLD: 18.1 % — HIGH (ref 10.3–14.5)
SAO2 % BLDV: 60.6 % — SIGNIFICANT CHANGE UP (ref 60–85)
SODIUM SERPL-SCNC: 133 MMOL/L — LOW (ref 135–145)
SODIUM SERPL-SCNC: 138 MMOL/L — SIGNIFICANT CHANGE UP (ref 135–145)
WBC # BLD: 8.88 K/UL — SIGNIFICANT CHANGE UP (ref 3.8–10.5)
WBC # FLD AUTO: 8.88 K/UL — SIGNIFICANT CHANGE UP (ref 3.8–10.5)

## 2017-08-18 PROCEDURE — 74176 CT ABD & PELVIS W/O CONTRAST: CPT | Mod: 26

## 2017-08-18 PROCEDURE — 99233 SBSQ HOSP IP/OBS HIGH 50: CPT

## 2017-08-18 RX ORDER — DEXTROSE 50 % IN WATER 50 %
1 SYRINGE (ML) INTRAVENOUS ONCE
Qty: 0 | Refills: 0 | Status: DISCONTINUED | OUTPATIENT
Start: 2017-08-18 | End: 2017-08-29

## 2017-08-18 RX ORDER — SODIUM CHLORIDE 9 MG/ML
500 INJECTION INTRAMUSCULAR; INTRAVENOUS; SUBCUTANEOUS ONCE
Qty: 0 | Refills: 0 | Status: COMPLETED | OUTPATIENT
Start: 2017-08-18 | End: 2017-08-18

## 2017-08-18 RX ORDER — ONDANSETRON 8 MG/1
8 TABLET, FILM COATED ORAL
Qty: 0 | Refills: 0 | Status: DISCONTINUED | OUTPATIENT
Start: 2017-08-18 | End: 2017-08-29

## 2017-08-18 RX ORDER — FERROUS SULFATE 325(65) MG
325 TABLET ORAL DAILY
Qty: 0 | Refills: 0 | Status: DISCONTINUED | OUTPATIENT
Start: 2017-08-18 | End: 2017-08-29

## 2017-08-18 RX ORDER — DIGOXIN 250 MCG
0.25 TABLET ORAL EVERY OTHER DAY
Qty: 0 | Refills: 0 | Status: DISCONTINUED | OUTPATIENT
Start: 2017-08-18 | End: 2017-08-18

## 2017-08-18 RX ORDER — SIMETHICONE 80 MG/1
80 TABLET, CHEWABLE ORAL EVERY 6 HOURS
Qty: 0 | Refills: 0 | Status: DISCONTINUED | OUTPATIENT
Start: 2017-08-18 | End: 2017-08-29

## 2017-08-18 RX ORDER — GLUCAGON INJECTION, SOLUTION 0.5 MG/.1ML
1 INJECTION, SOLUTION SUBCUTANEOUS ONCE
Qty: 0 | Refills: 0 | Status: DISCONTINUED | OUTPATIENT
Start: 2017-08-18 | End: 2017-08-29

## 2017-08-18 RX ORDER — DEXTROSE 50 % IN WATER 50 %
25 SYRINGE (ML) INTRAVENOUS ONCE
Qty: 0 | Refills: 0 | Status: DISCONTINUED | OUTPATIENT
Start: 2017-08-18 | End: 2017-08-29

## 2017-08-18 RX ORDER — SODIUM CHLORIDE 9 MG/ML
1000 INJECTION INTRAMUSCULAR; INTRAVENOUS; SUBCUTANEOUS ONCE
Qty: 0 | Refills: 0 | Status: COMPLETED | OUTPATIENT
Start: 2017-08-18 | End: 2017-08-18

## 2017-08-18 RX ORDER — PANTOPRAZOLE SODIUM 20 MG/1
40 TABLET, DELAYED RELEASE ORAL
Qty: 0 | Refills: 0 | Status: DISCONTINUED | OUTPATIENT
Start: 2017-08-18 | End: 2017-08-19

## 2017-08-18 RX ORDER — LOPERAMIDE HCL 2 MG
2 TABLET ORAL
Qty: 0 | Refills: 0 | Status: DISCONTINUED | OUTPATIENT
Start: 2017-08-18 | End: 2017-08-23

## 2017-08-18 RX ORDER — ATORVASTATIN CALCIUM 80 MG/1
40 TABLET, FILM COATED ORAL AT BEDTIME
Qty: 0 | Refills: 0 | Status: DISCONTINUED | OUTPATIENT
Start: 2017-08-18 | End: 2017-08-29

## 2017-08-18 RX ORDER — SODIUM CHLORIDE 9 MG/ML
1000 INJECTION, SOLUTION INTRAVENOUS
Qty: 0 | Refills: 0 | Status: DISCONTINUED | OUTPATIENT
Start: 2017-08-18 | End: 2017-08-29

## 2017-08-18 RX ORDER — DIGOXIN 250 MCG
0.12 TABLET ORAL EVERY OTHER DAY
Qty: 0 | Refills: 0 | Status: DISCONTINUED | OUTPATIENT
Start: 2017-08-18 | End: 2017-08-18

## 2017-08-18 RX ORDER — DIGOXIN 250 MCG
0.12 TABLET ORAL DAILY
Qty: 0 | Refills: 0 | Status: DISCONTINUED | OUTPATIENT
Start: 2017-08-18 | End: 2017-08-18

## 2017-08-18 RX ORDER — INSULIN LISPRO 100/ML
VIAL (ML) SUBCUTANEOUS AT BEDTIME
Qty: 0 | Refills: 0 | Status: DISCONTINUED | OUTPATIENT
Start: 2017-08-18 | End: 2017-08-29

## 2017-08-18 RX ORDER — INSULIN LISPRO 100/ML
VIAL (ML) SUBCUTANEOUS
Qty: 0 | Refills: 0 | Status: DISCONTINUED | OUTPATIENT
Start: 2017-08-18 | End: 2017-08-29

## 2017-08-18 RX ORDER — ACETAMINOPHEN 500 MG
650 TABLET ORAL EVERY 6 HOURS
Qty: 0 | Refills: 0 | Status: DISCONTINUED | OUTPATIENT
Start: 2017-08-18 | End: 2017-08-29

## 2017-08-18 RX ORDER — METOPROLOL TARTRATE 50 MG
5 TABLET ORAL ONCE
Qty: 0 | Refills: 0 | Status: COMPLETED | OUTPATIENT
Start: 2017-08-18 | End: 2017-08-18

## 2017-08-18 RX ORDER — DEXTROSE 50 % IN WATER 50 %
12.5 SYRINGE (ML) INTRAVENOUS ONCE
Qty: 0 | Refills: 0 | Status: DISCONTINUED | OUTPATIENT
Start: 2017-08-18 | End: 2017-08-29

## 2017-08-18 RX ADMIN — ATORVASTATIN CALCIUM 40 MILLIGRAM(S): 80 TABLET, FILM COATED ORAL at 22:34

## 2017-08-18 RX ADMIN — SODIUM CHLORIDE 2000 MILLILITER(S): 9 INJECTION INTRAMUSCULAR; INTRAVENOUS; SUBCUTANEOUS at 13:32

## 2017-08-18 RX ADMIN — SODIUM CHLORIDE 500 MILLILITER(S): 9 INJECTION INTRAMUSCULAR; INTRAVENOUS; SUBCUTANEOUS at 03:48

## 2017-08-18 RX ADMIN — ONDANSETRON 8 MILLIGRAM(S): 8 TABLET, FILM COATED ORAL at 19:38

## 2017-08-18 RX ADMIN — PANTOPRAZOLE SODIUM 40 MILLIGRAM(S): 20 TABLET, DELAYED RELEASE ORAL at 17:55

## 2017-08-18 RX ADMIN — SODIUM CHLORIDE 1000 MILLILITER(S): 9 INJECTION INTRAMUSCULAR; INTRAVENOUS; SUBCUTANEOUS at 09:07

## 2017-08-18 RX ADMIN — Medication 5 MILLIGRAM(S): at 23:02

## 2017-08-18 RX ADMIN — Medication 2 MILLIGRAM(S): at 19:38

## 2017-08-18 NOTE — CONSULT NOTE ADULT - PROBLEM SELECTOR RECOMMENDATION 9
likely sec to dehydration. improved with NS. check BMP stat. Na should not be corrected more than 10meq in 24 hrs. check urine na, osmo, tsh, cortisol

## 2017-08-18 NOTE — CONSULT NOTE ADULT - ATTENDING COMMENTS
likely volume depletion as cause of hypotension sec to overdiuresis and diarrhea, needs more IV fluids

## 2017-08-18 NOTE — CONSULT NOTE ADULT - SUBJECTIVE AND OBJECTIVE BOX
Dr. Lazcano (Nephrology)  Office [phone]  Cell [phone]  Elle VINCNET  Cell [phone]    HPI:   77 yo F, with PMH of CHF, A Fib, Diabetes, accompanied by her daughter/caregiver, presenting with diffuse abdominal pain, diarrhea, and nausea/vomiting x 1 day. Patient was discharged from the hospital 1 week ago, following a 1 week admission (08/03-08/10) for CHF. Daughter indicates patient has had low energy, loss of appetite, and infrequent BMs throughout the week. Diarrhea and vomiting episodes occurred around 2pm on the day of presentation. Vomiting was described as looking like stool. The pain is described as diffuse throughout the abdomen and patient states she had mild chest pain earlier in the day, but not currently.   Endorses diffuse abdominal pain, nausea, vomiting, diarrhea. Denies CP, SOB, HA, fever, urinary symptoms, back pain.		        Allergies:  clavulanate (Unknown)  erythromycin (Hives; Rash)      PAST MEDICAL & SURGICAL HISTORY:  CHF (congestive heart failure)  Hypertension  No significant past surgical history      Home Medications Reviewed    Hospital Medications:   MEDICATIONS  (STANDING):      SOCIAL HISTORY:  Denies ETOh, Smoking,     FAMILY HISTORY:  No pertinent family history in first degree relatives      REVIEW OF SYSTEMS:  CONSTITUTIONAL: + weakness, no fevers or chills  EYES/ENT: No visual changes;  No vertigo or throat pain   NECK: No pain or stiffness  RESPIRATORY: No cough, wheezing, hemoptysis; No shortness of breath  CARDIOVASCULAR: No chest pain or palpitations.  GASTROINTESTINAL: + abdominal or epigastric pain. + nausea, vomiting, diarrhea  GENITOURINARY: No dysuria, frequency, foamy urine, urinary urgency, incontinence or hematuria  NEUROLOGICAL: No numbness or weakness  SKIN: No itching, burning, rashes, or lesions   VASCULAR: No bilateral lower extremity edema.   All other review of systems is negative unless indicated above.    VITALS:  T(F): 98.6 (08-18-17 @ 13:31), Max: 98.6 (08-18-17 @ 13:31)  HR: 114 (08-18-17 @ 13:31)  BP: 85/48 (08-18-17 @ 13:31)  RR: 20 (08-18-17 @ 13:31)  SpO2: 99% (08-18-17 @ 13:31)  Wt(kg): --    08-17 @ 07:01  -  08-18 @ 07:00  --------------------------------------------------------  IN: 1250 mL / OUT: 0 mL / NET: 1250 mL          PHYSICAL EXAM:  Constitutional: lethargic  HEENT: anicteric sclera, oropharynx clear, MMM  Neck: No JVD  Respiratory: CTAB, no wheezes, rales or rhonchi  Cardiovascular: S1, S2, RRR  Gastrointestinal: BS+, soft, NT/ND  Extremities: No cyanosis or clubbing. No peripheral edema  Neurological: A/O x 3, no focal deficits  Psychiatric: Normal mood, normal affect  : No CVA tenderness. No crain.   Skin: No rashes  Vascular Access:    LABS:  08-17    133<L>  |  94<L>  |  108<H>  ----------------------------<  81  5.0   |  20<L>  |  2.27<H>    Ca    10.1      17 Aug 2017 23:41    TPro  8.0  /  Alb  3.9  /  TBili  0.7  /  DBili      /  AST  70<H>  /  ALT  18  /  AlkPhos  45  08-17    Creatinine Trend: 2.27 <--, 2.59 <--                        11.6   8.90  )-----------( 364      ( 17 Aug 2017 20:40 )             37.9     Urine Studies:        RADIOLOGY & ADDITIONAL STUDIES:

## 2017-08-18 NOTE — H&P ADULT - PMH
Atrial fibrillation  on coumadin  CHF (congestive heart failure)    Diabetes mellitus    Hypertension

## 2017-08-18 NOTE — H&P ADULT - PROBLEM SELECTOR PROBLEM 5
Amputee Clinic at [facility]    Patient was seen in the amputee clinic by Dr Verito Cueto at [facility] location for their lower extremity amputation and prosthesis care. Please refer to Dr Cueto's notes for detailed information regarding this appointment.  Patient was also screened by Physical Therapy with no therapy services recommended at this time.      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    Diabetes

## 2017-08-18 NOTE — ED ADULT NURSE REASSESSMENT NOTE - NS ED NURSE REASSESS COMMENT FT1
Patient a&o, NAD, no chest pain or SOB at this time. Patient HR on time of transport in Afib from 120-130s. Per previous RN Enedina VINCENT is aware of patient's HR, no medication orders. No change in assessment. Per previous RN accepting RN aware of patient status. Patient left unit at this time.
pt. alert, awake, daughter at bedside. Pt. cleaned of urine; has not had BM since arrival to ED. Rapid A-fib on CM - Tele PA Ezequiel aware; pt. digoxin d/c due to elevated creatinine and abnormal digoxin levels. Pt. BP previously low, unable to give other meds att per Tele PA. Pt. diet orders for clear liquid but okay to upgrade diet as tolerated by patient. Per Tele PA pt. will be removed from Contact precautions since condition no longer warrants it. Respirations even, unlabored. Rapid A-fib on CM. Pt. denies chest pain or SOB. Will continue to monitor.
pt. awake, alert, hypotensive, MD aware. IVF started as ordered. pt. currently denies any pain nor SOB. will continue to monitor
pt. awake, alert, oriented to person and place at this time, denies any pain nor SOB at this time. pt. admitted, awaits bed. will continue to monitor
rec'd pt. asleep but easily arousable, appears in NAD at this time, denies any pain nor SOB at this time, awaits CT results, daughter at bedside. will continue to monitor
rec'd pt. awake, oriented to person and place, with g20 saline lock on rt ac with no ss of infiltration. denies any pain at this time. noted BP low, MD aware. IVF started as ordered. will continue to monitor

## 2017-08-18 NOTE — H&P ADULT - NSHPSOCIALHISTORY_GEN_ALL_CORE
Marital Status:     Tobacco Use: smoked for 15 yrs, 3-4 cigarettes. Quitted 30 yrs ago.    ETOH Use: negative    Flu Vaccine: neg                                 Pneumonia Vaccine: neg

## 2017-08-18 NOTE — H&P ADULT - NSHPPHYSICALEXAM_GEN_ALL_CORE
Vital Signs Last 24 Hrs  T(C): 37 (18 Aug 2017 13:31), Max: 37 (18 Aug 2017 13:31)  T(F): 98.6 (18 Aug 2017 13:31), Max: 98.6 (18 Aug 2017 13:31)  HR: 114 (18 Aug 2017 13:31) (84 - 114)  BP: 85/48 (18 Aug 2017 13:31) (73/53 - 141/96)  BP(mean): --  RR: 20 (18 Aug 2017 13:31) (15 - 20)  SpO2: 99% (18 Aug 2017 13:31) (95% - 100%)    EKG: Afib @ 97, T inv I, AVL, V5-6

## 2017-08-18 NOTE — CONSULT NOTE ADULT - ASSESSMENT
Patient is a 77 y/o F w/ a PMHx of CHF, A-fib on Coumadin, T2DM, and vertigo who presented to the ED with nausea, vomiting, diarrhea, and abdominal pain x1 day, found to be hypotensive in the ED. Patient is a 75 y/o F w/ a PMHx of CHF, A-fib on Coumadin, T2DM, and vertigo who presented to the ED with nausea, vomiting, diarrhea, and abdominal pain x1 day, found to be hypotensive in the ED.    - Pt presented to the ED with vomiting and diarrhea, found to be hypotensive that has been mildly responsive to IVF. Of note, pt was recently discharged from the hospital last week where her home Lasix dose was increased to 40mg BID. Suspect pt's hypotension is hypovolemic in nature given her vomiting, diarrhea, and possible overdiuresis. In addition, POCUS was performed at bedside and it was c/w hypovolemia. Recommend giving the pt further IVF and reassessing her BP.  - Aside from pt's soft BP, she is denying any acute complaints and is mentating well. Pt does require an ICU level of care at this time. Please reconsult if pt's status changes. Patient is a 77 y/o F w/ a PMHx of CHF, A-fib on Coumadin, T2DM, and vertigo who presented to the ED with nausea, vomiting, diarrhea, and abdominal pain x1 day, found to be hypotensive in the ED.    - Pt presented to the ED with vomiting and diarrhea, found to be hypotensive that has been mildly responsive to IVF. Of note, pt was recently discharged from the hospital last week where her home Lasix dose was increased to 40mg BID. Suspect pt's hypotension is hypovolemic in nature given her vomiting, diarrhea, and possible overdiuresis. In addition, POCUS was performed at bedside and it was c/w hypovolemia. Recommend giving the pt further IVF and reassessing her BP.  - Aside from pt's soft BP, she is denying any acute complaints and is mentating well. Pt does not require an ICU level of care at this time. Please reconsult if pt's status changes.

## 2017-08-18 NOTE — H&P ADULT - PROBLEM SELECTOR PLAN 6
rate control, monitor inr, adjust coumadin based on INR rate control, monitor inr, adjust coumadin based on INR  elevated Digoxin - hold Digoxin

## 2017-08-18 NOTE — CONSULT NOTE ADULT - PROBLEM SELECTOR RECOMMENDATION 4
severe LV dysfunction on JULIANO 8/17, was started on lasix 40 bid. clinically euvolemic. hold lasix

## 2017-08-18 NOTE — H&P ADULT - RS GEN PE MLT RESP DETAILS PC
no wheezes/breath sounds equal/respirations non-labored/no intercostal retractions/clear to auscultation bilaterally/no rhonchi/no chest wall tenderness/airway patent

## 2017-08-18 NOTE — H&P ADULT - PROBLEM SELECTOR PLAN 3
not in acute failure at this time, daily weights  holding lasix due to krysten not in acute failure at this time, daily weights  holding Lasix due to ARF

## 2017-08-18 NOTE — H&P ADULT - PROBLEM SELECTOR PLAN 4
monitor bun/cr, avoid nephrotoxic agents, renal c/s Dr. Lazcano monitor bun/cr, avoid nephrotoxic agents, renal c/s Dr. Fox

## 2017-08-18 NOTE — CONSULT NOTE ADULT - SUBJECTIVE AND OBJECTIVE BOX
CHIEF COMPLAINT:    HPI:    PAST MEDICAL & SURGICAL HISTORY:  CHF (congestive heart failure)  Hypertension  No significant past surgical history      FAMILY HISTORY:  No pertinent family history in first degree relatives      SOCIAL HISTORY:  Smoking: [ ] Never Smoked [ ] Former Smoker (__ packs x ___ years) [ ] Current Smoker  (__ packs x ___ years)  Substance Use: [ ] Never Used [ ] Used ____  EtOH Use:  Marital Status: [ ] Single [ ]  [ ]  [ ]   Sexual History:   Occupation:  Recent Travel:  Country of Birth:  Advance Directives:    Allergies    clavulanate (Unknown)  erythromycin (Hives; Rash)    Intolerances        HOME MEDICATIONS:    REVIEW OF SYSTEMS:  Constitutional: [ ] fevers [ ] chills [ ] weight loss [ ] weight gain  HEENT: [ ] dry eyes [ ] eye irritation [ ] postnasal drip [ ] nasal congestion  CV: [ ] chest pain [ ] orthopnea [ ] palpitations [ ] murmur  Resp: [ ] cough [ ] shortness of breath [ ] dyspnea [ ] wheezing [ ] sputum [ ] hemoptysis  GI: [ ] nausea [ ] vomiting [ ] diarrhea [ ] constipation [ ] abd pain [ ] dysphagia   : [ ] dysuria [ ] nocturia [ ] hematuria [ ] increased urinary frequency  Musculoskeletal: [ ] back pain [ ] myalgias [ ] arthralgias [ ] fracture  Skin: [ ] rash [ ] itch  Neurological: [ ] headache [ ] dizziness [ ] syncope [ ] weakness [ ] numbness  Psychiatric: [ ] anxiety [ ] depression  Endocrine: [ ] diabetes [ ] thyroid problem  Hematologic/Lymphatic: [ ] anemia [ ] bleeding problem  Allergic/Immunologic: [ ] itchy eyes [ ] nasal discharge [ ] hives [ ] angioedema  [ ] All other systems negative  [ ] Unable to assess ROS because ________    OBJECTIVE:  ICU Vital Signs Last 24 Hrs  T(C): 37 (18 Aug 2017 13:31), Max: 37 (18 Aug 2017 13:31)  T(F): 98.6 (18 Aug 2017 13:31), Max: 98.6 (18 Aug 2017 13:31)  HR: 114 (18 Aug 2017 13:31) (84 - 114)  BP: 85/48 (18 Aug 2017 13:31) (73/53 - 141/96)  BP(mean): --  ABP: --  ABP(mean): --  RR: 20 (18 Aug 2017 13:31) (15 - 20)  SpO2: 99% (18 Aug 2017 13:31) (95% - 100%)        08-17 @ 07:01  -  08-18 @ 07:00  --------------------------------------------------------  IN: 1250 mL / OUT: 0 mL / NET: 1250 mL      CAPILLARY BLOOD GLUCOSE  94 (17 Aug 2017 18:33)          PHYSICAL EXAM:  General:   HEENT:   Lymph Nodes:  Neck:   Respiratory:   Cardiovascular:   Abdomen:   Extremities:   Skin:   Neurological:  Psychiatry:    LINES:     HOSPITAL MEDICATIONS:  MEDICATIONS  (STANDING):    MEDICATIONS  (PRN):      LABS:                        11.6   8.90  )-----------( 364      ( 17 Aug 2017 20:40 )             37.9     Hgb Trend: 11.6<--  08-17    133<L>  |  94<L>  |  108<H>  ----------------------------<  81  5.0   |  20<L>  |  2.27<H>    Ca    10.1      17 Aug 2017 23:41    TPro  8.0  /  Alb  3.9  /  TBili  0.7  /  DBili  x   /  AST  70<H>  /  ALT  18  /  AlkPhos  45  08-17    Creatinine Trend: 2.27<--, 2.59<--, 1.49<--, 1.01<--, 1.22<--, 1.26<--  PT/INR - ( 17 Aug 2017 20:40 )   PT: 52.1 SEC;   INR: 4.51          PTT - ( 17 Aug 2017 20:40 )  PTT:31.2 SEC      Venous Blood Gas:  08-17 @ 20:40  7.29/47/< 24/19/18.3  VBG Lactate: 3.2      MICROBIOLOGY:     RADIOLOGY:  [ ] Reviewed and interpreted by me    EKG: CHIEF COMPLAINT: Hypotension    HPI: Patient is a 75 y/o F w/ a PMHx of CHF, A-fib on Coumadin, T2DM, and vertigo who presented to the ED with nausea, vomiting, diarrhea, and abdominal pain x1 day. Patient was recently discharged from the hospital 1 week prior to presentation following a 1 week admission for CHF exacerbation. During the hospitalization, pt had a cardiac cath which showed non-obstructive CAD. She also had a JULIANO which showed severe global LVSF, moderate AS, mild-moderate MR, and mild pulmonary HTN. Pt's home Lasix was increased to 40mg BID prior to discharge. After discharge, pt has had low energy and loss of appetite. In addition, pt began having episodes of nausea, vomiting, and diarrhea yesterday. Pt endorsed some mild non-radiating chest pain and diffuse abdominal pain yesterday as well. Patient was then brought to the ED by her daughter for further evaluation. In the ED, pt's initial VS were: T = 98F, HR = 110, BP = 141/96, RR = 18, O2 Sat = 100% on RA. Labs were significant for SHELBY on CKD (Cr = 2.59), supratherapeutic INR (4.51), negative Troponin, and pro-BNP of 1274 (prior was 5124 on 7/30. CXR showed clear lungs. CT A+P showed a normal appendix and no bowel obstruction or free air. While in the ED, pt's BP decreased to as low as 73/53. Pt was given 1.75L NS and her SBP remained around 80s-90s. MICU was then consulted for further evaluation. At time of visit, pt was mentating well. She is denying any acute complaints currently. She denies any chest pain, shortness of breath, nausea, or abdominal pain. Patient mentioned that she experienced some palpitations yesterday, but she denies any palpitations currently.    PAST MEDICAL & SURGICAL HISTORY:  CHF (congestive heart failure)  Hypertension  No significant past surgical history    FAMILY HISTORY:  No pertinent family history in first degree relatives  smoked for 15 yrs, 3-4 cigarettes. Quitted 30 yrs ago.    SOCIAL HISTORY:  Smoking: [ ] Never Smoked [X] Former Smoker (3-4 cigarettes x 15 years, Quit 30 years ago) [ ] Current Smoker  (__ packs x ___ years)  Substance Use: [X] Never Used [ ] Used ____  EtOH Use: Denies  Marital Status: [ ] Single [ ]  [ ]  [ ]   Sexual History:   Occupation:  Recent Travel:  Country of Birth:  Advance Directives:    Allergies    clavulanate (Unknown)  erythromycin (Hives; Rash)    Intolerances    HOME MEDICATIONS:  ASA  Lisinopril  Digoxin  Warfarin  GLipizide  Metformin  Meclizine  Lipitor  Lopressor  Lasix  Ferrous Sulfate  Simethicone    REVIEW OF SYSTEMS:  Constitutional: [-] fevers [-] chills [ ] weight loss [ ] weight gain  HEENT: [ ] dry eyes [ ] eye irritation [ ] postnasal drip [ ] nasal congestion  CV: [+] chest pain [ ] orthopnea [+] palpitations [ ] murmur  Resp: [ ] cough [-] shortness of breath [-] dyspnea [ ] wheezing [ ] sputum [ ] hemoptysis  GI: [+] nausea [+] vomiting [+] diarrhea [ ] constipation [+] abd pain [ ] dysphagia   : [ ] dysuria [ ] nocturia [ ] hematuria [ ] increased urinary frequency  Musculoskeletal: [ ] back pain [ ] myalgias [ ] arthralgias [ ] fracture  Skin: [ ] rash [ ] itch  Neurological: [-] headache [-] dizziness [ ] syncope [ ] weakness [ ] numbness  Psychiatric: [ ] anxiety [ ] depression  Endocrine: [ ] diabetes [ ] thyroid problem  Hematologic/Lymphatic: [ ] anemia [ ] bleeding problem  Allergic/Immunologic: [ ] itchy eyes [ ] nasal discharge [ ] hives [ ] angioedema  [ ] All other systems negative  [ ] Unable to assess ROS because ________    OBJECTIVE:  ICU Vital Signs Last 24 Hrs  T(C): 37 (18 Aug 2017 13:31), Max: 37 (18 Aug 2017 13:31)  T(F): 98.6 (18 Aug 2017 13:31), Max: 98.6 (18 Aug 2017 13:31)  HR: 114 (18 Aug 2017 13:31) (84 - 114)  BP: 85/48 (18 Aug 2017 13:31) (73/53 - 141/96)  BP(mean): --  ABP: --  ABP(mean): --  RR: 20 (18 Aug 2017 13:31) (15 - 20)  SpO2: 99% (18 Aug 2017 13:31) (95% - 100%)        08-17 @ 07:01  -  08-18 @ 07:00  --------------------------------------------------------  IN: 1250 mL / OUT: 0 mL / NET: 1250 mL      CAPILLARY BLOOD GLUCOSE  94 (17 Aug 2017 18:33)    PHYSICAL EXAM:  General: NAD  HEENT: NC/AT  Lymph Nodes: No noted lymphadenopathy  Neck: Supple  Respiratory: Grossly CTAB; No wheeze  Cardiovascular: Tachycardic, Irregular rhythm  Abdomen: +BS, Soft, NT  Extremities: No significant LE edema  Skin: Warm and dry  Neurological: A+Ox2-3, Nonfocal  Psychiatry: Normal mood and affect    LINES: [facility]    HOSPITAL MEDICATIONS:  MEDICATIONS  (STANDING):    MEDICATIONS  (PRN):      LABS:                        11.6   8.90  )-----------( 364      ( 17 Aug 2017 20:40 )             37.9     Hgb Trend: 11.6<--  08-17    133<L>  |  94<L>  |  108<H>  ----------------------------<  81  5.0   |  20<L>  |  2.27<H>    Ca    10.1      17 Aug 2017 23:41    TPro  8.0  /  Alb  3.9  /  TBili  0.7  /  DBili  x   /  AST  70<H>  /  ALT  18  /  AlkPhos  45  08-17    Creatinine Trend: 2.27<--, 2.59<--, 1.49<--, 1.01<--, 1.22<--, 1.26<--  PT/INR - ( 17 Aug 2017 20:40 )   PT: 52.1 SEC;   INR: 4.51          PTT - ( 17 Aug 2017 20:40 )  PTT:31.2 SEC      Venous Blood Gas:  08-17 @ 20:40  7.29/47/< 24/19/18.3  VBG Lactate: 3.2    RADIOLOGY:  CXR: Clear lungs. No pleural effusions or pneumothorax. Aortic arch calcifications. Otherwise cardiac and mediastinal silhouettes within normal limits. Trachea midline. Generalized osteopenia and spinal bilateral shoulder degenerative changes again noted.    CT A+P: Normal appendix. No bowel obstruction or free air. Grade 2 L4-L5 anterolisthesis due to severe facet/ligament degeneration.     EKG: A-fib (rate = 97 bpm), QT/QTc = 310/393, Grossly unchanged from prior

## 2017-08-18 NOTE — CHART NOTE - NSCHARTNOTEFT_GEN_A_CORE
Patient noted to have HR 110s-120s on telemetry, asymptomatic.  Will give dose Lopressor IV 5mg and monitor.    Sherry VINCENT  [id number] Vital Signs   HR: 121  BP: 122/55   RR: 18   SpO2: 99%     Patient noted to have HR 110s-120s on telemetry, asymptomatic.  Will give dose Lopressor IV 5mg and monitor.    Sherry VINCENT  [id number]

## 2017-08-18 NOTE — H&P ADULT - NEGATIVE CARDIOVASCULAR SYMPTOMS
no palpitations/no chest pain/no orthopnea/no peripheral edema/no paroxysmal nocturnal dyspnea/no dyspnea on exertion

## 2017-08-18 NOTE — CONSULT NOTE ADULT - ASSESSMENT
77 yo F, with PMH of CHF, A Fib, Diabetes, accompanied by her daughter/caregiver, presenting with diffuse abdominal pain, diarrhea, and nausea/vomiting x 1 day. Patient was discharged from the hospital 1 week ago, following a 1 week admission (08/03-08/10) for CHF. was started on lasix 40mg bid last admission .

## 2017-08-18 NOTE — H&P ADULT - ASSESSMENT
75 y/o female, with a PmHx of Afib on coumadin, DM, CHF, is being admitted to telemetry for hypotension, krysten and abd pain.

## 2017-08-18 NOTE — H&P ADULT - HISTORY OF PRESENT ILLNESS
75 y/o female, with a PmHx of Afib on coumadin, DM, CHF, presented to [facility] ED c/o nausea/vomiting and abdominal pain. Pt states for the past 2 days she has been having diffuse abdominal pain but is worse in the lower left quadrant of her abdomen and is associated with nausea/vomiting and watery diarrhea. Pt states she has had no energy and has had a poor appetite. She denies any fever, chills, chest pain, HA, dizziness, blurred vision. She was recently discharged from [facility] 1 week ago after being admitted to telemetry for CHF exacerbation and chest pain. At that time she had an TTE, JULIANO and a cardiac cath that was done that showed normal coronaries. Currently, she states the abdominal pain has subsided. In the ED today, she was found to be hypotensive to 73/53 and occult positive. She is being admitted to telemetry for further management.

## 2017-08-19 DIAGNOSIS — D68.9 COAGULATION DEFECT, UNSPECIFIED: ICD-10-CM

## 2017-08-19 DIAGNOSIS — D50.0 IRON DEFICIENCY ANEMIA SECONDARY TO BLOOD LOSS (CHRONIC): ICD-10-CM

## 2017-08-19 LAB
APTT BLD: 39.3 SEC — HIGH (ref 27.5–37.4)
BLD GP AB SCN SERPL QL: NEGATIVE — SIGNIFICANT CHANGE UP
BUN SERPL-MCNC: 102 MG/DL — HIGH (ref 7–23)
CALCIUM SERPL-MCNC: 8.8 MG/DL — SIGNIFICANT CHANGE UP (ref 8.4–10.5)
CHLORIDE SERPL-SCNC: 110 MMOL/L — HIGH (ref 98–107)
CK MB BLD-MCNC: 2.03 NG/ML — SIGNIFICANT CHANGE UP (ref 1–4.7)
CK MB BLD-MCNC: SIGNIFICANT CHANGE UP (ref 0–2.5)
CO2 SERPL-SCNC: 19 MMOL/L — LOW (ref 22–31)
CREAT SERPL-MCNC: 1.48 MG/DL — HIGH (ref 0.5–1.3)
DIGOXIN SERPL-MCNC: 1.1 NG/ML — SIGNIFICANT CHANGE UP (ref 0.8–2)
GLUCOSE SERPL-MCNC: 198 MG/DL — HIGH (ref 70–99)
HCT VFR BLD CALC: 22.1 % — LOW (ref 34.5–45)
HGB BLD-MCNC: 6.7 G/DL — CRITICAL LOW (ref 11.5–15.5)
INR BLD: 5.17 — CRITICAL HIGH (ref 0.88–1.17)
MCHC RBC-ENTMCNC: 20.3 PG — LOW (ref 27–34)
MCHC RBC-ENTMCNC: 30.3 % — LOW (ref 32–36)
MCV RBC AUTO: 67 FL — LOW (ref 80–100)
NRBC # FLD: 0 — SIGNIFICANT CHANGE UP
PLATELET # BLD AUTO: 282 K/UL — SIGNIFICANT CHANGE UP (ref 150–400)
PMV BLD: 10.4 FL — SIGNIFICANT CHANGE UP (ref 7–13)
POTASSIUM SERPL-MCNC: 5 MMOL/L — SIGNIFICANT CHANGE UP (ref 3.5–5.3)
POTASSIUM SERPL-SCNC: 5 MMOL/L — SIGNIFICANT CHANGE UP (ref 3.5–5.3)
PROTHROM AB SERPL-ACNC: 59.9 SEC — HIGH (ref 9.8–13.1)
RBC # BLD: 3.3 M/UL — LOW (ref 3.8–5.2)
RBC # FLD: 17.8 % — HIGH (ref 10.3–14.5)
RH IG SCN BLD-IMP: POSITIVE — SIGNIFICANT CHANGE UP
SODIUM SERPL-SCNC: 144 MMOL/L — SIGNIFICANT CHANGE UP (ref 135–145)
TROPONIN T SERPL-MCNC: < 0.06 NG/ML — SIGNIFICANT CHANGE UP (ref 0–0.06)
WBC # BLD: 9.02 K/UL — SIGNIFICANT CHANGE UP (ref 3.8–10.5)
WBC # FLD AUTO: 9.02 K/UL — SIGNIFICANT CHANGE UP (ref 3.8–10.5)

## 2017-08-19 RX ORDER — FUROSEMIDE 40 MG
20 TABLET ORAL ONCE
Qty: 0 | Refills: 0 | Status: COMPLETED | OUTPATIENT
Start: 2017-08-19 | End: 2017-08-19

## 2017-08-19 RX ORDER — PANTOPRAZOLE SODIUM 20 MG/1
8 TABLET, DELAYED RELEASE ORAL
Qty: 80 | Refills: 0 | Status: DISCONTINUED | OUTPATIENT
Start: 2017-08-19 | End: 2017-08-20

## 2017-08-19 RX ADMIN — Medication 8: at 13:18

## 2017-08-19 RX ADMIN — PANTOPRAZOLE SODIUM 10 MG/HR: 20 TABLET, DELAYED RELEASE ORAL at 13:19

## 2017-08-19 RX ADMIN — ATORVASTATIN CALCIUM 40 MILLIGRAM(S): 80 TABLET, FILM COATED ORAL at 21:27

## 2017-08-19 RX ADMIN — Medication 4: at 09:53

## 2017-08-19 RX ADMIN — Medication 2: at 18:00

## 2017-08-19 RX ADMIN — PANTOPRAZOLE SODIUM 10 MG/HR: 20 TABLET, DELAYED RELEASE ORAL at 21:27

## 2017-08-19 RX ADMIN — ONDANSETRON 8 MILLIGRAM(S): 8 TABLET, FILM COATED ORAL at 21:27

## 2017-08-19 RX ADMIN — Medication 2 MILLIGRAM(S): at 19:01

## 2017-08-19 RX ADMIN — Medication 20 MILLIGRAM(S): at 16:43

## 2017-08-19 RX ADMIN — Medication 325 MILLIGRAM(S): at 12:16

## 2017-08-19 RX ADMIN — PANTOPRAZOLE SODIUM 10 MG/HR: 20 TABLET, DELAYED RELEASE ORAL at 12:16

## 2017-08-19 NOTE — PROVIDER CONTACT NOTE (OTHER) - BACKGROUND
Pt is a 75 y/o female admitted for nausea, vomiting and abdominal pain. Hx - DM, CHF,  Afib, SHELBY, hypertension. AM - H/H - 6.7/22.1

## 2017-08-19 NOTE — PROVIDER CONTACT NOTE (OTHER) - ASSESSMENT
Pt reports feeling "weak". Awake, alert and oriented x 3. Denies SOB/Chest pain, denies palpitations,

## 2017-08-19 NOTE — CONSULT NOTE ADULT - ASSESSMENT
critically ill 76 f with HTN, AFIB, Chronic systolic chf, non obstructive cad, mod AS   now with abd pain, n/v, tachycardia , acute anemia , pos occult blood in stools, elevated INR  agree that she appears hypovolemic but given acute anemia, and possibility of sepsis  in setting of hypotension and tachycardia, MICU is more an appropriate setting for treatment and diagnostic evaluation  would re check CBC and transfuse PRBC.   GI eval  metoprolol is on hold due to hypotension  Dig on hold due to elevated level   HR INR has been erratic and not consistently in normal range , hence conversion ( electrically or chemically ) would put her at risk of embolic phenomenon potentially and should be kept for last resort   Her systolic chf is chronic  if She remains hypotensive despite transfusion recommend ICU transfer for presser support ( phenylephrine given her Mod AS or low dose levophed ) and optimal evaluation for her abd pain, and acute anemia

## 2017-08-19 NOTE — CONSULT NOTE ADULT - SUBJECTIVE AND OBJECTIVE BOX
CHIEF COMPLAINT: weakness, n/v, abd pain     HISTORY OF PRESENT ILLNESS:  7 y/o female, with a PmHx of Afib on coumadin, DM, CHF, presents with abd pain, n/v for few days, generalized weakness   no chest pain or sob       PAST MEDICAL & SURGICAL HISTORY:  Diabetes mellitus  Atrial fibrillation: on coumadin  CHF (congestive heart failure)  Hypertension  No significant past surgical history          MEDICATIONS:        acetaminophen   Tablet. 650 milliGRAM(s) Oral every 6 hours PRN  ondansetron    Tablet 8 milliGRAM(s) Oral two times a day    simethicone 80 milliGRAM(s) Chew every 6 hours PRN  pantoprazole    Tablet 40 milliGRAM(s) Oral two times a day before meals  loperamide 2 milliGRAM(s) Oral two times a day    atorvastatin 40 milliGRAM(s) Oral at bedtime  insulin lispro (HumaLOG) corrective regimen sliding scale   SubCutaneous three times a day before meals  insulin lispro (HumaLOG) corrective regimen sliding scale   SubCutaneous at bedtime  dextrose Gel 1 Dose(s) Oral once PRN  dextrose 50% Injectable 12.5 Gram(s) IV Push once  dextrose 50% Injectable 25 Gram(s) IV Push once  dextrose 50% Injectable 25 Gram(s) IV Push once  glucagon  Injectable 1 milliGRAM(s) IntraMuscular once PRN    ferrous    sulfate 325 milliGRAM(s) Oral daily  dextrose 5%. 1000 milliLiter(s) IV Continuous <Continuous>      FAMILY HISTORY:  No pertinent family history in first degree relatives      Non-contributory    SOCIAL HISTORY:    No tobacco, drugs or etoh    Allergies    clavulanate (Unknown)  erythromycin (Hives; Rash)    Intolerances    	    REVIEW OF SYSTEMS:  as above  The rest of the 14 points ROS reviewed and except above they are unremarkable.        PHYSICAL EXAM:  T(C): 36.8 (08-19-17 @ 06:14), Max: 37.2 (08-19-17 @ 03:27)  HR: 133 (08-19-17 @ 06:14) (96 - 133)  BP: 94/41 (08-19-17 @ 06:14) (85/48 - 126/73)  RR: 18 (08-19-17 @ 06:14) (14 - 20)  SpO2: 96% (08-19-17 @ 06:14) (96% - 100%)  Wt(kg): --  I&O's Summary    18 Aug 2017 07:01  -  19 Aug 2017 07:00  --------------------------------------------------------  IN: 200 mL / OUT: 0 mL / NET: 200 mL        Appearance: mildly tachypneic 	  HEENT:   Normal oral mucosa, PERRL, EOMI	  Cardiovascular: Normal S1 S2,    Murmur:   Neck: JVP normal  Respiratory: Lungs clear to auscultation  Gastrointestinal:  Soft, Non-tender, + BS	  Skin: normal   Neuro: No gross deficits.   Psychiatry:  Mood & affect ill  Ext: No edema    TELEMETRY: 	    ECG:  	  RADIOLOGY:  OTHER: 	  	  LABS:	 	    CARDIAC MARKERS:  Troponin T, Serum: < 0.06 ng/mL (08-18 @ 17:45)  Troponin T, Serum: < 0.06 ng/mL (08-17 @ 20:40)      CKMB: 2.03 ng/mL (08-18 @ 17:45)                              7.9    8.88  )-----------( 305      ( 18 Aug 2017 17:45 )             25.8     08-18    138  |  104  |  101<H>  ----------------------------<  164<H>  4.6   |  19<L>  |  1.55<H>    Ca    8.9      18 Aug 2017 17:45  Phos  2.2     08-18  Mg     1.7     08-18    TPro  5.9<L>  /  Alb  3.2<L>  /  TBili  0.5  /  DBili  x   /  AST  15  /  ALT  11  /  AlkPhos  41  08-18    proBNP: Serum Pro-Brain Natriuretic Peptide: 1274 pg/mL (08-17 @ 20:40)    Lipid Profile:   HgA1c:   TSH:

## 2017-08-20 DIAGNOSIS — E83.39 OTHER DISORDERS OF PHOSPHORUS METABOLISM: ICD-10-CM

## 2017-08-20 LAB
ALBUMIN SERPL ELPH-MCNC: 2.8 G/DL — LOW (ref 3.3–5)
ALP SERPL-CCNC: 34 U/L — LOW (ref 40–120)
ALT FLD-CCNC: 11 U/L — SIGNIFICANT CHANGE UP (ref 4–33)
AST SERPL-CCNC: 16 U/L — SIGNIFICANT CHANGE UP (ref 4–32)
BILIRUB SERPL-MCNC: 1.1 MG/DL — SIGNIFICANT CHANGE UP (ref 0.2–1.2)
BUN SERPL-MCNC: 70 MG/DL — HIGH (ref 7–23)
CALCIUM SERPL-MCNC: 8.8 MG/DL — SIGNIFICANT CHANGE UP (ref 8.4–10.5)
CHLORIDE SERPL-SCNC: 108 MMOL/L — HIGH (ref 98–107)
CO2 SERPL-SCNC: 20 MMOL/L — LOW (ref 22–31)
CREAT SERPL-MCNC: 1.33 MG/DL — HIGH (ref 0.5–1.3)
DIGOXIN SERPL-MCNC: 0.9 NG/ML — SIGNIFICANT CHANGE UP (ref 0.8–2)
GLUCOSE SERPL-MCNC: 184 MG/DL — HIGH (ref 70–99)
HCT VFR BLD CALC: 24.8 % — LOW (ref 34.5–45)
HCT VFR BLD CALC: 25.5 % — LOW (ref 34.5–45)
HCT VFR BLD CALC: 26.5 % — LOW (ref 34.5–45)
HGB BLD-MCNC: 7.9 G/DL — LOW (ref 11.5–15.5)
HGB BLD-MCNC: 8.5 G/DL — LOW (ref 11.5–15.5)
HGB BLD-MCNC: 8.7 G/DL — LOW (ref 11.5–15.5)
INR BLD: 4.39 — HIGH (ref 0.88–1.17)
MAGNESIUM SERPL-MCNC: 1.6 MG/DL — SIGNIFICANT CHANGE UP (ref 1.6–2.6)
MCHC RBC-ENTMCNC: 23.2 PG — LOW (ref 27–34)
MCHC RBC-ENTMCNC: 23.8 PG — LOW (ref 27–34)
MCHC RBC-ENTMCNC: 24.2 PG — LOW (ref 27–34)
MCHC RBC-ENTMCNC: 31.9 % — LOW (ref 32–36)
MCHC RBC-ENTMCNC: 32.8 % — SIGNIFICANT CHANGE UP (ref 32–36)
MCHC RBC-ENTMCNC: 33.3 % — SIGNIFICANT CHANGE UP (ref 32–36)
MCV RBC AUTO: 72.6 FL — LOW (ref 80–100)
MCV RBC AUTO: 72.6 FL — LOW (ref 80–100)
MCV RBC AUTO: 72.9 FL — LOW (ref 80–100)
NRBC # FLD: 0 — SIGNIFICANT CHANGE UP
NRBC # FLD: 0.02 — SIGNIFICANT CHANGE UP
NRBC # FLD: 0.03 — SIGNIFICANT CHANGE UP
PHOSPHATE SERPL-MCNC: 1.9 MG/DL — LOW (ref 2.5–4.5)
PLATELET # BLD AUTO: 208 K/UL — SIGNIFICANT CHANGE UP (ref 150–400)
PLATELET # BLD AUTO: 222 K/UL — SIGNIFICANT CHANGE UP (ref 150–400)
PLATELET # BLD AUTO: 249 K/UL — SIGNIFICANT CHANGE UP (ref 150–400)
PMV BLD: 10.5 FL — SIGNIFICANT CHANGE UP (ref 7–13)
PMV BLD: 10.7 FL — SIGNIFICANT CHANGE UP (ref 7–13)
PMV BLD: 9.5 FL — SIGNIFICANT CHANGE UP (ref 7–13)
POTASSIUM SERPL-MCNC: 4.4 MMOL/L — SIGNIFICANT CHANGE UP (ref 3.5–5.3)
POTASSIUM SERPL-SCNC: 4.4 MMOL/L — SIGNIFICANT CHANGE UP (ref 3.5–5.3)
PROT SERPL-MCNC: 5.3 G/DL — LOW (ref 6–8.3)
PROTHROM AB SERPL-ACNC: 50.7 SEC — HIGH (ref 9.8–13.1)
RBC # BLD: 3.4 M/UL — LOW (ref 3.8–5.2)
RBC # BLD: 3.51 M/UL — LOW (ref 3.8–5.2)
RBC # BLD: 3.65 M/UL — LOW (ref 3.8–5.2)
RBC # FLD: 21 % — HIGH (ref 10.3–14.5)
RBC # FLD: 21.2 % — HIGH (ref 10.3–14.5)
RBC # FLD: 21.2 % — HIGH (ref 10.3–14.5)
SODIUM SERPL-SCNC: 142 MMOL/L — SIGNIFICANT CHANGE UP (ref 135–145)
WBC # BLD: 10.21 K/UL — SIGNIFICANT CHANGE UP (ref 3.8–10.5)
WBC # BLD: 11.07 K/UL — HIGH (ref 3.8–10.5)
WBC # BLD: 11.39 K/UL — HIGH (ref 3.8–10.5)
WBC # FLD AUTO: 10.21 K/UL — SIGNIFICANT CHANGE UP (ref 3.8–10.5)
WBC # FLD AUTO: 11.07 K/UL — HIGH (ref 3.8–10.5)
WBC # FLD AUTO: 11.39 K/UL — HIGH (ref 3.8–10.5)

## 2017-08-20 RX ORDER — FUROSEMIDE 40 MG
20 TABLET ORAL ONCE
Qty: 0 | Refills: 0 | Status: COMPLETED | OUTPATIENT
Start: 2017-08-20 | End: 2017-08-21

## 2017-08-20 RX ORDER — PANTOPRAZOLE SODIUM 20 MG/1
40 TABLET, DELAYED RELEASE ORAL
Qty: 0 | Refills: 0 | Status: DISCONTINUED | OUTPATIENT
Start: 2017-08-20 | End: 2017-08-29

## 2017-08-20 RX ORDER — FUROSEMIDE 40 MG
40 TABLET ORAL
Qty: 0 | Refills: 0 | Status: DISCONTINUED | OUTPATIENT
Start: 2017-08-20 | End: 2017-08-24

## 2017-08-20 RX ORDER — POTASSIUM PHOSPHATE, MONOBASIC POTASSIUM PHOSPHATE, DIBASIC 236; 224 MG/ML; MG/ML
15 INJECTION, SOLUTION INTRAVENOUS ONCE
Qty: 0 | Refills: 0 | Status: COMPLETED | OUTPATIENT
Start: 2017-08-20 | End: 2017-08-20

## 2017-08-20 RX ADMIN — Medication 325 MILLIGRAM(S): at 13:14

## 2017-08-20 RX ADMIN — Medication 2: at 18:18

## 2017-08-20 RX ADMIN — PANTOPRAZOLE SODIUM 10 MG/HR: 20 TABLET, DELAYED RELEASE ORAL at 06:31

## 2017-08-20 RX ADMIN — Medication 4: at 09:09

## 2017-08-20 RX ADMIN — ATORVASTATIN CALCIUM 40 MILLIGRAM(S): 80 TABLET, FILM COATED ORAL at 21:23

## 2017-08-20 RX ADMIN — ONDANSETRON 8 MILLIGRAM(S): 8 TABLET, FILM COATED ORAL at 18:18

## 2017-08-20 RX ADMIN — Medication 2 MILLIGRAM(S): at 05:18

## 2017-08-20 RX ADMIN — Medication 2 MILLIGRAM(S): at 18:18

## 2017-08-20 RX ADMIN — POTASSIUM PHOSPHATE, MONOBASIC POTASSIUM PHOSPHATE, DIBASIC 62.5 MILLIMOLE(S): 236; 224 INJECTION, SOLUTION INTRAVENOUS at 13:14

## 2017-08-20 RX ADMIN — ONDANSETRON 8 MILLIGRAM(S): 8 TABLET, FILM COATED ORAL at 05:18

## 2017-08-20 RX ADMIN — Medication: at 13:15

## 2017-08-20 RX ADMIN — PANTOPRAZOLE SODIUM 40 MILLIGRAM(S): 20 TABLET, DELAYED RELEASE ORAL at 18:18

## 2017-08-20 RX ADMIN — Medication 40 MILLIGRAM(S): at 18:18

## 2017-08-20 NOTE — CHART NOTE - NSCHARTNOTEFT_GEN_A_CORE
7.9    10.21 )-----------( 208      ( 20 Aug 2017 18:00 )             24.8     T(C): 36.7 (08-20-17 @ 18:16), Max: 37.2 (08-19-17 @ 23:15)  HR: 64 (08-20-17 @ 18:16) (60 - 77)  BP: 101/68 (08-20-17 @ 18:16) (90/52 - 107/55)  RR: 16 (08-20-17 @ 18:16) (16 - 18)  SpO2: 98% (08-20-17 @ 18:16) (92% - 100%)    Patient repeat hemoglobin is 7.9 down from 8.5.  Spoke with attending (Dr. Cristobal) who recommended 1 unit PRBC tonight followed by 20mg Lasix.  Patient is currently hemodynamically stable and without complaints.  Will continue to monitor and assess hemoglobin post transfusion.    Sherry VINCENT  [id number]

## 2017-08-20 NOTE — CHART NOTE - NSCHARTNOTEFT_GEN_A_CORE
Pt s/p 2 units transfusion yesterday, H/H this morning 8.5/25.5. Pt denied any melena, BRBPR, hematuria. VSS with BP around systolic 90s/diastolic 50s. Pt appear comfortable. lung CTA bilaterally. Per cards pt to resume on lasix. case d/w attending monitor H/H q12 hr. PT consult    Of note pt also noted with 7 beats NSVT asymptomatic. Pt's BB on hold 2/2 soft BP. electrolyte stable no need for repletion. may consider restart BB once BP improves

## 2017-08-21 DIAGNOSIS — E83.42 HYPOMAGNESEMIA: ICD-10-CM

## 2017-08-21 LAB
BUN SERPL-MCNC: 40 MG/DL — HIGH (ref 7–23)
CALCIUM SERPL-MCNC: 9 MG/DL — SIGNIFICANT CHANGE UP (ref 8.4–10.5)
CHLORIDE SERPL-SCNC: 101 MMOL/L — SIGNIFICANT CHANGE UP (ref 98–107)
CO2 SERPL-SCNC: 24 MMOL/L — SIGNIFICANT CHANGE UP (ref 22–31)
CREAT SERPL-MCNC: 1.2 MG/DL — SIGNIFICANT CHANGE UP (ref 0.5–1.3)
GLUCOSE SERPL-MCNC: 155 MG/DL — HIGH (ref 70–99)
HCT VFR BLD CALC: 30.6 % — LOW (ref 34.5–45)
HGB BLD-MCNC: 10 G/DL — LOW (ref 11.5–15.5)
INR BLD: 3.42 — HIGH (ref 0.88–1.17)
MAGNESIUM SERPL-MCNC: 1.3 MG/DL — LOW (ref 1.6–2.6)
MCHC RBC-ENTMCNC: 24.2 PG — LOW (ref 27–34)
MCHC RBC-ENTMCNC: 32.7 % — SIGNIFICANT CHANGE UP (ref 32–36)
MCV RBC AUTO: 73.9 FL — LOW (ref 80–100)
NRBC # FLD: 0.02 — SIGNIFICANT CHANGE UP
PLATELET # BLD AUTO: 234 K/UL — SIGNIFICANT CHANGE UP (ref 150–400)
PMV BLD: 9.8 FL — SIGNIFICANT CHANGE UP (ref 7–13)
POTASSIUM SERPL-MCNC: 3.8 MMOL/L — SIGNIFICANT CHANGE UP (ref 3.5–5.3)
POTASSIUM SERPL-SCNC: 3.8 MMOL/L — SIGNIFICANT CHANGE UP (ref 3.5–5.3)
PROTHROM AB SERPL-ACNC: 39.3 SEC — HIGH (ref 9.8–13.1)
RBC # BLD: 4.14 M/UL — SIGNIFICANT CHANGE UP (ref 3.8–5.2)
RBC # FLD: 20.1 % — HIGH (ref 10.3–14.5)
SODIUM SERPL-SCNC: 139 MMOL/L — SIGNIFICANT CHANGE UP (ref 135–145)
WBC # BLD: 10.84 K/UL — HIGH (ref 3.8–10.5)
WBC # FLD AUTO: 10.84 K/UL — HIGH (ref 3.8–10.5)

## 2017-08-21 PROCEDURE — 71010: CPT | Mod: 26

## 2017-08-21 RX ORDER — SODIUM CHLORIDE 0.65 %
1 AEROSOL, SPRAY (ML) NASAL THREE TIMES A DAY
Qty: 0 | Refills: 0 | Status: DISCONTINUED | OUTPATIENT
Start: 2017-08-21 | End: 2017-08-29

## 2017-08-21 RX ORDER — MAGNESIUM SULFATE 500 MG/ML
1 VIAL (ML) INJECTION ONCE
Qty: 0 | Refills: 0 | Status: COMPLETED | OUTPATIENT
Start: 2017-08-21 | End: 2017-08-21

## 2017-08-21 RX ADMIN — PANTOPRAZOLE SODIUM 40 MILLIGRAM(S): 20 TABLET, DELAYED RELEASE ORAL at 05:20

## 2017-08-21 RX ADMIN — Medication 325 MILLIGRAM(S): at 12:31

## 2017-08-21 RX ADMIN — ONDANSETRON 8 MILLIGRAM(S): 8 TABLET, FILM COATED ORAL at 17:16

## 2017-08-21 RX ADMIN — Medication 40 MILLIGRAM(S): at 21:16

## 2017-08-21 RX ADMIN — Medication 2: at 12:31

## 2017-08-21 RX ADMIN — Medication 100 GRAM(S): at 12:30

## 2017-08-21 RX ADMIN — Medication 2: at 09:34

## 2017-08-21 RX ADMIN — PANTOPRAZOLE SODIUM 40 MILLIGRAM(S): 20 TABLET, DELAYED RELEASE ORAL at 17:16

## 2017-08-21 RX ADMIN — Medication 2 MILLIGRAM(S): at 05:20

## 2017-08-21 RX ADMIN — Medication 20 MILLIGRAM(S): at 00:43

## 2017-08-21 RX ADMIN — ONDANSETRON 8 MILLIGRAM(S): 8 TABLET, FILM COATED ORAL at 05:20

## 2017-08-21 RX ADMIN — ATORVASTATIN CALCIUM 40 MILLIGRAM(S): 80 TABLET, FILM COATED ORAL at 21:16

## 2017-08-21 NOTE — PHYSICAL THERAPY INITIAL EVALUATION ADULT - DIAGNOSIS, PT EVAL
Pt admitted for abdominal pain; pt presents with decreased strength, decreased balance, and difficulty with ambulation

## 2017-08-21 NOTE — PHYSICAL THERAPY INITIAL EVALUATION ADULT - PERTINENT HX OF CURRENT PROBLEM, REHAB EVAL
75 y/o female, with a PmHx of  Afib on coumadin, DM, CHF, is being admitted to telemetry for hypotension, krysten and abdominal pain.

## 2017-08-21 NOTE — PHYSICAL THERAPY INITIAL EVALUATION ADULT - ADDITIONAL COMMENTS
Pt reports that she lives in a private house with , daughter, and son with  ~4-5 ROGELIO; and a flight of stairs to negotiate to bedroom. Prior to hospital admission pt was completely independent and used a single axis cane with ambulation. Pt does have a rolling walker @ home as well; however doesn't use it. Pt's most recent fall was ~1 year ago where she fell down a flight of stair but has had no falls since.    Pt left comfortable in bed, NAD, all lines intact, all precautions maintained, with call bell in reach, and RN aware of PT evaluation.

## 2017-08-21 NOTE — PHYSICAL THERAPY INITIAL EVALUATION ADULT - ACTIVE RANGE OF MOTION EXAMINATION, REHAB EVAL
Right Shoulder flexion~80 degrees, Left shoulder flexion ~30 degrees, bilateral Knee flexion ~30 degrees

## 2017-08-21 NOTE — PHYSICAL THERAPY INITIAL EVALUATION ADULT - PATIENT PROFILE REVIEW, REHAB EVAL
ACTIVITY: Ambulate as Tolerate; spoke with RN Fidel Eduardo prior to PT evaluation--> Pt OK for increase as tolerate/yes

## 2017-08-21 NOTE — CHART NOTE - NSCHARTNOTEFT_GEN_A_CORE
Subjective:  Pt c/o cough with phlegm . Denies sob, wheezing, cp. Denies fever, chills. Also reports runny nose and nasal congestion.     VITAL SIGNS:  Vital Signs Last 24 Hrs  T(C): 36.3 (21 Aug 2017 15:03), Max: 36.8 (20 Aug 2017 20:13)  T(F): 97.4 (21 Aug 2017 15:03), Max: 98.2 (20 Aug 2017 20:13)  HR: 112 (21 Aug 2017 17:08) (64 - 114)  BP: 107/71 (21 Aug 2017 17:08) (88/60 - 144/94)  BP(mean): --  RR: 16 (21 Aug 2017 15:03) (16 - 18)  SpO2: 96% (21 Aug 2017 15:03) (95% - 100%)    O:  Well developed, well nourished   HEENT: Normocephalic / atraumatic, PERRLA, EOMI,     Chest / Lungs: coarse breath sounds bibasilar.   Heart: S1, S2, regular rate and rhythm, no murmurs, crackles or rales noted  Abdomen: Soft, obese, No rigidity, no rebound tenderness,     ALLERGIES:  Allergies    clavulanate (Unknown)  erythromycin (Hives; Rash)    Intolerances        MEDICATIONS  (STANDING):  atorvastatin 40 milliGRAM(s) Oral at bedtime  ferrous    sulfate 325 milliGRAM(s) Oral daily  insulin lispro (HumaLOG) corrective regimen sliding scale   SubCutaneous three times a day before meals  insulin lispro (HumaLOG) corrective regimen sliding scale   SubCutaneous at bedtime  dextrose 5%. 1000 milliLiter(s) (50 mL/Hr) IV Continuous <Continuous>  dextrose 50% Injectable 12.5 Gram(s) IV Push once  dextrose 50% Injectable 25 Gram(s) IV Push once  dextrose 50% Injectable 25 Gram(s) IV Push once  loperamide 2 milliGRAM(s) Oral two times a day  ondansetron    Tablet 8 milliGRAM(s) Oral two times a day  furosemide    Tablet 40 milliGRAM(s) Oral two times a day  pantoprazole    Tablet 40 milliGRAM(s) Oral two times a day before meals    MEDICATIONS  (PRN):  simethicone 80 milliGRAM(s) Chew every 6 hours PRN Gas  acetaminophen   Tablet. 650 milliGRAM(s) Oral every 6 hours PRN mild, moderate pain  dextrose Gel 1 Dose(s) Oral once PRN Blood Glucose LESS THAN 70 milliGRAM(s)/deciliter  glucagon  Injectable 1 milliGRAM(s) IntraMuscular once PRN Glucose LESS THAN 70 milligrams/deciliter  sodium chloride 0.65% Nasal 1 Spray(s) Both Nostrils three times a day PRN Nasal Congestion                            10.0   10.84 )-----------( 234      ( 21 Aug 2017 05:04 )             30.6     08-21    139  |  101  |  40<H>  ----------------------------<  155<H>  3.8   |  24  |  1.20    Ca    9.0      21 Aug 2017 05:04  Phos  1.9     08-20  Mg     1.3     08-21    TPro  5.3<L>  /  Alb  2.8<L>  /  TBili  1.1  /  DBili  x   /  AST  16  /  ALT  11  /  AlkPhos  34<L>  08-20          CAPILLARY BLOOD GLUCOSE  120 (21 Aug 2017 17:15)  187 (21 Aug 2017 11:46)  177 (21 Aug 2017 08:50)  108 (20 Aug 2017 22:32)  169 (20 Aug 2017 18:16)              Assessment:  76yFemale  with hx of CHF, Afib on coumadin, DM, presented with abdominal pain,n/v . Now c/o cough  Saline nasal spray prn  check CXR

## 2017-08-21 NOTE — PHYSICAL THERAPY INITIAL EVALUATION ADULT - CRITERIA FOR SKILLED THERAPEUTIC INTERVENTIONS
risk reduction/prevention/impairments found/functional limitations in following categories/rehab potential

## 2017-08-22 LAB
BUN SERPL-MCNC: 29 MG/DL — HIGH (ref 7–23)
CALCIUM SERPL-MCNC: 8.9 MG/DL — SIGNIFICANT CHANGE UP (ref 8.4–10.5)
CHLORIDE SERPL-SCNC: 96 MMOL/L — LOW (ref 98–107)
CO2 SERPL-SCNC: 23 MMOL/L — SIGNIFICANT CHANGE UP (ref 22–31)
CREAT SERPL-MCNC: 1.2 MG/DL — SIGNIFICANT CHANGE UP (ref 0.5–1.3)
GLUCOSE SERPL-MCNC: 153 MG/DL — HIGH (ref 70–99)
HCT VFR BLD CALC: 30.3 % — LOW (ref 34.5–45)
HGB BLD-MCNC: 10.2 G/DL — LOW (ref 11.5–15.5)
INR BLD: 1.81 — HIGH (ref 0.88–1.17)
MCHC RBC-ENTMCNC: 25 PG — LOW (ref 27–34)
MCHC RBC-ENTMCNC: 33.7 % — SIGNIFICANT CHANGE UP (ref 32–36)
MCV RBC AUTO: 74.3 FL — LOW (ref 80–100)
NRBC # FLD: 0.04 — SIGNIFICANT CHANGE UP
PHOSPHATE SERPL-MCNC: 2.9 MG/DL — SIGNIFICANT CHANGE UP (ref 2.5–4.5)
PLATELET # BLD AUTO: 257 K/UL — SIGNIFICANT CHANGE UP (ref 150–400)
PMV BLD: 10.3 FL — SIGNIFICANT CHANGE UP (ref 7–13)
POTASSIUM SERPL-MCNC: 3.7 MMOL/L — SIGNIFICANT CHANGE UP (ref 3.5–5.3)
POTASSIUM SERPL-SCNC: 3.7 MMOL/L — SIGNIFICANT CHANGE UP (ref 3.5–5.3)
PROTHROM AB SERPL-ACNC: 20.5 SEC — HIGH (ref 9.8–13.1)
RBC # BLD: 4.08 M/UL — SIGNIFICANT CHANGE UP (ref 3.8–5.2)
RBC # FLD: 21.2 % — HIGH (ref 10.3–14.5)
SODIUM SERPL-SCNC: 138 MMOL/L — SIGNIFICANT CHANGE UP (ref 135–145)
WBC # BLD: 12.54 K/UL — HIGH (ref 3.8–10.5)
WBC # FLD AUTO: 12.54 K/UL — HIGH (ref 3.8–10.5)

## 2017-08-22 RX ORDER — WARFARIN SODIUM 2.5 MG/1
1 TABLET ORAL ONCE
Qty: 0 | Refills: 0 | Status: COMPLETED | OUTPATIENT
Start: 2017-08-22 | End: 2017-08-22

## 2017-08-22 RX ORDER — HEPARIN SODIUM 5000 [USP'U]/ML
800 INJECTION INTRAVENOUS; SUBCUTANEOUS
Qty: 25000 | Refills: 0 | Status: DISCONTINUED | OUTPATIENT
Start: 2017-08-22 | End: 2017-08-26

## 2017-08-22 RX ORDER — METOPROLOL TARTRATE 50 MG
25 TABLET ORAL
Qty: 0 | Refills: 0 | Status: DISCONTINUED | OUTPATIENT
Start: 2017-08-22 | End: 2017-08-29

## 2017-08-22 RX ORDER — SODIUM CHLORIDE 9 MG/ML
500 INJECTION INTRAMUSCULAR; INTRAVENOUS; SUBCUTANEOUS ONCE
Qty: 0 | Refills: 0 | Status: COMPLETED | OUTPATIENT
Start: 2017-08-22 | End: 2017-08-22

## 2017-08-22 RX ADMIN — SODIUM CHLORIDE 1000 MILLILITER(S): 9 INJECTION INTRAMUSCULAR; INTRAVENOUS; SUBCUTANEOUS at 23:53

## 2017-08-22 RX ADMIN — HEPARIN SODIUM 8 UNIT(S)/HR: 5000 INJECTION INTRAVENOUS; SUBCUTANEOUS at 18:06

## 2017-08-22 RX ADMIN — Medication 25 MILLIGRAM(S): at 09:05

## 2017-08-22 RX ADMIN — Medication 325 MILLIGRAM(S): at 11:11

## 2017-08-22 RX ADMIN — PANTOPRAZOLE SODIUM 40 MILLIGRAM(S): 20 TABLET, DELAYED RELEASE ORAL at 05:02

## 2017-08-22 RX ADMIN — PANTOPRAZOLE SODIUM 40 MILLIGRAM(S): 20 TABLET, DELAYED RELEASE ORAL at 17:14

## 2017-08-22 RX ADMIN — Medication 2 MILLIGRAM(S): at 17:14

## 2017-08-22 RX ADMIN — Medication 2: at 09:05

## 2017-08-22 RX ADMIN — ATORVASTATIN CALCIUM 40 MILLIGRAM(S): 80 TABLET, FILM COATED ORAL at 21:01

## 2017-08-22 RX ADMIN — WARFARIN SODIUM 1 MILLIGRAM(S): 2.5 TABLET ORAL at 18:06

## 2017-08-22 RX ADMIN — ONDANSETRON 8 MILLIGRAM(S): 8 TABLET, FILM COATED ORAL at 17:14

## 2017-08-22 RX ADMIN — Medication 2: at 12:59

## 2017-08-22 RX ADMIN — Medication 25 MILLIGRAM(S): at 17:14

## 2017-08-22 RX ADMIN — Medication 40 MILLIGRAM(S): at 17:14

## 2017-08-22 RX ADMIN — Medication 650 MILLIGRAM(S): at 09:05

## 2017-08-22 RX ADMIN — Medication 650 MILLIGRAM(S): at 10:05

## 2017-08-23 LAB
APPEARANCE UR: CLEAR — SIGNIFICANT CHANGE UP
APTT BLD: 147.8 SEC — CRITICAL HIGH (ref 27.5–37.4)
APTT BLD: 34.2 SEC — SIGNIFICANT CHANGE UP (ref 27.5–37.4)
APTT BLD: 72.3 SEC — HIGH (ref 27.5–37.4)
BACTERIA # UR AUTO: SIGNIFICANT CHANGE UP
BILIRUB UR-MCNC: NEGATIVE — SIGNIFICANT CHANGE UP
BLOOD UR QL VISUAL: NEGATIVE — SIGNIFICANT CHANGE UP
BUN SERPL-MCNC: 29 MG/DL — HIGH (ref 7–23)
CALCIUM SERPL-MCNC: 8.7 MG/DL — SIGNIFICANT CHANGE UP (ref 8.4–10.5)
CHLORIDE SERPL-SCNC: 96 MMOL/L — LOW (ref 98–107)
CO2 SERPL-SCNC: 29 MMOL/L — SIGNIFICANT CHANGE UP (ref 22–31)
COLOR SPEC: YELLOW — SIGNIFICANT CHANGE UP
CREAT SERPL-MCNC: 1.38 MG/DL — HIGH (ref 0.5–1.3)
GLUCOSE SERPL-MCNC: 152 MG/DL — HIGH (ref 70–99)
GLUCOSE UR-MCNC: NEGATIVE — SIGNIFICANT CHANGE UP
GRAN CASTS # UR COMP ASSIST: SIGNIFICANT CHANGE UP
HCT VFR BLD CALC: 27.6 % — LOW (ref 34.5–45)
HCT VFR BLD CALC: 28.6 % — LOW (ref 34.5–45)
HGB BLD-MCNC: 9 G/DL — LOW (ref 11.5–15.5)
HGB BLD-MCNC: 9.4 G/DL — LOW (ref 11.5–15.5)
HYALINE CASTS # UR AUTO: SIGNIFICANT CHANGE UP (ref 0–?)
INR BLD: 1.7 — HIGH (ref 0.88–1.17)
KETONES UR-MCNC: NEGATIVE — SIGNIFICANT CHANGE UP
LEUKOCYTE ESTERASE UR-ACNC: NEGATIVE — SIGNIFICANT CHANGE UP
MAGNESIUM SERPL-MCNC: 1.5 MG/DL — LOW (ref 1.6–2.6)
MCHC RBC-ENTMCNC: 24.3 PG — LOW (ref 27–34)
MCHC RBC-ENTMCNC: 24.4 PG — LOW (ref 27–34)
MCHC RBC-ENTMCNC: 32.6 % — SIGNIFICANT CHANGE UP (ref 32–36)
MCHC RBC-ENTMCNC: 32.9 % — SIGNIFICANT CHANGE UP (ref 32–36)
MCV RBC AUTO: 74.3 FL — LOW (ref 80–100)
MCV RBC AUTO: 74.4 FL — LOW (ref 80–100)
MUCOUS THREADS # UR AUTO: SIGNIFICANT CHANGE UP
NITRITE UR-MCNC: NEGATIVE — SIGNIFICANT CHANGE UP
NRBC # FLD: 0.02 — SIGNIFICANT CHANGE UP
NRBC # FLD: 0.03 — SIGNIFICANT CHANGE UP
PH UR: 6 — SIGNIFICANT CHANGE UP (ref 4.6–8)
PLATELET # BLD AUTO: 227 K/UL — SIGNIFICANT CHANGE UP (ref 150–400)
PLATELET # BLD AUTO: 240 K/UL — SIGNIFICANT CHANGE UP (ref 150–400)
PMV BLD: 10.2 FL — SIGNIFICANT CHANGE UP (ref 7–13)
PMV BLD: 10.2 FL — SIGNIFICANT CHANGE UP (ref 7–13)
POTASSIUM SERPL-MCNC: 3.7 MMOL/L — SIGNIFICANT CHANGE UP (ref 3.5–5.3)
POTASSIUM SERPL-SCNC: 3.7 MMOL/L — SIGNIFICANT CHANGE UP (ref 3.5–5.3)
PROT UR-MCNC: NEGATIVE — SIGNIFICANT CHANGE UP
PROTHROM AB SERPL-ACNC: 19.2 SEC — HIGH (ref 9.8–13.1)
RBC # BLD: 3.71 M/UL — LOW (ref 3.8–5.2)
RBC # BLD: 3.85 M/UL — SIGNIFICANT CHANGE UP (ref 3.8–5.2)
RBC # FLD: 21.4 % — HIGH (ref 10.3–14.5)
RBC # FLD: 22 % — HIGH (ref 10.3–14.5)
RBC CASTS # UR COMP ASSIST: SIGNIFICANT CHANGE UP (ref 0–?)
SODIUM SERPL-SCNC: 136 MMOL/L — SIGNIFICANT CHANGE UP (ref 135–145)
SP GR SPEC: 1.01 — SIGNIFICANT CHANGE UP (ref 1–1.03)
SQUAMOUS # UR AUTO: SIGNIFICANT CHANGE UP
UROBILINOGEN FLD QL: NORMAL E.U. — SIGNIFICANT CHANGE UP (ref 0.1–0.2)
WBC # BLD: 11.73 K/UL — HIGH (ref 3.8–10.5)
WBC # BLD: 11.74 K/UL — HIGH (ref 3.8–10.5)
WBC # FLD AUTO: 11.73 K/UL — HIGH (ref 3.8–10.5)
WBC # FLD AUTO: 11.74 K/UL — HIGH (ref 3.8–10.5)
WBC UR QL: SIGNIFICANT CHANGE UP (ref 0–?)

## 2017-08-23 RX ORDER — WARFARIN SODIUM 2.5 MG/1
4 TABLET ORAL ONCE
Qty: 0 | Refills: 0 | Status: COMPLETED | OUTPATIENT
Start: 2017-08-23 | End: 2017-08-23

## 2017-08-23 RX ORDER — POTASSIUM CHLORIDE 20 MEQ
20 PACKET (EA) ORAL ONCE
Qty: 0 | Refills: 0 | Status: COMPLETED | OUTPATIENT
Start: 2017-08-23 | End: 2017-08-23

## 2017-08-23 RX ORDER — SENNA PLUS 8.6 MG/1
2 TABLET ORAL AT BEDTIME
Qty: 0 | Refills: 0 | Status: DISCONTINUED | OUTPATIENT
Start: 2017-08-23 | End: 2017-08-29

## 2017-08-23 RX ORDER — DOCUSATE SODIUM 100 MG
100 CAPSULE ORAL
Qty: 0 | Refills: 0 | Status: DISCONTINUED | OUTPATIENT
Start: 2017-08-23 | End: 2017-08-29

## 2017-08-23 RX ORDER — POLYETHYLENE GLYCOL 3350 17 G/17G
17 POWDER, FOR SOLUTION ORAL DAILY
Qty: 0 | Refills: 0 | Status: DISCONTINUED | OUTPATIENT
Start: 2017-08-23 | End: 2017-08-29

## 2017-08-23 RX ORDER — MAGNESIUM SULFATE 500 MG/ML
2 VIAL (ML) INJECTION ONCE
Qty: 0 | Refills: 0 | Status: COMPLETED | OUTPATIENT
Start: 2017-08-23 | End: 2017-08-23

## 2017-08-23 RX ADMIN — PANTOPRAZOLE SODIUM 40 MILLIGRAM(S): 20 TABLET, DELAYED RELEASE ORAL at 17:34

## 2017-08-23 RX ADMIN — HEPARIN SODIUM 12 UNIT(S)/HR: 5000 INJECTION INTRAVENOUS; SUBCUTANEOUS at 20:10

## 2017-08-23 RX ADMIN — HEPARIN SODIUM 9 UNIT(S)/HR: 5000 INJECTION INTRAVENOUS; SUBCUTANEOUS at 21:59

## 2017-08-23 RX ADMIN — Medication 2 MILLIGRAM(S): at 05:29

## 2017-08-23 RX ADMIN — Medication 40 MILLIGRAM(S): at 18:26

## 2017-08-23 RX ADMIN — Medication 25 MILLIGRAM(S): at 18:26

## 2017-08-23 RX ADMIN — HEPARIN SODIUM 8 UNIT(S)/HR: 5000 INJECTION INTRAVENOUS; SUBCUTANEOUS at 02:59

## 2017-08-23 RX ADMIN — Medication 325 MILLIGRAM(S): at 13:10

## 2017-08-23 RX ADMIN — WARFARIN SODIUM 4 MILLIGRAM(S): 2.5 TABLET ORAL at 18:26

## 2017-08-23 RX ADMIN — Medication 650 MILLIGRAM(S): at 13:40

## 2017-08-23 RX ADMIN — Medication 100 MILLIGRAM(S): at 18:26

## 2017-08-23 RX ADMIN — Medication 50 GRAM(S): at 10:34

## 2017-08-23 RX ADMIN — HEPARIN SODIUM 12 UNIT(S)/HR: 5000 INJECTION INTRAVENOUS; SUBCUTANEOUS at 11:29

## 2017-08-23 RX ADMIN — Medication 650 MILLIGRAM(S): at 13:10

## 2017-08-23 RX ADMIN — HEPARIN SODIUM 12 UNIT(S)/HR: 5000 INJECTION INTRAVENOUS; SUBCUTANEOUS at 03:46

## 2017-08-23 RX ADMIN — SENNA PLUS 2 TABLET(S): 8.6 TABLET ORAL at 21:58

## 2017-08-23 RX ADMIN — ATORVASTATIN CALCIUM 40 MILLIGRAM(S): 80 TABLET, FILM COATED ORAL at 21:58

## 2017-08-23 RX ADMIN — Medication 20 MILLIEQUIVALENT(S): at 10:32

## 2017-08-23 RX ADMIN — ONDANSETRON 8 MILLIGRAM(S): 8 TABLET, FILM COATED ORAL at 05:29

## 2017-08-23 RX ADMIN — Medication 2: at 13:10

## 2017-08-23 RX ADMIN — PANTOPRAZOLE SODIUM 40 MILLIGRAM(S): 20 TABLET, DELAYED RELEASE ORAL at 05:30

## 2017-08-23 NOTE — PROVIDER CONTACT NOTE (OTHER) - ACTION/TREATMENT ORDERED:
Administer blood transfusion as ordered and con't to reassess VS as per Blood transfusion protocol.
will continue to monitor
Re-assess BP in an hour.
awaiting dig level

## 2017-08-23 NOTE — PROVIDER CONTACT NOTE (CRITICAL VALUE NOTIFICATION) - ASSESSMENT
Awake, alert and oriented x 2-3. Complaining of feeling "weak".
Patient asymptomatic. No signs or symptoms of bleeding.
pt c/o feeling 'weak' . awake and alert and oriented x 2-3.

## 2017-08-23 NOTE — CHART NOTE - NSCHARTNOTEFT_GEN_A_CORE
Notified by STEFFI Mejía at 2100 BP 88/60. Patient asymptomatic resting comfortably in bed. denies HA, dizziness, hematuria, hematochezia, hematemesis.      Appearance: Normal, NAD, NRD.  HEENT:   Normal oral mucosa, PERRL, EOMI	  Cardiovascular: Normal S1 S2, No JVD, No murmurs, No edema  Respiratory: Lungs clear to auscultation, no rales/rhonchi/wheezing	  Psychiatry: A & O x 3, Mood & affect appropriate  Gastrointestinal:  Soft, Non-tender, ND + BS	  Skin: No rashes, No ecchymoses, No cyanosis  Neurologic: Non-focal, sensation intact, CN grossly intact  Extremities: Normal range of motion, No clubbing, cyanosis or edema  Vascular: Peripheral pulses palpable    Patient given 500 cc bolus then BP improved to 90/60                        9.4    11.73 )-----------( 227      ( 23 Aug 2017 00:50 )             28.6    Will continue to monitor CBC, and vitals q 4hrs.

## 2017-08-23 NOTE — PROVIDER CONTACT NOTE (CRITICAL VALUE NOTIFICATION) - BACKGROUND
75 y/o female, hx of CHF, Afib, on coumadin, DM, presented to ER with abdominal pain, nausea and vomiting.  Pt admitted for hypotension, SHELBY, Supertheraputeutic INR,  Diarrhea and abd pain
77 y/o female, hx of CHF, Afib, on coumadin, DM, presented to ER with abdominal pain, nausea and vomiting.  Pt admitted for hypotension, SHELBY, Supertheraputeutic INR,  Diarrhea and abd pain
Patient has a-fib. On a heparin drip.

## 2017-08-23 NOTE — PROVIDER CONTACT NOTE (CRITICAL VALUE NOTIFICATION) - ACTION/TREATMENT ORDERED:
Type and screen sent to lab
no tx indicated at this time.
Heparin drip stopped for one hour. Restart at 9ml/hr after one hour pause.

## 2017-08-24 LAB
APTT BLD: 60.1 SEC — HIGH (ref 27.5–37.4)
APTT BLD: 73.3 SEC — HIGH (ref 27.5–37.4)
BACTERIA UR CULT: SIGNIFICANT CHANGE UP
BUN SERPL-MCNC: 27 MG/DL — HIGH (ref 7–23)
CALCIUM SERPL-MCNC: 8.7 MG/DL — SIGNIFICANT CHANGE UP (ref 8.4–10.5)
CHLORIDE SERPL-SCNC: 96 MMOL/L — LOW (ref 98–107)
CO2 SERPL-SCNC: 26 MMOL/L — SIGNIFICANT CHANGE UP (ref 22–31)
CREAT SERPL-MCNC: 1.31 MG/DL — HIGH (ref 0.5–1.3)
GLUCOSE SERPL-MCNC: 141 MG/DL — HIGH (ref 70–99)
HCT VFR BLD CALC: 30.6 % — LOW (ref 34.5–45)
HGB BLD-MCNC: 9.8 G/DL — LOW (ref 11.5–15.5)
INR BLD: 1.44 — HIGH (ref 0.88–1.17)
MCHC RBC-ENTMCNC: 24.1 PG — LOW (ref 27–34)
MCHC RBC-ENTMCNC: 32 % — SIGNIFICANT CHANGE UP (ref 32–36)
MCV RBC AUTO: 75.4 FL — LOW (ref 80–100)
NRBC # FLD: 0 — SIGNIFICANT CHANGE UP
PLATELET # BLD AUTO: 256 K/UL — SIGNIFICANT CHANGE UP (ref 150–400)
PMV BLD: 10.3 FL — SIGNIFICANT CHANGE UP (ref 7–13)
POTASSIUM SERPL-MCNC: 3.7 MMOL/L — SIGNIFICANT CHANGE UP (ref 3.5–5.3)
POTASSIUM SERPL-SCNC: 3.7 MMOL/L — SIGNIFICANT CHANGE UP (ref 3.5–5.3)
PROTHROM AB SERPL-ACNC: 16.3 SEC — HIGH (ref 9.8–13.1)
RBC # BLD: 4.06 M/UL — SIGNIFICANT CHANGE UP (ref 3.8–5.2)
RBC # FLD: 21.7 % — HIGH (ref 10.3–14.5)
SODIUM SERPL-SCNC: 135 MMOL/L — SIGNIFICANT CHANGE UP (ref 135–145)
SPECIMEN SOURCE: SIGNIFICANT CHANGE UP
WBC # BLD: 10.82 K/UL — HIGH (ref 3.8–10.5)
WBC # FLD AUTO: 10.82 K/UL — HIGH (ref 3.8–10.5)

## 2017-08-24 RX ORDER — WARFARIN SODIUM 2.5 MG/1
4 TABLET ORAL ONCE
Qty: 0 | Refills: 0 | Status: COMPLETED | OUTPATIENT
Start: 2017-08-24 | End: 2017-08-24

## 2017-08-24 RX ORDER — FUROSEMIDE 40 MG
40 TABLET ORAL DAILY
Qty: 0 | Refills: 0 | Status: DISCONTINUED | OUTPATIENT
Start: 2017-08-25 | End: 2017-08-29

## 2017-08-24 RX ADMIN — Medication 40 MILLIGRAM(S): at 05:17

## 2017-08-24 RX ADMIN — PANTOPRAZOLE SODIUM 40 MILLIGRAM(S): 20 TABLET, DELAYED RELEASE ORAL at 05:17

## 2017-08-24 RX ADMIN — ATORVASTATIN CALCIUM 40 MILLIGRAM(S): 80 TABLET, FILM COATED ORAL at 21:33

## 2017-08-24 RX ADMIN — Medication 650 MILLIGRAM(S): at 09:29

## 2017-08-24 RX ADMIN — WARFARIN SODIUM 4 MILLIGRAM(S): 2.5 TABLET ORAL at 17:47

## 2017-08-24 RX ADMIN — Medication 2: at 09:26

## 2017-08-24 RX ADMIN — HEPARIN SODIUM 9 UNIT(S)/HR: 5000 INJECTION INTRAVENOUS; SUBCUTANEOUS at 13:09

## 2017-08-24 RX ADMIN — SENNA PLUS 2 TABLET(S): 8.6 TABLET ORAL at 21:33

## 2017-08-24 RX ADMIN — Medication 100 MILLIGRAM(S): at 17:43

## 2017-08-24 RX ADMIN — ONDANSETRON 8 MILLIGRAM(S): 8 TABLET, FILM COATED ORAL at 17:44

## 2017-08-24 RX ADMIN — Medication 100 MILLIGRAM(S): at 05:17

## 2017-08-24 RX ADMIN — Medication 650 MILLIGRAM(S): at 10:25

## 2017-08-24 RX ADMIN — Medication 25 MILLIGRAM(S): at 17:44

## 2017-08-24 RX ADMIN — Medication 25 MILLIGRAM(S): at 05:16

## 2017-08-24 RX ADMIN — Medication 325 MILLIGRAM(S): at 13:08

## 2017-08-24 RX ADMIN — ONDANSETRON 8 MILLIGRAM(S): 8 TABLET, FILM COATED ORAL at 05:17

## 2017-08-24 RX ADMIN — PANTOPRAZOLE SODIUM 40 MILLIGRAM(S): 20 TABLET, DELAYED RELEASE ORAL at 17:43

## 2017-08-24 RX ADMIN — Medication 4: at 13:07

## 2017-08-25 LAB
APTT BLD: 58.8 SEC — HIGH (ref 27.5–37.4)
BASOPHILS # BLD AUTO: 0.04 K/UL — SIGNIFICANT CHANGE UP (ref 0–0.2)
BASOPHILS NFR BLD AUTO: 0.4 % — SIGNIFICANT CHANGE UP (ref 0–2)
BUN SERPL-MCNC: 27 MG/DL — HIGH (ref 7–23)
CALCIUM SERPL-MCNC: 9 MG/DL — SIGNIFICANT CHANGE UP (ref 8.4–10.5)
CHLORIDE SERPL-SCNC: 95 MMOL/L — LOW (ref 98–107)
CO2 SERPL-SCNC: 25 MMOL/L — SIGNIFICANT CHANGE UP (ref 22–31)
CREAT SERPL-MCNC: 1.26 MG/DL — SIGNIFICANT CHANGE UP (ref 0.5–1.3)
EOSINOPHIL # BLD AUTO: 0.11 K/UL — SIGNIFICANT CHANGE UP (ref 0–0.5)
EOSINOPHIL NFR BLD AUTO: 1 % — SIGNIFICANT CHANGE UP (ref 0–6)
GLUCOSE SERPL-MCNC: 155 MG/DL — HIGH (ref 70–99)
HCT VFR BLD CALC: 27.5 % — LOW (ref 34.5–45)
HGB BLD-MCNC: 8.9 G/DL — LOW (ref 11.5–15.5)
IMM GRANULOCYTES # BLD AUTO: 0.07 # — SIGNIFICANT CHANGE UP
IMM GRANULOCYTES NFR BLD AUTO: 0.6 % — SIGNIFICANT CHANGE UP (ref 0–1.5)
INR BLD: 1.67 — HIGH (ref 0.88–1.17)
LYMPHOCYTES # BLD AUTO: 18.9 % — SIGNIFICANT CHANGE UP (ref 13–44)
LYMPHOCYTES # BLD AUTO: 2.06 K/UL — SIGNIFICANT CHANGE UP (ref 1–3.3)
MAGNESIUM SERPL-MCNC: 1.6 MG/DL — SIGNIFICANT CHANGE UP (ref 1.6–2.6)
MCHC RBC-ENTMCNC: 24.3 PG — LOW (ref 27–34)
MCHC RBC-ENTMCNC: 32.4 % — SIGNIFICANT CHANGE UP (ref 32–36)
MCV RBC AUTO: 74.9 FL — LOW (ref 80–100)
MONOCYTES # BLD AUTO: 1.07 K/UL — HIGH (ref 0–0.9)
MONOCYTES NFR BLD AUTO: 9.8 % — SIGNIFICANT CHANGE UP (ref 2–14)
NEUTROPHILS # BLD AUTO: 7.57 K/UL — HIGH (ref 1.8–7.4)
NEUTROPHILS NFR BLD AUTO: 69.3 % — SIGNIFICANT CHANGE UP (ref 43–77)
NRBC # FLD: 0 — SIGNIFICANT CHANGE UP
PLATELET # BLD AUTO: 274 K/UL — SIGNIFICANT CHANGE UP (ref 150–400)
PMV BLD: 10 FL — SIGNIFICANT CHANGE UP (ref 7–13)
POTASSIUM SERPL-MCNC: 3.7 MMOL/L — SIGNIFICANT CHANGE UP (ref 3.5–5.3)
POTASSIUM SERPL-SCNC: 3.7 MMOL/L — SIGNIFICANT CHANGE UP (ref 3.5–5.3)
PROTHROM AB SERPL-ACNC: 18.9 SEC — HIGH (ref 9.8–13.1)
RBC # BLD: 3.67 M/UL — LOW (ref 3.8–5.2)
RBC # FLD: 21.6 % — HIGH (ref 10.3–14.5)
SODIUM SERPL-SCNC: 134 MMOL/L — LOW (ref 135–145)
URATE SERPL-MCNC: 7.4 MG/DL — HIGH (ref 2.5–7)
WBC # BLD: 10.92 K/UL — HIGH (ref 3.8–10.5)
WBC # FLD AUTO: 10.92 K/UL — HIGH (ref 3.8–10.5)

## 2017-08-25 RX ORDER — MAGNESIUM SULFATE 500 MG/ML
1 VIAL (ML) INJECTION ONCE
Qty: 0 | Refills: 0 | Status: COMPLETED | OUTPATIENT
Start: 2017-08-25 | End: 2017-08-25

## 2017-08-25 RX ORDER — OXYCODONE AND ACETAMINOPHEN 5; 325 MG/1; MG/1
1 TABLET ORAL ONCE
Qty: 0 | Refills: 0 | Status: DISCONTINUED | OUTPATIENT
Start: 2017-08-25 | End: 2017-08-25

## 2017-08-25 RX ORDER — POTASSIUM CHLORIDE 20 MEQ
40 PACKET (EA) ORAL ONCE
Qty: 0 | Refills: 0 | Status: COMPLETED | OUTPATIENT
Start: 2017-08-25 | End: 2017-08-25

## 2017-08-25 RX ORDER — WARFARIN SODIUM 2.5 MG/1
5 TABLET ORAL ONCE
Qty: 0 | Refills: 0 | Status: COMPLETED | OUTPATIENT
Start: 2017-08-25 | End: 2017-08-25

## 2017-08-25 RX ADMIN — POLYETHYLENE GLYCOL 3350 17 GRAM(S): 17 POWDER, FOR SOLUTION ORAL at 12:59

## 2017-08-25 RX ADMIN — Medication 20 MILLIGRAM(S): at 17:53

## 2017-08-25 RX ADMIN — Medication 650 MILLIGRAM(S): at 12:58

## 2017-08-25 RX ADMIN — Medication 2: at 17:53

## 2017-08-25 RX ADMIN — PANTOPRAZOLE SODIUM 40 MILLIGRAM(S): 20 TABLET, DELAYED RELEASE ORAL at 05:18

## 2017-08-25 RX ADMIN — Medication 100 GRAM(S): at 10:24

## 2017-08-25 RX ADMIN — OXYCODONE AND ACETAMINOPHEN 1 TABLET(S): 5; 325 TABLET ORAL at 17:51

## 2017-08-25 RX ADMIN — Medication 650 MILLIGRAM(S): at 13:58

## 2017-08-25 RX ADMIN — ONDANSETRON 8 MILLIGRAM(S): 8 TABLET, FILM COATED ORAL at 17:53

## 2017-08-25 RX ADMIN — Medication 100 MILLIGRAM(S): at 17:53

## 2017-08-25 RX ADMIN — Medication 100 MILLIGRAM(S): at 05:17

## 2017-08-25 RX ADMIN — HEPARIN SODIUM 9 UNIT(S)/HR: 5000 INJECTION INTRAVENOUS; SUBCUTANEOUS at 16:36

## 2017-08-25 RX ADMIN — SENNA PLUS 2 TABLET(S): 8.6 TABLET ORAL at 21:33

## 2017-08-25 RX ADMIN — Medication 325 MILLIGRAM(S): at 12:59

## 2017-08-25 RX ADMIN — WARFARIN SODIUM 5 MILLIGRAM(S): 2.5 TABLET ORAL at 17:53

## 2017-08-25 RX ADMIN — Medication 2: at 12:59

## 2017-08-25 RX ADMIN — ONDANSETRON 8 MILLIGRAM(S): 8 TABLET, FILM COATED ORAL at 05:17

## 2017-08-25 RX ADMIN — OXYCODONE AND ACETAMINOPHEN 1 TABLET(S): 5; 325 TABLET ORAL at 16:53

## 2017-08-25 RX ADMIN — PANTOPRAZOLE SODIUM 40 MILLIGRAM(S): 20 TABLET, DELAYED RELEASE ORAL at 17:53

## 2017-08-25 RX ADMIN — ATORVASTATIN CALCIUM 40 MILLIGRAM(S): 80 TABLET, FILM COATED ORAL at 21:33

## 2017-08-25 RX ADMIN — Medication 40 MILLIEQUIVALENT(S): at 10:23

## 2017-08-25 NOTE — PROGRESS NOTE ADULT - PROBLEM SELECTOR PLAN 7
improved. monitor mg level
supplemented today, monitor
1g mg iv given. monitor mg level

## 2017-08-25 NOTE — CHART NOTE - NSCHARTNOTEFT_GEN_A_CORE
Pt c/o severe BL foot pain when walking with PT. ( HR went up to 190s at this time). Pt states pain feels exactly like her gout she had a flare many years ago. Pt denies trauma.     + swelling noted to dorsum midfoot RLE + TTP R > L  + ichythiosis BL feet    Will check Uric acid , start Prednisone 20 mg x 3 days, pain control.    d/w Dr. Cristobal

## 2017-08-26 LAB
BUN SERPL-MCNC: 25 MG/DL — HIGH (ref 7–23)
CALCIUM SERPL-MCNC: 9.3 MG/DL — SIGNIFICANT CHANGE UP (ref 8.4–10.5)
CHLORIDE SERPL-SCNC: 99 MMOL/L — SIGNIFICANT CHANGE UP (ref 98–107)
CO2 SERPL-SCNC: 24 MMOL/L — SIGNIFICANT CHANGE UP (ref 22–31)
CREAT SERPL-MCNC: 1.09 MG/DL — SIGNIFICANT CHANGE UP (ref 0.5–1.3)
GLUCOSE SERPL-MCNC: 170 MG/DL — HIGH (ref 70–99)
HCT VFR BLD CALC: 26.9 % — LOW (ref 34.5–45)
HGB BLD-MCNC: 8.7 G/DL — LOW (ref 11.5–15.5)
INR BLD: 2.11 — HIGH (ref 0.88–1.17)
MCHC RBC-ENTMCNC: 24.2 PG — LOW (ref 27–34)
MCHC RBC-ENTMCNC: 32.3 % — SIGNIFICANT CHANGE UP (ref 32–36)
MCV RBC AUTO: 74.7 FL — LOW (ref 80–100)
NRBC # FLD: 0.02 — SIGNIFICANT CHANGE UP
PLATELET # BLD AUTO: 287 K/UL — SIGNIFICANT CHANGE UP (ref 150–400)
PMV BLD: 9.9 FL — SIGNIFICANT CHANGE UP (ref 7–13)
POTASSIUM SERPL-MCNC: 4.8 MMOL/L — SIGNIFICANT CHANGE UP (ref 3.5–5.3)
POTASSIUM SERPL-SCNC: 4.8 MMOL/L — SIGNIFICANT CHANGE UP (ref 3.5–5.3)
PROTHROM AB SERPL-ACNC: 24 SEC — HIGH (ref 9.8–13.1)
RBC # BLD: 3.6 M/UL — LOW (ref 3.8–5.2)
RBC # FLD: 21.8 % — HIGH (ref 10.3–14.5)
SODIUM SERPL-SCNC: 136 MMOL/L — SIGNIFICANT CHANGE UP (ref 135–145)
WBC # BLD: 7.38 K/UL — SIGNIFICANT CHANGE UP (ref 3.8–10.5)
WBC # FLD AUTO: 7.38 K/UL — SIGNIFICANT CHANGE UP (ref 3.8–10.5)

## 2017-08-26 RX ORDER — WARFARIN SODIUM 2.5 MG/1
4 TABLET ORAL ONCE
Qty: 0 | Refills: 0 | Status: COMPLETED | OUTPATIENT
Start: 2017-08-26 | End: 2017-08-26

## 2017-08-26 RX ADMIN — Medication 40 MILLIGRAM(S): at 05:17

## 2017-08-26 RX ADMIN — Medication 20 MILLIGRAM(S): at 05:17

## 2017-08-26 RX ADMIN — PANTOPRAZOLE SODIUM 40 MILLIGRAM(S): 20 TABLET, DELAYED RELEASE ORAL at 05:17

## 2017-08-26 RX ADMIN — ATORVASTATIN CALCIUM 40 MILLIGRAM(S): 80 TABLET, FILM COATED ORAL at 23:09

## 2017-08-26 RX ADMIN — PANTOPRAZOLE SODIUM 40 MILLIGRAM(S): 20 TABLET, DELAYED RELEASE ORAL at 17:16

## 2017-08-26 RX ADMIN — Medication 325 MILLIGRAM(S): at 13:19

## 2017-08-26 RX ADMIN — WARFARIN SODIUM 4 MILLIGRAM(S): 2.5 TABLET ORAL at 17:16

## 2017-08-26 RX ADMIN — Medication 8: at 13:20

## 2017-08-26 RX ADMIN — Medication 2: at 09:34

## 2017-08-26 RX ADMIN — Medication 2: at 17:18

## 2017-08-26 RX ADMIN — Medication 100 MILLIGRAM(S): at 05:17

## 2017-08-26 RX ADMIN — Medication 25 MILLIGRAM(S): at 17:16

## 2017-08-26 RX ADMIN — Medication 100 MILLIGRAM(S): at 17:17

## 2017-08-26 RX ADMIN — Medication 25 MILLIGRAM(S): at 05:17

## 2017-08-27 LAB
BASOPHILS # BLD AUTO: 0.02 K/UL — SIGNIFICANT CHANGE UP (ref 0–0.2)
BASOPHILS NFR BLD AUTO: 0.3 % — SIGNIFICANT CHANGE UP (ref 0–2)
BUN SERPL-MCNC: 28 MG/DL — HIGH (ref 7–23)
CALCIUM SERPL-MCNC: 9.5 MG/DL — SIGNIFICANT CHANGE UP (ref 8.4–10.5)
CHLORIDE SERPL-SCNC: 98 MMOL/L — SIGNIFICANT CHANGE UP (ref 98–107)
CO2 SERPL-SCNC: 26 MMOL/L — SIGNIFICANT CHANGE UP (ref 22–31)
CREAT SERPL-MCNC: 1.16 MG/DL — SIGNIFICANT CHANGE UP (ref 0.5–1.3)
EOSINOPHIL # BLD AUTO: 0.09 K/UL — SIGNIFICANT CHANGE UP (ref 0–0.5)
EOSINOPHIL NFR BLD AUTO: 1.2 % — SIGNIFICANT CHANGE UP (ref 0–6)
GLUCOSE SERPL-MCNC: 113 MG/DL — HIGH (ref 70–99)
HCT VFR BLD CALC: 28.4 % — LOW (ref 34.5–45)
HGB BLD-MCNC: 9.1 G/DL — LOW (ref 11.5–15.5)
IMM GRANULOCYTES # BLD AUTO: 0.02 # — SIGNIFICANT CHANGE UP
IMM GRANULOCYTES NFR BLD AUTO: 0.3 % — SIGNIFICANT CHANGE UP (ref 0–1.5)
INR BLD: 2.55 — HIGH (ref 0.88–1.17)
LYMPHOCYTES # BLD AUTO: 2.42 K/UL — SIGNIFICANT CHANGE UP (ref 1–3.3)
LYMPHOCYTES # BLD AUTO: 32.1 % — SIGNIFICANT CHANGE UP (ref 13–44)
MAGNESIUM SERPL-MCNC: 1.7 MG/DL — SIGNIFICANT CHANGE UP (ref 1.6–2.6)
MCHC RBC-ENTMCNC: 23.9 PG — LOW (ref 27–34)
MCHC RBC-ENTMCNC: 32 % — SIGNIFICANT CHANGE UP (ref 32–36)
MCV RBC AUTO: 74.7 FL — LOW (ref 80–100)
MONOCYTES # BLD AUTO: 0.61 K/UL — SIGNIFICANT CHANGE UP (ref 0–0.9)
MONOCYTES NFR BLD AUTO: 8.1 % — SIGNIFICANT CHANGE UP (ref 2–14)
NEUTROPHILS # BLD AUTO: 4.38 K/UL — SIGNIFICANT CHANGE UP (ref 1.8–7.4)
NEUTROPHILS NFR BLD AUTO: 58 % — SIGNIFICANT CHANGE UP (ref 43–77)
NRBC # FLD: 0 — SIGNIFICANT CHANGE UP
PLATELET # BLD AUTO: 333 K/UL — SIGNIFICANT CHANGE UP (ref 150–400)
PMV BLD: 9.6 FL — SIGNIFICANT CHANGE UP (ref 7–13)
POTASSIUM SERPL-MCNC: 4.3 MMOL/L — SIGNIFICANT CHANGE UP (ref 3.5–5.3)
POTASSIUM SERPL-SCNC: 4.3 MMOL/L — SIGNIFICANT CHANGE UP (ref 3.5–5.3)
PROTHROM AB SERPL-ACNC: 29.1 SEC — HIGH (ref 9.8–13.1)
RBC # BLD: 3.8 M/UL — SIGNIFICANT CHANGE UP (ref 3.8–5.2)
RBC # FLD: 21.7 % — HIGH (ref 10.3–14.5)
SODIUM SERPL-SCNC: 138 MMOL/L — SIGNIFICANT CHANGE UP (ref 135–145)
WBC # BLD: 7.54 K/UL — SIGNIFICANT CHANGE UP (ref 3.8–10.5)
WBC # FLD AUTO: 7.54 K/UL — SIGNIFICANT CHANGE UP (ref 3.8–10.5)

## 2017-08-27 RX ORDER — WARFARIN SODIUM 2.5 MG/1
3 TABLET ORAL ONCE
Qty: 0 | Refills: 0 | Status: COMPLETED | OUTPATIENT
Start: 2017-08-27 | End: 2017-08-27

## 2017-08-27 RX ADMIN — Medication 25 MILLIGRAM(S): at 17:42

## 2017-08-27 RX ADMIN — PANTOPRAZOLE SODIUM 40 MILLIGRAM(S): 20 TABLET, DELAYED RELEASE ORAL at 05:47

## 2017-08-27 RX ADMIN — Medication 25 MILLIGRAM(S): at 05:47

## 2017-08-27 RX ADMIN — Medication 100 MILLIGRAM(S): at 17:42

## 2017-08-27 RX ADMIN — Medication 40 MILLIGRAM(S): at 05:47

## 2017-08-27 RX ADMIN — Medication 8: at 12:25

## 2017-08-27 RX ADMIN — Medication 2: at 09:19

## 2017-08-27 RX ADMIN — WARFARIN SODIUM 3 MILLIGRAM(S): 2.5 TABLET ORAL at 17:42

## 2017-08-27 RX ADMIN — POLYETHYLENE GLYCOL 3350 17 GRAM(S): 17 POWDER, FOR SOLUTION ORAL at 12:29

## 2017-08-27 RX ADMIN — ONDANSETRON 8 MILLIGRAM(S): 8 TABLET, FILM COATED ORAL at 17:42

## 2017-08-27 RX ADMIN — Medication 325 MILLIGRAM(S): at 09:22

## 2017-08-27 RX ADMIN — ATORVASTATIN CALCIUM 40 MILLIGRAM(S): 80 TABLET, FILM COATED ORAL at 22:03

## 2017-08-27 RX ADMIN — Medication 20 MILLIGRAM(S): at 05:47

## 2017-08-27 RX ADMIN — PANTOPRAZOLE SODIUM 40 MILLIGRAM(S): 20 TABLET, DELAYED RELEASE ORAL at 17:42

## 2017-08-28 LAB
BACTERIA BLD CULT: SIGNIFICANT CHANGE UP
BACTERIA BLD CULT: SIGNIFICANT CHANGE UP
BASOPHILS # BLD AUTO: 0.03 K/UL — SIGNIFICANT CHANGE UP (ref 0–0.2)
BASOPHILS NFR BLD AUTO: 0.3 % — SIGNIFICANT CHANGE UP (ref 0–2)
BUN SERPL-MCNC: 33 MG/DL — HIGH (ref 7–23)
CALCIUM SERPL-MCNC: 9.8 MG/DL — SIGNIFICANT CHANGE UP (ref 8.4–10.5)
CHLORIDE SERPL-SCNC: 99 MMOL/L — SIGNIFICANT CHANGE UP (ref 98–107)
CO2 SERPL-SCNC: 25 MMOL/L — SIGNIFICANT CHANGE UP (ref 22–31)
CREAT SERPL-MCNC: 1.34 MG/DL — HIGH (ref 0.5–1.3)
EOSINOPHIL # BLD AUTO: 0.11 K/UL — SIGNIFICANT CHANGE UP (ref 0–0.5)
EOSINOPHIL NFR BLD AUTO: 1.3 % — SIGNIFICANT CHANGE UP (ref 0–6)
GLUCOSE SERPL-MCNC: 111 MG/DL — HIGH (ref 70–99)
HCT VFR BLD CALC: 31.4 % — LOW (ref 34.5–45)
HGB BLD-MCNC: 9.7 G/DL — LOW (ref 11.5–15.5)
IMM GRANULOCYTES # BLD AUTO: 0.02 # — SIGNIFICANT CHANGE UP
IMM GRANULOCYTES NFR BLD AUTO: 0.2 % — SIGNIFICANT CHANGE UP (ref 0–1.5)
INR BLD: 2.78 — HIGH (ref 0.88–1.17)
LYMPHOCYTES # BLD AUTO: 3.03 K/UL — SIGNIFICANT CHANGE UP (ref 1–3.3)
LYMPHOCYTES # BLD AUTO: 34.9 % — SIGNIFICANT CHANGE UP (ref 13–44)
MAGNESIUM SERPL-MCNC: 1.7 MG/DL — SIGNIFICANT CHANGE UP (ref 1.6–2.6)
MCHC RBC-ENTMCNC: 23.5 PG — LOW (ref 27–34)
MCHC RBC-ENTMCNC: 30.9 % — LOW (ref 32–36)
MCV RBC AUTO: 76.2 FL — LOW (ref 80–100)
MONOCYTES # BLD AUTO: 0.77 K/UL — SIGNIFICANT CHANGE UP (ref 0–0.9)
MONOCYTES NFR BLD AUTO: 8.9 % — SIGNIFICANT CHANGE UP (ref 2–14)
NEUTROPHILS # BLD AUTO: 4.71 K/UL — SIGNIFICANT CHANGE UP (ref 1.8–7.4)
NEUTROPHILS NFR BLD AUTO: 54.4 % — SIGNIFICANT CHANGE UP (ref 43–77)
NRBC # FLD: 0 — SIGNIFICANT CHANGE UP
PLATELET # BLD AUTO: 369 K/UL — SIGNIFICANT CHANGE UP (ref 150–400)
PMV BLD: 9.7 FL — SIGNIFICANT CHANGE UP (ref 7–13)
POTASSIUM SERPL-MCNC: 4.5 MMOL/L — SIGNIFICANT CHANGE UP (ref 3.5–5.3)
POTASSIUM SERPL-SCNC: 4.5 MMOL/L — SIGNIFICANT CHANGE UP (ref 3.5–5.3)
PROTHROM AB SERPL-ACNC: 31.8 SEC — HIGH (ref 9.8–13.1)
RBC # BLD: 4.12 M/UL — SIGNIFICANT CHANGE UP (ref 3.8–5.2)
RBC # FLD: 22.1 % — HIGH (ref 10.3–14.5)
SODIUM SERPL-SCNC: 139 MMOL/L — SIGNIFICANT CHANGE UP (ref 135–145)
WBC # BLD: 8.67 K/UL — SIGNIFICANT CHANGE UP (ref 3.8–10.5)
WBC # FLD AUTO: 8.67 K/UL — SIGNIFICANT CHANGE UP (ref 3.8–10.5)

## 2017-08-28 RX ORDER — WARFARIN SODIUM 2.5 MG/1
1 TABLET ORAL ONCE
Qty: 0 | Refills: 0 | Status: COMPLETED | OUTPATIENT
Start: 2017-08-28 | End: 2017-08-28

## 2017-08-28 RX ADMIN — SENNA PLUS 2 TABLET(S): 8.6 TABLET ORAL at 21:23

## 2017-08-28 RX ADMIN — PANTOPRAZOLE SODIUM 40 MILLIGRAM(S): 20 TABLET, DELAYED RELEASE ORAL at 05:51

## 2017-08-28 RX ADMIN — ATORVASTATIN CALCIUM 40 MILLIGRAM(S): 80 TABLET, FILM COATED ORAL at 21:22

## 2017-08-28 RX ADMIN — Medication 25 MILLIGRAM(S): at 05:51

## 2017-08-28 RX ADMIN — Medication 40 MILLIGRAM(S): at 05:51

## 2017-08-28 RX ADMIN — Medication 325 MILLIGRAM(S): at 12:14

## 2017-08-28 RX ADMIN — Medication 2: at 09:29

## 2017-08-28 RX ADMIN — WARFARIN SODIUM 1 MILLIGRAM(S): 2.5 TABLET ORAL at 18:07

## 2017-08-28 RX ADMIN — PANTOPRAZOLE SODIUM 40 MILLIGRAM(S): 20 TABLET, DELAYED RELEASE ORAL at 16:12

## 2017-08-28 RX ADMIN — Medication 20 MILLIGRAM(S): at 05:51

## 2017-08-28 RX ADMIN — Medication 25 MILLIGRAM(S): at 18:07

## 2017-08-28 RX ADMIN — Medication 100 MILLIGRAM(S): at 18:07

## 2017-08-28 RX ADMIN — Medication 6: at 12:36

## 2017-08-28 NOTE — PROGRESS NOTE ADULT - PROBLEM SELECTOR PROBLEM 4
CHF (congestive heart failure)
Hypotension
CHF (congestive heart failure)
Hypotension

## 2017-08-28 NOTE — PROGRESS NOTE ADULT - PROBLEM SELECTOR PROBLEM 2
SHELBY (acute kidney injury)
Atrial fibrillation
SHELBY (acute kidney injury)
Atrial fibrillation

## 2017-08-28 NOTE — PROGRESS NOTE ADULT - PROBLEM SELECTOR PLAN 6
Increase BASAGLAR insulin to 36 units at night    Increase HUMALOG insulin to 12 units before meals  Add 2 units if blood sugar is more than 200  Add 4 units if blood sugar is more than 300  Subtract 2 units if blood sugar is less than 100      Prescription Policy     Our goal is to serve you on a more timely basis. Currently, our office receives a large volume of phone requests for medication refills. We are requesting your cooperation with the following:    · Look over your medications, diabetic supplies, etc. before coming to your appointment to see if you need any refills.    · Request your medication (or supply) refills during your office visit.    · Outside of an office visit, requests should be directed to your pharmacy even if you have zero refills. Call your pharmacy after 72 hours to see if your prescription is ready for pick-up.     Pharmacy Refills: All prescriptions take 48-72 hours to refill.          Our Patients are Important    We want to improve and you can help. You may receive a survey asking you about this visit. Please complete this survey; we will use your feedback to make improvements. Thank you.    
resolved
vit k given acute drop in hb
Receiving Kphos 15 millimoles infusion. monitor phos level daily
improved. continue to monitor
vit k given acute drop in hb
Receiving Kphos 15 millimoles infusion

## 2017-08-28 NOTE — PROGRESS NOTE ADULT - PROBLEM SELECTOR PLAN 4
improved with iv fludis
improved with iv fluids
improved with iv fludis
severe LV dysfunction on JULIANO 8/17. Lasix as per cardiology
severe LV dysfunction on JULIANO 8/17. on lasix 40 daily
severe LV dysfunction on JULIANO 8/17. patient does not seems fluid overloaded currently with episodes of hypotension and tachycardia improved with fluid bolus, would  reduce it to 40 daily
severe LV dysfunction on JULIANO 8/17. patient does not seems fluid overloaded currently with episodes of hypotension and tachycardia improved with fluid bolus, would hold lasix for now or reduce it to 40 daily
severe LV dysfunction on JULIANO 8/17, was started on lasix 40 bid. clinically euvolemic. hold lasix.

## 2017-08-28 NOTE — DIETITIAN INITIAL EVALUATION ADULT. - OTHER INFO
Pt seen for length of stay. Pt 77 yo female appears alert, oriented. Per Pt her appetite not that good lately. Pt also stated she lost weight, but unable to quantify. Pt not sure about her current body weight either. Food preferences discussed with Pt. No chew/swallow problem voiced; no nausea/vomiting/diarrhea reported @ present.

## 2017-08-28 NOTE — PROGRESS NOTE ADULT - PROBLEM SELECTOR PROBLEM 3
Abdominal pain
SHELBY (acute kidney injury)
Abdominal pain
SHELBY (acute kidney injury)

## 2017-08-28 NOTE — PROGRESS NOTE ADULT - PROBLEM SELECTOR PLAN 5
diuresis as needed - monitor volume status closely
HOTN currently improved with fluids
Optimal  S/P transfusion, cardio on board. possibly will need pressor support.
Optimal, improved
Optimal, improved
diuresis as needed - monitor volume status closely
now hypotensive. getting transfusion, cardio on board. possibly will need pressor support.

## 2017-08-28 NOTE — PROGRESS NOTE ADULT - PROBLEM SELECTOR PLAN 2
rate controlled at present
rate controlled at present restarted ac , f/u inr
likely prerenal improving
likely prerenal improving
likely prerenal, improved
likely prerenal, improved
likely prerenal, improved, again worsen today. possible sec to prerenal sec to lasix vs ATN sec to hypotension. check urine urea, cr, eos, ua
likely prerenal, likely improved to baseline. continue to monitor
rate controlled at present
likely prerenal improving

## 2017-08-28 NOTE — PROGRESS NOTE ADULT - PROBLEM SELECTOR PLAN 3
likely prerenal, improving with hydration
with occult positive, acute drop of Hb. Hb improving  GI following
with occult positive, acute drop of Hb. s/p BT, Hb downtrending again. GI following
with occult positive, acute drop of Hb, GI following

## 2017-08-28 NOTE — PROGRESS NOTE ADULT - PROBLEM SELECTOR PROBLEM 6
Coagulopathy
Hypophosphatemia
Coagulopathy

## 2017-08-28 NOTE — PROGRESS NOTE ADULT - PROBLEM SELECTOR PLAN 1
transfuse to keep hb > 9   lasix in between transfusion  likely gi bleed, protonix, gi f/u
transfuse to keep hb > 9   lasix in between transfusion  likely gi bleed, protonix, gi f/u - no egd at present
transfuse to keep hb > 9 , hb/ hct been stable   ppi,  gi f/u - no egd at present
improved, likely dehydration. continue to monitor
stable. continue to monitor
transfuse to keep hb > 9   lasix in between transfusion  likely gi bleed, protonix, gi f/u
improved, likely dehydration. continue to monitor

## 2017-08-28 NOTE — PROGRESS NOTE ADULT - PROBLEM SELECTOR PROBLEM 5
Hypertension
CHF (congestive heart failure)
Hypertension
CHF (congestive heart failure)

## 2017-08-28 NOTE — DIETITIAN INITIAL EVALUATION ADULT. - DIET TYPE
low sodium/DASH/TLC (sodium and cholesterol restricted diet)/regular/consistent carbohydrate (no snacks)

## 2017-08-28 NOTE — DIETITIAN INITIAL EVALUATION ADULT. - NS AS NUTRI INTERV MEALS SNACK
1. Suggest: PO diet rx: Regular, Consistent Carbohydrate (no snacks), DASH/TLC (cholesterol and sodium restricted); PO supplement: Glucerna Shake 8oz. 2x daily (will provide additional ~440kcals, 20g protein);               2. Encourage & assist Pt with meals; Monitor PO diet tolerance;               3. Monitor labs, weights, hydration status;/Diets modified for specific foods and ingredients/Other (specify)

## 2017-08-29 ENCOUNTER — TRANSCRIPTION ENCOUNTER (OUTPATIENT)
Age: 77
End: 2017-08-29

## 2017-08-29 VITALS — SYSTOLIC BLOOD PRESSURE: 127 MMHG | OXYGEN SATURATION: 100 % | HEART RATE: 125 BPM | DIASTOLIC BLOOD PRESSURE: 79 MMHG

## 2017-08-29 LAB
BASOPHILS # BLD AUTO: 0.04 K/UL — SIGNIFICANT CHANGE UP (ref 0–0.2)
BASOPHILS NFR BLD AUTO: 0.5 % — SIGNIFICANT CHANGE UP (ref 0–2)
BUN SERPL-MCNC: 40 MG/DL — HIGH (ref 7–23)
CALCIUM SERPL-MCNC: 9.9 MG/DL — SIGNIFICANT CHANGE UP (ref 8.4–10.5)
CHLORIDE SERPL-SCNC: 97 MMOL/L — LOW (ref 98–107)
CO2 SERPL-SCNC: 24 MMOL/L — SIGNIFICANT CHANGE UP (ref 22–31)
CREAT SERPL-MCNC: 1.3 MG/DL — SIGNIFICANT CHANGE UP (ref 0.5–1.3)
EOSINOPHIL # BLD AUTO: 0.09 K/UL — SIGNIFICANT CHANGE UP (ref 0–0.5)
EOSINOPHIL NFR BLD AUTO: 1 % — SIGNIFICANT CHANGE UP (ref 0–6)
GLUCOSE SERPL-MCNC: 121 MG/DL — HIGH (ref 70–99)
HCT VFR BLD CALC: 31.5 % — LOW (ref 34.5–45)
HGB BLD-MCNC: 10 G/DL — LOW (ref 11.5–15.5)
IMM GRANULOCYTES # BLD AUTO: 0.04 # — SIGNIFICANT CHANGE UP
IMM GRANULOCYTES NFR BLD AUTO: 0.5 % — SIGNIFICANT CHANGE UP (ref 0–1.5)
INR BLD: 2.33 — HIGH (ref 0.88–1.17)
LYMPHOCYTES # BLD AUTO: 3.02 K/UL — SIGNIFICANT CHANGE UP (ref 1–3.3)
LYMPHOCYTES # BLD AUTO: 34 % — SIGNIFICANT CHANGE UP (ref 13–44)
MAGNESIUM SERPL-MCNC: 1.7 MG/DL — SIGNIFICANT CHANGE UP (ref 1.6–2.6)
MCHC RBC-ENTMCNC: 23.9 PG — LOW (ref 27–34)
MCHC RBC-ENTMCNC: 31.7 % — LOW (ref 32–36)
MCV RBC AUTO: 75.2 FL — LOW (ref 80–100)
MONOCYTES # BLD AUTO: 0.66 K/UL — SIGNIFICANT CHANGE UP (ref 0–0.9)
MONOCYTES NFR BLD AUTO: 7.4 % — SIGNIFICANT CHANGE UP (ref 2–14)
NEUTROPHILS # BLD AUTO: 5.02 K/UL — SIGNIFICANT CHANGE UP (ref 1.8–7.4)
NEUTROPHILS NFR BLD AUTO: 56.6 % — SIGNIFICANT CHANGE UP (ref 43–77)
NRBC # FLD: 0 — SIGNIFICANT CHANGE UP
PLATELET # BLD AUTO: 376 K/UL — SIGNIFICANT CHANGE UP (ref 150–400)
PMV BLD: 9.4 FL — SIGNIFICANT CHANGE UP (ref 7–13)
POTASSIUM SERPL-MCNC: 4 MMOL/L — SIGNIFICANT CHANGE UP (ref 3.5–5.3)
POTASSIUM SERPL-SCNC: 4 MMOL/L — SIGNIFICANT CHANGE UP (ref 3.5–5.3)
PROTHROM AB SERPL-ACNC: 26.6 SEC — HIGH (ref 9.8–13.1)
RBC # BLD: 4.19 M/UL — SIGNIFICANT CHANGE UP (ref 3.8–5.2)
RBC # FLD: 22.3 % — HIGH (ref 10.3–14.5)
SODIUM SERPL-SCNC: 138 MMOL/L — SIGNIFICANT CHANGE UP (ref 135–145)
WBC # BLD: 8.87 K/UL — SIGNIFICANT CHANGE UP (ref 3.8–10.5)
WBC # FLD AUTO: 8.87 K/UL — SIGNIFICANT CHANGE UP (ref 3.8–10.5)

## 2017-08-29 RX ORDER — ONDANSETRON 8 MG/1
1 TABLET, FILM COATED ORAL
Qty: 0 | Refills: 0 | COMMUNITY
Start: 2017-08-29

## 2017-08-29 RX ORDER — METOPROLOL TARTRATE 50 MG
1 TABLET ORAL
Qty: 0 | Refills: 0 | COMMUNITY
Start: 2017-08-29

## 2017-08-29 RX ORDER — PANTOPRAZOLE SODIUM 20 MG/1
1 TABLET, DELAYED RELEASE ORAL
Qty: 0 | Refills: 0 | COMMUNITY
Start: 2017-08-29

## 2017-08-29 RX ORDER — ATORVASTATIN CALCIUM 80 MG/1
1 TABLET, FILM COATED ORAL
Qty: 0 | Refills: 0 | COMMUNITY
Start: 2017-08-29

## 2017-08-29 RX ORDER — SODIUM CHLORIDE 0.65 %
1 AEROSOL, SPRAY (ML) NASAL
Qty: 0 | Refills: 0 | COMMUNITY
Start: 2017-08-29

## 2017-08-29 RX ORDER — FUROSEMIDE 40 MG
1 TABLET ORAL
Qty: 0 | Refills: 0 | COMMUNITY
Start: 2017-08-29

## 2017-08-29 RX ORDER — DOCUSATE SODIUM 100 MG
1 CAPSULE ORAL
Qty: 0 | Refills: 0 | COMMUNITY
Start: 2017-08-29

## 2017-08-29 RX ORDER — POLYETHYLENE GLYCOL 3350 17 G/17G
17 POWDER, FOR SOLUTION ORAL
Qty: 0 | Refills: 0 | COMMUNITY
Start: 2017-08-29

## 2017-08-29 RX ORDER — WARFARIN SODIUM 2.5 MG/1
3 TABLET ORAL ONCE
Qty: 0 | Refills: 0 | Status: COMPLETED | OUTPATIENT
Start: 2017-08-29 | End: 2017-08-29

## 2017-08-29 RX ORDER — SIMETHICONE 80 MG/1
1 TABLET, CHEWABLE ORAL
Qty: 0 | Refills: 0 | COMMUNITY
Start: 2017-08-29

## 2017-08-29 RX ORDER — ACETAMINOPHEN 500 MG
2 TABLET ORAL
Qty: 0 | Refills: 0 | COMMUNITY
Start: 2017-08-29

## 2017-08-29 RX ORDER — SENNA PLUS 8.6 MG/1
2 TABLET ORAL
Qty: 0 | Refills: 0 | COMMUNITY
Start: 2017-08-29

## 2017-08-29 RX ADMIN — POLYETHYLENE GLYCOL 3350 17 GRAM(S): 17 POWDER, FOR SOLUTION ORAL at 15:36

## 2017-08-29 RX ADMIN — Medication 25 MILLIGRAM(S): at 06:30

## 2017-08-29 RX ADMIN — PANTOPRAZOLE SODIUM 40 MILLIGRAM(S): 20 TABLET, DELAYED RELEASE ORAL at 17:26

## 2017-08-29 RX ADMIN — PANTOPRAZOLE SODIUM 40 MILLIGRAM(S): 20 TABLET, DELAYED RELEASE ORAL at 06:31

## 2017-08-29 RX ADMIN — Medication 100 MILLIGRAM(S): at 06:30

## 2017-08-29 RX ADMIN — Medication 325 MILLIGRAM(S): at 11:45

## 2017-08-29 RX ADMIN — Medication 100 MILLIGRAM(S): at 17:26

## 2017-08-29 RX ADMIN — WARFARIN SODIUM 3 MILLIGRAM(S): 2.5 TABLET ORAL at 17:26

## 2017-08-29 RX ADMIN — Medication 100 MILLIGRAM(S): at 11:44

## 2017-08-29 RX ADMIN — ONDANSETRON 8 MILLIGRAM(S): 8 TABLET, FILM COATED ORAL at 17:26

## 2017-08-29 RX ADMIN — Medication 2: at 13:37

## 2017-08-29 RX ADMIN — ONDANSETRON 8 MILLIGRAM(S): 8 TABLET, FILM COATED ORAL at 06:31

## 2017-08-29 RX ADMIN — Medication 2: at 08:47

## 2017-08-29 RX ADMIN — Medication 40 MILLIGRAM(S): at 06:30

## 2017-08-29 RX ADMIN — Medication 25 MILLIGRAM(S): at 17:26

## 2017-08-29 NOTE — PROGRESS NOTE ADULT - SUBJECTIVE AND OBJECTIVE BOX
LUCY [id number],   76yFemale followed for:  clavulanate (Unknown)  erythromycin (Hives; Rash)    PAST MEDICAL & SURGICAL HISTORY:  Diabetes mellitus  Atrial fibrillation: on coumadin  CHF (congestive heart failure)  Hypertension  No significant past surgical history    FAMILY HISTORY:  No pertinent family history in first degree relatives    MEDICATIONS  (STANDING):  atorvastatin 40 milliGRAM(s) Oral at bedtime  ferrous    sulfate 325 milliGRAM(s) Oral daily  insulin lispro (HumaLOG) corrective regimen sliding scale   SubCutaneous three times a day before meals  insulin lispro (HumaLOG) corrective regimen sliding scale   SubCutaneous at bedtime  dextrose 5%. 1000 milliLiter(s) (50 mL/Hr) IV Continuous <Continuous>  dextrose 50% Injectable 12.5 Gram(s) IV Push once  dextrose 50% Injectable 25 Gram(s) IV Push once  dextrose 50% Injectable 25 Gram(s) IV Push once  loperamide 2 milliGRAM(s) Oral two times a day  ondansetron    Tablet 8 milliGRAM(s) Oral two times a day  furosemide    Tablet 40 milliGRAM(s) Oral two times a day  pantoprazole    Tablet 40 milliGRAM(s) Oral two times a day before meals  metoprolol 25 milliGRAM(s) Oral two times a day  heparin  Infusion 800 Unit(s)/Hr (12 mL/Hr) IV Continuous <Continuous>    MEDICATIONS  (PRN):  simethicone 80 milliGRAM(s) Chew every 6 hours PRN Gas  acetaminophen   Tablet. 650 milliGRAM(s) Oral every 6 hours PRN mild, moderate pain  dextrose Gel 1 Dose(s) Oral once PRN Blood Glucose LESS THAN 70 milliGRAM(s)/deciliter  glucagon  Injectable 1 milliGRAM(s) IntraMuscular once PRN Glucose LESS THAN 70 milligrams/deciliter  sodium chloride 0.65% Nasal 1 Spray(s) Both Nostrils three times a day PRN Nasal Congestion      Vital Signs Last 24 Hrs  T(C): 37 (23 Aug 2017 05:25), Max: 37.1 (22 Aug 2017 17:09)  T(F): 98.6 (23 Aug 2017 05:25), Max: 98.7 (22 Aug 2017 17:09)  HR: 95 (23 Aug 2017 05:25) (60 - 105)  BP: 96/63 (23 Aug 2017 05:25) (60/- - 129/53)  BP(mean): --  RR: 18 (23 Aug 2017 05:25) (18 - 18)  SpO2: 100% (23 Aug 2017 05:25) (95% - 100%)  nc/at  s1s2  cta  soft, nt, nd no guarding or rebound  no c/c/e    CBC Full  -  ( 23 Aug 2017 07:35 )  WBC Count : 11.74 K/uL  Hemoglobin : 9.0 g/dL  Hematocrit : 27.6 %  Platelet Count - Automated : 240 K/uL  Mean Cell Volume : 74.4 fL  Mean Cell Hemoglobin : 24.3 pg  Mean Cell Hemoglobin Concentration : 32.6 %  Auto Neutrophil # : x  Auto Lymphocyte # : x  Auto Monocyte # : x  Auto Eosinophil # : x  Auto Basophil # : x  Auto Neutrophil % : x  Auto Lymphocyte % : x  Auto Monocyte % : x  Auto Eosinophil % : x  Auto Basophil % : x    08-23    136  |  96<L>  |  29<H>  ----------------------------<  152<H>  3.7   |  29  |  1.38<H>    Ca    8.7      23 Aug 2017 07:35  Phos  2.9     08-22  Mg     1.5     08-23      PT/INR - ( 22 Aug 2017 10:19 )   PT: 20.5 SEC;   INR: 1.81          PTT - ( 23 Aug 2017 00:50 )  PTT:34.2 SEC
Subjective: Patient seen and examined. No new events except as noted.     SUBJECTIVE/ROS:      feels better   MEDICATIONS:  MEDICATIONS  (STANDING):  atorvastatin 40 milliGRAM(s) Oral at bedtime  ferrous    sulfate 325 milliGRAM(s) Oral daily  insulin lispro (HumaLOG) corrective regimen sliding scale   SubCutaneous three times a day before meals  insulin lispro (HumaLOG) corrective regimen sliding scale   SubCutaneous at bedtime  dextrose 5%. 1000 milliLiter(s) (50 mL/Hr) IV Continuous <Continuous>  dextrose 50% Injectable 12.5 Gram(s) IV Push once  dextrose 50% Injectable 25 Gram(s) IV Push once  dextrose 50% Injectable 25 Gram(s) IV Push once  loperamide 2 milliGRAM(s) Oral two times a day  ondansetron    Tablet 8 milliGRAM(s) Oral two times a day  furosemide    Tablet 40 milliGRAM(s) Oral two times a day  pantoprazole    Tablet 40 milliGRAM(s) Oral two times a day before meals      PHYSICAL EXAM:  T(C): 36.6 (08-21-17 @ 04:35), Max: 37.1 (08-20-17 @ 14:29)  HR: 99 (08-21-17 @ 04:35) (63 - 99)  BP: 98/55 (08-21-17 @ 04:35) (94/61 - 112/62)  RR: 17 (08-21-17 @ 04:35) (16 - 18)  SpO2: 96% (08-21-17 @ 04:35) (92% - 100%)  Wt(kg): --  I&O's Summary    20 Aug 2017 07:01  -  21 Aug 2017 07:00  --------------------------------------------------------  IN: 850 mL / OUT: 0 mL / NET: 850 mL          Appearance: Normal	  HEENT:   Normal oral mucosa, PERRL, EOMI	  Cardiovascular: Normal S1 S2,    Murmur:   Neck: JVP normal  Respiratory: Lungs clear to auscultation  Gastrointestinal:  Soft, Non-tender, + BS	  Skin: normal   Neuro: No gross deficits.   Psychiatry:  Mood & affect appropriate  Ext: No edema        LABS:    CARDIAC MARKERS:  CARDIAC MARKERS ( 18 Aug 2017 17:45 )  x     / < 0.06 ng/mL / 37 u/L / 2.03 ng/mL / x                                    10.0   10.84 )-----------( 234      ( 21 Aug 2017 05:04 )             30.6     08-21    139  |  101  |  40<H>  ----------------------------<  155<H>  3.8   |  24  |  1.20    Ca    9.0      21 Aug 2017 05:04  Phos  1.9     08-20  Mg     1.3     08-21    TPro  5.3<L>  /  Alb  2.8<L>  /  TBili  1.1  /  DBili  x   /  AST  16  /  ALT  11  /  AlkPhos  34<L>  08-20    proBNP:   Lipid Profile:   HgA1c:   TSH:           TELEMETRY: 	    ECG:  	  RADIOLOGY:   DIAGNOSTIC TESTING:  Echocardiogram:  Catheterization:  Stress Test:    OTHER:
Subjective: Patient seen and examined. No new events except as noted.     SUBJECTIVE/ROS:  feels better       MEDICATIONS:  MEDICATIONS  (STANDING):  atorvastatin 40 milliGRAM(s) Oral at bedtime  ferrous    sulfate 325 milliGRAM(s) Oral daily  insulin lispro (HumaLOG) corrective regimen sliding scale   SubCutaneous three times a day before meals  insulin lispro (HumaLOG) corrective regimen sliding scale   SubCutaneous at bedtime  dextrose 5%. 1000 milliLiter(s) (50 mL/Hr) IV Continuous <Continuous>  dextrose 50% Injectable 12.5 Gram(s) IV Push once  dextrose 50% Injectable 25 Gram(s) IV Push once  dextrose 50% Injectable 25 Gram(s) IV Push once  loperamide 2 milliGRAM(s) Oral two times a day  ondansetron    Tablet 8 milliGRAM(s) Oral two times a day  potassium phosphate IVPB 15 milliMole(s) IV Intermittent once      PHYSICAL EXAM:  T(C): 37 (08-20-17 @ 03:47), Max: 37.2 (08-19-17 @ 23:15)  HR: 74 (08-20-17 @ 03:47) (60 - 129)  BP: 97/50 (08-20-17 @ 03:47) (81/47 - 107/55)  RR: 16 (08-20-17 @ 03:47) (16 - 18)  SpO2: 100% (08-20-17 @ 03:47) (96% - 100%)  Wt(kg): --  I&O's Summary    19 Aug 2017 07:01  -  20 Aug 2017 07:00  --------------------------------------------------------  IN: 1460 mL / OUT: 0 mL / NET: 1460 mL          Appearance: Normal	  HEENT:   Normal oral mucosa, PERRL, EOMI	  Cardiovascular: Normal S1 S2,    Murmur:   Neck: JVP normal  Respiratory: Lungs clear to auscultation  Gastrointestinal:  Soft, Non-tender, + BS	  Skin: normal   Neuro: No gross deficits.   Psychiatry:  Mood & affect appropriate  Ext: No edema        LABS:    CARDIAC MARKERS:  CARDIAC MARKERS ( 18 Aug 2017 17:45 )  x     / < 0.06 ng/mL / 37 u/L / 2.03 ng/mL / x      CARDIAC MARKERS ( 17 Aug 2017 20:40 )  x     / < 0.06 ng/mL / x     / x     / x                                    8.5    11.07 )-----------( 222      ( 20 Aug 2017 05:35 )             25.5     08-20    142  |  108<H>  |  70<H>  ----------------------------<  184<H>  4.4   |  20<L>  |  1.33<H>    Ca    8.8      20 Aug 2017 05:35  Phos  1.9     08-20  Mg     1.6     08-20    TPro  5.3<L>  /  Alb  2.8<L>  /  TBili  1.1  /  DBili  x   /  AST  16  /  ALT  11  /  AlkPhos  34<L>  08-20    proBNP:   Lipid Profile:   HgA1c:   TSH:           TELEMETRY: 	    ECG:  	  RADIOLOGY:   DIAGNOSTIC TESTING:  Echocardiogram:  Catheterization:  Stress Test:    OTHER:
Subjective: Patient seen and examined. No new events except as noted.     SUBJECTIVE/ROS:  feels well  No chest pain, or sob.        MEDICATIONS:  MEDICATIONS  (STANDING):  atorvastatin 40 milliGRAM(s) Oral at bedtime  ferrous    sulfate 325 milliGRAM(s) Oral daily  insulin lispro (HumaLOG) corrective regimen sliding scale   SubCutaneous three times a day before meals  insulin lispro (HumaLOG) corrective regimen sliding scale   SubCutaneous at bedtime  dextrose 5%. 1000 milliLiter(s) (50 mL/Hr) IV Continuous <Continuous>  dextrose 50% Injectable 12.5 Gram(s) IV Push once  dextrose 50% Injectable 25 Gram(s) IV Push once  dextrose 50% Injectable 25 Gram(s) IV Push once  loperamide 2 milliGRAM(s) Oral two times a day  ondansetron    Tablet 8 milliGRAM(s) Oral two times a day  furosemide    Tablet 40 milliGRAM(s) Oral two times a day  pantoprazole    Tablet 40 milliGRAM(s) Oral two times a day before meals  metoprolol 25 milliGRAM(s) Oral two times a day  heparin  Infusion 800 Unit(s)/Hr (8 mL/Hr) IV Continuous <Continuous>      PHYSICAL EXAM:  T(C): 37.1 (08-22-17 @ 17:09), Max: 37.1 (08-22-17 @ 17:09)  HR: 98 (08-22-17 @ 17:09) (60 - 140)  BP: 124/52 (08-22-17 @ 17:09) (99/61 - 129/53)  RR: 18 (08-22-17 @ 17:09) (17 - 18)  SpO2: 98% (08-22-17 @ 17:09) (95% - 99%)  Wt(kg): --  I&O's Summary    21 Aug 2017 07:01  -  22 Aug 2017 07:00  --------------------------------------------------------  IN: 340 mL / OUT: 0 mL / NET: 340 mL    22 Aug 2017 07:01  -  22 Aug 2017 19:00  --------------------------------------------------------  IN: 240 mL / OUT: 0 mL / NET: 240 mL          Appearance: Normal	  HEENT:   Normal oral mucosa, PERRL, EOMI	  Cardiovascular: Normal S1 S2,    Murmur:   Neck: JVP normal  Respiratory: Lungs clear to auscultation  Gastrointestinal:  Soft, Non-tender, + BS	  Skin: normal   Neuro: No gross deficits.   Psychiatry:  Mood & affect appropriate  Ext: No edema        LABS:    CARDIAC MARKERS:                                10.2   12.54 )-----------( 257      ( 22 Aug 2017 05:00 )             30.3     08-22    138  |  96<L>  |  29<H>  ----------------------------<  153<H>  3.7   |  23  |  1.20    Ca    8.9      22 Aug 2017 05:00  Phos  2.9     08-22  Mg     1.3     08-21      proBNP:   Lipid Profile:   HgA1c:   TSH:           TELEMETRY: 	    ECG:  	  RADIOLOGY:   DIAGNOSTIC TESTING:  Echocardiogram:  Catheterization:  Stress Test:    OTHER:
Dr. Laczano (Nephrology)  Office [phone]  Cell [phone]  Elle VINCENT  Cell [phone]      Patient is a 76y old  Female who presents with a chief complaint of n/v/d (18 Aug 2017 21:48)      Patient seen and examined at bedside. No chest pain/sob    VITALS:  T(F): 98.6 (08-25-17 @ 05:10), Max: 98.6 (08-24-17 @ 21:30)  HR: 106 (08-25-17 @ 05:10)  BP: 98/58 (08-25-17 @ 05:10)  RR: 18 (08-25-17 @ 05:10)  SpO2: 98% (08-25-17 @ 05:10)  Wt(kg): --    08-24 @ 07:01  -  08-25 @ 07:00  --------------------------------------------------------  IN: 800 mL / OUT: 0 mL / NET: 800 mL    08-25 @ 07:01  -  08-25 @ 11:10  --------------------------------------------------------  IN: 180 mL / OUT: 0 mL / NET: 180 mL          PHYSICAL EXAM:  Constitutional: NAD  Neck: No JVD  Respiratory: CTAB, no wheezes, rales or rhonchi  Cardiovascular: S1, S2, RRR  Gastrointestinal: BS+, soft, NT/ND  Extremities: trace peripheral edema    Hospital Medications:   MEDICATIONS  (STANDING):  atorvastatin 40 milliGRAM(s) Oral at bedtime  ferrous    sulfate 325 milliGRAM(s) Oral daily  insulin lispro (HumaLOG) corrective regimen sliding scale   SubCutaneous three times a day before meals  insulin lispro (HumaLOG) corrective regimen sliding scale   SubCutaneous at bedtime  dextrose 5%. 1000 milliLiter(s) (50 mL/Hr) IV Continuous <Continuous>  dextrose 50% Injectable 12.5 Gram(s) IV Push once  dextrose 50% Injectable 25 Gram(s) IV Push once  dextrose 50% Injectable 25 Gram(s) IV Push once  ondansetron    Tablet 8 milliGRAM(s) Oral two times a day  pantoprazole    Tablet 40 milliGRAM(s) Oral two times a day before meals  metoprolol 25 milliGRAM(s) Oral two times a day  heparin  Infusion 800 Unit(s)/Hr (9 mL/Hr) IV Continuous <Continuous>  docusate sodium 100 milliGRAM(s) Oral two times a day  senna 2 Tablet(s) Oral at bedtime  polyethylene glycol 3350 17 Gram(s) Oral daily  furosemide    Tablet 40 milliGRAM(s) Oral daily      LABS:  08-25    134<L>  |  95<L>  |  27<H>  ----------------------------<  155<H>  3.7   |  25  |  1.26    Ca    9.0      25 Aug 2017 07:00  Mg     1.6     08-25      Creatinine Trend: 1.26 <--, 1.31 <--, 1.38 <--, 1.20 <--, 1.20 <--, 1.33 <--, 1.48 <--, 1.55 <--                                8.9    10.92 )-----------( 274      ( 25 Aug 2017 07:00 )             27.5     Urine Studies:  Urinalysis - [08-23-17 @ 07:50]      Color YELLOW / Appearance CLEAR / SG 1.013 / pH 6.0      Gluc NEGATIVE / Ketone NEGATIVE  / Bili NEGATIVE / Urobili NORMAL       Blood NEGATIVE / Protein NEGATIVE / Leuk Est NEGATIVE / Nitrite NEGATIVE      RBC 0-2 / WBC 0-2 / Hyaline 10-25 / Gran 2-5 / Sq Epi OCC / Non Sq Epi  / Bacteria FEW      Iron 35, TIBC 358, %sat 10      [07-31-17 @ 06:37]  Ferritin 56.0      [07-31-17 @ 10:21]  Vitamin D (25OH) 17.3      [07-31-17 @ 10:21]  HbA1c 6.4      [07-31-17 @ 10:21]  TSH 1.10      [08-04-17 @ 03:50]  Lipid: chol 107, TG 60, HDL 40, LDL 59      [08-04-17 @ 03:50]        RADIOLOGY & ADDITIONAL STUDIES:
Dr. Lazcano (Nephrology)  Office [phone]  Cell [phone]  Elle VINCENT  Cell [phone]      Patient is a 76y old  Female who presents with a chief complaint of n/v/d (18 Aug 2017 21:48)      Patient seen and examined at bedside. No chest pain/sob    VITALS:  T(F): 98.6 (08-19-17 @ 13:30), Max: 98.9 (08-19-17 @ 03:27)  HR: 119 (08-19-17 @ 13:30)  BP: 81/47 (08-19-17 @ 13:30)  RR: 18 (08-19-17 @ 13:30)  SpO2: 97% (08-19-17 @ 13:30)  Wt(kg): --    08-18 @ 07:01  -  08-19 @ 07:00  --------------------------------------------------------  IN: 200 mL / OUT: 0 mL / NET: 200 mL    08-19 @ 07:01  -  08-19 @ 14:18  --------------------------------------------------------  IN: 740 mL / OUT: 0 mL / NET: 740 mL      Height (cm): 162.56 (08-18 @ 14:43)  Weight (kg): 108.9 (08-18 @ 14:43)  BMI (kg/m2): 41.2 (08-18 @ 14:43)  BSA (m2): 2.11 (08-18 @ 14:43)    PHYSICAL EXAM:  Constitutional: NAD  Neck: No JVD  Respiratory: CTAB, no wheezes, rales or rhonchi  Cardiovascular: S1, S2, RRR  Gastrointestinal: BS+, soft, NT/ND  Extremities: No peripheral edema    Hospital Medications:   MEDICATIONS  (STANDING):  atorvastatin 40 milliGRAM(s) Oral at bedtime  ferrous    sulfate 325 milliGRAM(s) Oral daily  insulin lispro (HumaLOG) corrective regimen sliding scale   SubCutaneous three times a day before meals  insulin lispro (HumaLOG) corrective regimen sliding scale   SubCutaneous at bedtime  dextrose 5%. 1000 milliLiter(s) (50 mL/Hr) IV Continuous <Continuous>  dextrose 50% Injectable 12.5 Gram(s) IV Push once  dextrose 50% Injectable 25 Gram(s) IV Push once  dextrose 50% Injectable 25 Gram(s) IV Push once  loperamide 2 milliGRAM(s) Oral two times a day  ondansetron    Tablet 8 milliGRAM(s) Oral two times a day  furosemide   Injectable 20 milliGRAM(s) IV Push once  pantoprazole Infusion 8 mG/Hr (10 mL/Hr) IV Continuous <Continuous>      LABS:  08-19    144  |  110<H>  |  102<H>  ----------------------------<  198<H>  5.0   |  19<L>  |  1.48<H>    Ca    8.8      19 Aug 2017 06:20  Phos  2.2     08-18  Mg     1.7     08-18    TPro  5.9<L>  /  Alb  3.2<L>  /  TBili  0.5  /  DBili      /  AST  15  /  ALT  11  /  AlkPhos  41  08-18    Creatinine Trend: 1.48 <--, 1.55 <--, 2.27 <--, 2.59 <--    Phosphorus Level, Serum: 2.2 mg/dL (08-18 @ 17:45)  Albumin, Serum: 3.2 g/dL (08-18 @ 17:45)                              6.7    9.02  )-----------( 282      ( 19 Aug 2017 06:20 )             22.1     Urine Studies:  Urinalysis - [08-06-17 @ 07:46]      Color YELLOW / Appearance CLEAR / SG 1.011 / pH 6.0      Gluc NEGATIVE / Ketone NEGATIVE  / Bili NEGATIVE / Urobili NORMAL       Blood NEGATIVE / Protein 20 / Leuk Est NEGATIVE / Nitrite NEGATIVE      RBC 0-2 / WBC 2-5 / Hyaline  / Gran  / Sq Epi  / Non Sq Epi  / Bacteria       Iron 35, TIBC 358, %sat 10      [07-31-17 @ 06:37]  Ferritin 56.0      [07-31-17 @ 10:21]  Vitamin D (25OH) 17.3      [07-31-17 @ 10:21]  HbA1c 6.4      [07-31-17 @ 10:21]  TSH 1.10      [08-04-17 @ 03:50]  Lipid: chol 107, TG 60, HDL 40, LDL 59      [08-04-17 @ 03:50]        RADIOLOGY & ADDITIONAL STUDIES:
Dr. Lazcano (Nephrology)  Office [phone]  Cell [phone]  Elle VINCENT  Cell [phone]      Patient is a 76y old  Female who presents with a chief complaint of n/v/d (18 Aug 2017 21:48)      Patient seen and examined at bedside. No chest pain/sob    VITALS:  T(F): 97.9 (08-24-17 @ 14:09), Max: 98.8 (08-23-17 @ 22:00)  HR: 67 (08-24-17 @ 14:09)  BP: 103/70 (08-24-17 @ 14:09)  RR: 18 (08-24-17 @ 14:09)  SpO2: 94% (08-24-17 @ 14:09)  Wt(kg): --    08-23 @ 07:01  -  08-24 @ 07:00  --------------------------------------------------------  IN: 280 mL / OUT: 0 mL / NET: 280 mL    08-24 @ 07:01  -  08-24 @ 16:19  --------------------------------------------------------  IN: 360 mL / OUT: 0 mL / NET: 360 mL          PHYSICAL EXAM:  Constitutional: NAD  Neck: No JVD  Respiratory: CTAB, no wheezes, rales or rhonchi  Cardiovascular: S1, S2, RRR  Gastrointestinal: BS+, soft, NT/ND  Extremities: trace peripheral edema    Hospital Medications:   MEDICATIONS  (STANDING):  atorvastatin 40 milliGRAM(s) Oral at bedtime  ferrous    sulfate 325 milliGRAM(s) Oral daily  insulin lispro (HumaLOG) corrective regimen sliding scale   SubCutaneous three times a day before meals  insulin lispro (HumaLOG) corrective regimen sliding scale   SubCutaneous at bedtime  dextrose 5%. 1000 milliLiter(s) (50 mL/Hr) IV Continuous <Continuous>  dextrose 50% Injectable 12.5 Gram(s) IV Push once  dextrose 50% Injectable 25 Gram(s) IV Push once  dextrose 50% Injectable 25 Gram(s) IV Push once  ondansetron    Tablet 8 milliGRAM(s) Oral two times a day  pantoprazole    Tablet 40 milliGRAM(s) Oral two times a day before meals  metoprolol 25 milliGRAM(s) Oral two times a day  heparin  Infusion 800 Unit(s)/Hr (9 mL/Hr) IV Continuous <Continuous>  docusate sodium 100 milliGRAM(s) Oral two times a day  senna 2 Tablet(s) Oral at bedtime  polyethylene glycol 3350 17 Gram(s) Oral daily  warfarin 4 milliGRAM(s) Oral once      LABS:  08-24    135  |  96<L>  |  27<H>  ----------------------------<  141<H>  3.7   |  26  |  1.31<H>    Ca    8.7      24 Aug 2017 07:05  Mg     1.5     08-23      Creatinine Trend: 1.31 <--, 1.38 <--, 1.20 <--, 1.20 <--, 1.33 <--, 1.48 <--, 1.55 <--, 2.27 <--, 2.59 <--                                9.8    10.82 )-----------( 256      ( 24 Aug 2017 07:05 )             30.6     Urine Studies:  Urinalysis - [08-23-17 @ 07:50]      Color YELLOW / Appearance CLEAR / SG 1.013 / pH 6.0      Gluc NEGATIVE / Ketone NEGATIVE  / Bili NEGATIVE / Urobili NORMAL       Blood NEGATIVE / Protein NEGATIVE / Leuk Est NEGATIVE / Nitrite NEGATIVE      RBC 0-2 / WBC 0-2 / Hyaline 10-25 / Gran 2-5 / Sq Epi OCC / Non Sq Epi  / Bacteria FEW      Iron 35, TIBC 358, %sat 10      [07-31-17 @ 06:37]  Ferritin 56.0      [07-31-17 @ 10:21]  Vitamin D (25OH) 17.3      [07-31-17 @ 10:21]  HbA1c 6.4      [07-31-17 @ 10:21]  TSH 1.10      [08-04-17 @ 03:50]  Lipid: chol 107, TG 60, HDL 40, LDL 59      [08-04-17 @ 03:50]        RADIOLOGY & ADDITIONAL STUDIES:
Dr. Lazcano (Nephrology)  Office [phone]  Cell [phone]  Elle VINCENT  Cell [phone]      Patient is a 76y old  Female who presents with a chief complaint of n/v/d (18 Aug 2017 21:48)      Patient seen and examined at bedside. No chest pain/sob    VITALS:  T(F): 97.9 (08-22-17 @ 08:00), Max: 98.5 (08-21-17 @ 19:21)  HR: 130 (08-22-17 @ 08:00)  BP: 111/87 (08-22-17 @ 08:00)  RR: 18 (08-22-17 @ 08:00)  SpO2: 96% (08-22-17 @ 08:00)  Wt(kg): --    08-21 @ 07:01  -  08-22 @ 07:00  --------------------------------------------------------  IN: 340 mL / OUT: 0 mL / NET: 340 mL    08-22 @ 07:01  -  08-22 @ 12:35  --------------------------------------------------------  IN: 120 mL / OUT: 0 mL / NET: 120 mL          PHYSICAL EXAM:  Constitutional: NAD  Neck: No JVD  Respiratory: CTAB, no wheezes, rales or rhonchi  Cardiovascular: S1, S2, RRR  Gastrointestinal: BS+, soft, NT/ND  Extremities: trace peripheral edema    Hospital Medications:   MEDICATIONS  (STANDING):  atorvastatin 40 milliGRAM(s) Oral at bedtime  ferrous    sulfate 325 milliGRAM(s) Oral daily  insulin lispro (HumaLOG) corrective regimen sliding scale   SubCutaneous three times a day before meals  insulin lispro (HumaLOG) corrective regimen sliding scale   SubCutaneous at bedtime  dextrose 5%. 1000 milliLiter(s) (50 mL/Hr) IV Continuous <Continuous>  dextrose 50% Injectable 12.5 Gram(s) IV Push once  dextrose 50% Injectable 25 Gram(s) IV Push once  dextrose 50% Injectable 25 Gram(s) IV Push once  loperamide 2 milliGRAM(s) Oral two times a day  ondansetron    Tablet 8 milliGRAM(s) Oral two times a day  furosemide    Tablet 40 milliGRAM(s) Oral two times a day  pantoprazole    Tablet 40 milliGRAM(s) Oral two times a day before meals  metoprolol 25 milliGRAM(s) Oral two times a day      LABS:  08-22    138  |  96<L>  |  29<H>  ----------------------------<  153<H>  3.7   |  23  |  1.20    Ca    8.9      22 Aug 2017 05:00  Phos  2.9     08-22  Mg     1.3     08-21      Creatinine Trend: 1.20 <--, 1.20 <--, 1.33 <--, 1.48 <--, 1.55 <--, 2.27 <--, 2.59 <--    Phosphorus Level, Serum: 2.9 mg/dL (08-22 @ 05:00)                              10.2   12.54 )-----------( 257      ( 22 Aug 2017 05:00 )             30.3     Urine Studies:  Urinalysis - [08-06-17 @ 07:46]      Color YELLOW / Appearance CLEAR / SG 1.011 / pH 6.0      Gluc NEGATIVE / Ketone NEGATIVE  / Bili NEGATIVE / Urobili NORMAL       Blood NEGATIVE / Protein 20 / Leuk Est NEGATIVE / Nitrite NEGATIVE      RBC 0-2 / WBC 2-5 / Hyaline  / Gran  / Sq Epi  / Non Sq Epi  / Bacteria       Iron 35, TIBC 358, %sat 10      [07-31-17 @ 06:37]  Ferritin 56.0      [07-31-17 @ 10:21]  Vitamin D (25OH) 17.3      [07-31-17 @ 10:21]  HbA1c 6.4      [07-31-17 @ 10:21]  TSH 1.10      [08-04-17 @ 03:50]  Lipid: chol 107, TG 60, HDL 40, LDL 59      [08-04-17 @ 03:50]        RADIOLOGY & ADDITIONAL STUDIES:
Dr. Lazcano (Nephrology)  Office [phone]  Cell [phone]  Elle VINCENT  Cell [phone]      Patient is a 76y old  Female who presents with a chief complaint of n/v/d (18 Aug 2017 21:48)      Patient seen and examined at bedside. No chest pain/sob    VITALS:  T(F): 98.5 (08-23-17 @ 18:24), Max: 98.8 (08-23-17 @ 14:40)  HR: 93 (08-23-17 @ 18:24)  BP: 108/72 (08-23-17 @ 18:24)  RR: 17 (08-23-17 @ 18:24)  SpO2: 97% (08-23-17 @ 18:24)  Wt(kg): --    08-22 @ 07:01  -  08-23 @ 07:00  --------------------------------------------------------  IN: 840 mL / OUT: 0 mL / NET: 840 mL    08-23 @ 07:01  -  08-23 @ 18:49  --------------------------------------------------------  IN: 280 mL / OUT: 0 mL / NET: 280 mL          PHYSICAL EXAM:  Constitutional: NAD  Neck: No JVD  Respiratory: CTAB, no wheezes, rales or rhonchi  Cardiovascular: S1, S2, RRR  Gastrointestinal: BS+, soft, NT/ND  Extremities: trace peripheral edema    Hospital Medications:   MEDICATIONS  (STANDING):  atorvastatin 40 milliGRAM(s) Oral at bedtime  ferrous    sulfate 325 milliGRAM(s) Oral daily  insulin lispro (HumaLOG) corrective regimen sliding scale   SubCutaneous three times a day before meals  insulin lispro (HumaLOG) corrective regimen sliding scale   SubCutaneous at bedtime  dextrose 5%. 1000 milliLiter(s) (50 mL/Hr) IV Continuous <Continuous>  dextrose 50% Injectable 12.5 Gram(s) IV Push once  dextrose 50% Injectable 25 Gram(s) IV Push once  dextrose 50% Injectable 25 Gram(s) IV Push once  ondansetron    Tablet 8 milliGRAM(s) Oral two times a day  furosemide    Tablet 40 milliGRAM(s) Oral two times a day  pantoprazole    Tablet 40 milliGRAM(s) Oral two times a day before meals  metoprolol 25 milliGRAM(s) Oral two times a day  heparin  Infusion 800 Unit(s)/Hr (12 mL/Hr) IV Continuous <Continuous>  docusate sodium 100 milliGRAM(s) Oral two times a day  senna 2 Tablet(s) Oral at bedtime  polyethylene glycol 3350 17 Gram(s) Oral daily      LABS:  08-23    136  |  96<L>  |  29<H>  ----------------------------<  152<H>  3.7   |  29  |  1.38<H>    Ca    8.7      23 Aug 2017 07:35  Phos  2.9     08-22  Mg     1.5     08-23      Creatinine Trend: 1.38 <--, 1.20 <--, 1.20 <--, 1.33 <--, 1.48 <--, 1.55 <--, 2.27 <--, 2.59 <--                                9.0    11.74 )-----------( 240      ( 23 Aug 2017 07:35 )             27.6     Urine Studies:  Urinalysis - [08-23-17 @ 07:50]      Color YELLOW / Appearance CLEAR / SG 1.013 / pH 6.0      Gluc NEGATIVE / Ketone NEGATIVE  / Bili NEGATIVE / Urobili NORMAL       Blood NEGATIVE / Protein NEGATIVE / Leuk Est NEGATIVE / Nitrite NEGATIVE      RBC 0-2 / WBC 0-2 / Hyaline 10-25 / Gran 2-5 / Sq Epi OCC / Non Sq Epi  / Bacteria FEW      Iron 35, TIBC 358, %sat 10      [07-31-17 @ 06:37]  Ferritin 56.0      [07-31-17 @ 10:21]  Vitamin D (25OH) 17.3      [07-31-17 @ 10:21]  HbA1c 6.4      [07-31-17 @ 10:21]  TSH 1.10      [08-04-17 @ 03:50]  Lipid: chol 107, TG 60, HDL 40, LDL 59      [08-04-17 @ 03:50]        RADIOLOGY & ADDITIONAL STUDIES:
Dr. Lazcano (Nephrology)  Office [phone]  Cell [phone]  Elle VINCENT  Cell [phone]      Patient is a 76y old  Female who presents with a chief complaint of n/v/d (18 Aug 2017 21:48)      Patient seen and examined at bedside. No chest pain/sob    VITALS:  T(F): 98.1 (08-21-17 @ 08:42), Max: 98.7 (08-20-17 @ 14:29)  HR: 67 (08-21-17 @ 08:42)  BP: 119/44 (08-21-17 @ 11:27)  RR: 17 (08-21-17 @ 08:42)  SpO2: 100% (08-21-17 @ 08:42)  Wt(kg): --    08-20 @ 07:01  -  08-21 @ 07:00  --------------------------------------------------------  IN: 850 mL / OUT: 0 mL / NET: 850 mL    08-21 @ 07:01  -  08-21 @ 12:37  --------------------------------------------------------  IN: 220 mL / OUT: 0 mL / NET: 220 mL          PHYSICAL EXAM:  Constitutional: NAD  Neck: No JVD  Respiratory: CTAB, no wheezes, rales or rhonchi  Cardiovascular: S1, S2, RRR  Gastrointestinal: BS+, soft, NT/ND  Extremities: No peripheral edema    Hospital Medications:   MEDICATIONS  (STANDING):  atorvastatin 40 milliGRAM(s) Oral at bedtime  ferrous    sulfate 325 milliGRAM(s) Oral daily  insulin lispro (HumaLOG) corrective regimen sliding scale   SubCutaneous three times a day before meals  insulin lispro (HumaLOG) corrective regimen sliding scale   SubCutaneous at bedtime  dextrose 5%. 1000 milliLiter(s) (50 mL/Hr) IV Continuous <Continuous>  dextrose 50% Injectable 12.5 Gram(s) IV Push once  dextrose 50% Injectable 25 Gram(s) IV Push once  dextrose 50% Injectable 25 Gram(s) IV Push once  loperamide 2 milliGRAM(s) Oral two times a day  ondansetron    Tablet 8 milliGRAM(s) Oral two times a day  furosemide    Tablet 40 milliGRAM(s) Oral two times a day  pantoprazole    Tablet 40 milliGRAM(s) Oral two times a day before meals      LABS:  08-21    139  |  101  |  40<H>  ----------------------------<  155<H>  3.8   |  24  |  1.20    Ca    9.0      21 Aug 2017 05:04  Phos  1.9     08-20  Mg     1.3     08-21    TPro  5.3<L>  /  Alb  2.8<L>  /  TBili  1.1  /  DBili      /  AST  16  /  ALT  11  /  AlkPhos  34<L>  08-20    Creatinine Trend: 1.20 <--, 1.33 <--, 1.48 <--, 1.55 <--, 2.27 <--, 2.59 <--                                10.0   10.84 )-----------( 234      ( 21 Aug 2017 05:04 )             30.6     Urine Studies:  Urinalysis - [08-06-17 @ 07:46]      Color YELLOW / Appearance CLEAR / SG 1.011 / pH 6.0      Gluc NEGATIVE / Ketone NEGATIVE  / Bili NEGATIVE / Urobili NORMAL       Blood NEGATIVE / Protein 20 / Leuk Est NEGATIVE / Nitrite NEGATIVE      RBC 0-2 / WBC 2-5 / Hyaline  / Gran  / Sq Epi  / Non Sq Epi  / Bacteria       Iron 35, TIBC 358, %sat 10      [07-31-17 @ 06:37]  Ferritin 56.0      [07-31-17 @ 10:21]  Vitamin D (25OH) 17.3      [07-31-17 @ 10:21]  HbA1c 6.4      [07-31-17 @ 10:21]  TSH 1.10      [08-04-17 @ 03:50]  Lipid: chol 107, TG 60, HDL 40, LDL 59      [08-04-17 @ 03:50]        RADIOLOGY & ADDITIONAL STUDIES:
Dr. Lazcano (Nephrology)  Office [phone]  Cell [phone]  Elle VINCENT  Cell [phone]      Patient is a 76y old  Female who presents with a chief complaint of n/v/d (29 Aug 2017 09:23)      Patient seen and examined at bedside. No chest pain/sob    VITALS:  T(F): 97.4 (08-29-17 @ 06:28), Max: 97.6 (08-28-17 @ 17:56)  HR: 60 (08-29-17 @ 06:28)  BP: 115/73 (08-29-17 @ 06:28)  RR: 12 (08-29-17 @ 06:28)  SpO2: 100% (08-29-17 @ 06:28)  Wt(kg): --    08-28 @ 07:01  -  08-29 @ 07:00  --------------------------------------------------------  IN: 720 mL / OUT: 0 mL / NET: 720 mL    08-29 @ 07:01  -  08-29 @ 13:36  --------------------------------------------------------  IN: 120 mL / OUT: 0 mL / NET: 120 mL          PHYSICAL EXAM:  Constitutional: NAD  Neck: No JVD  Respiratory: CTAB, no wheezes, rales or rhonchi  Cardiovascular: S1, S2, RRR  Gastrointestinal: BS+, soft, NT/ND  Extremities: No peripheral edema    Hospital Medications:   MEDICATIONS  (STANDING):  atorvastatin 40 milliGRAM(s) Oral at bedtime  ferrous    sulfate 325 milliGRAM(s) Oral daily  insulin lispro (HumaLOG) corrective regimen sliding scale   SubCutaneous three times a day before meals  insulin lispro (HumaLOG) corrective regimen sliding scale   SubCutaneous at bedtime  dextrose 5%. 1000 milliLiter(s) (50 mL/Hr) IV Continuous <Continuous>  dextrose 50% Injectable 12.5 Gram(s) IV Push once  dextrose 50% Injectable 25 Gram(s) IV Push once  dextrose 50% Injectable 25 Gram(s) IV Push once  ondansetron    Tablet 8 milliGRAM(s) Oral two times a day  pantoprazole    Tablet 40 milliGRAM(s) Oral two times a day before meals  metoprolol 25 milliGRAM(s) Oral two times a day  docusate sodium 100 milliGRAM(s) Oral two times a day  senna 2 Tablet(s) Oral at bedtime  polyethylene glycol 3350 17 Gram(s) Oral daily  furosemide    Tablet 40 milliGRAM(s) Oral daily      LABS:  08-29    138  |  97<L>  |  40<H>  ----------------------------<  121<H>  4.0   |  24  |  1.30    Ca    9.9      29 Aug 2017 05:30  Mg     1.7     08-29      Creatinine Trend: 1.30 <--, 1.34 <--, 1.16 <--, 1.09 <--, 1.26 <--, 1.31 <--, 1.38 <--                                10.0   8.87  )-----------( 376      ( 29 Aug 2017 05:30 )             31.5     Urine Studies:  Urinalysis - [08-23-17 @ 07:50]      Color YELLOW / Appearance CLEAR / SG 1.013 / pH 6.0      Gluc NEGATIVE / Ketone NEGATIVE  / Bili NEGATIVE / Urobili NORMAL       Blood NEGATIVE / Protein NEGATIVE / Leuk Est NEGATIVE / Nitrite NEGATIVE      RBC 0-2 / WBC 0-2 / Hyaline 10-25 / Gran 2-5 / Sq Epi OCC / Non Sq Epi  / Bacteria FEW      Iron 35, TIBC 358, %sat 10      [07-31-17 @ 06:37]  Ferritin 56.0      [07-31-17 @ 10:21]  Vitamin D (25OH) 17.3      [07-31-17 @ 10:21]  HbA1c 6.4      [07-31-17 @ 10:21]  TSH 1.10      [08-04-17 @ 03:50]  Lipid: chol 107, TG 60, HDL 40, LDL 59      [08-04-17 @ 03:50]        RADIOLOGY & ADDITIONAL STUDIES:
LUCY LIM    HPI:  This is a patient with SHELBY in the setting of hypotension and GI bleed.     HEALTH ISSUES - PROBLEM Dx:  Hypomagnesemia: Hypomagnesemia  Hypophosphatemia: Hypophosphatemia  Coagulopathy: Coagulopathy  Anemia due to blood loss: Anemia due to blood loss  Atrial fibrillation: Atrial fibrillation  Hypotension: Hypotension  Hypertension: Hypertension  CHF (congestive heart failure): CHF (congestive heart failure)  Abdominal pain: Abdominal pain  SHELBY (acute kidney injury): SHELBY (acute kidney injury)  Hyponatremia: Hyponatremia        MEDICATIONS  (STANDING):  atorvastatin 40 milliGRAM(s) Oral at bedtime  ferrous    sulfate 325 milliGRAM(s) Oral daily  insulin lispro (HumaLOG) corrective regimen sliding scale   SubCutaneous three times a day before meals  insulin lispro (HumaLOG) corrective regimen sliding scale   SubCutaneous at bedtime  dextrose 5%. 1000 milliLiter(s) (50 mL/Hr) IV Continuous <Continuous>  dextrose 50% Injectable 12.5 Gram(s) IV Push once  dextrose 50% Injectable 25 Gram(s) IV Push once  dextrose 50% Injectable 25 Gram(s) IV Push once  ondansetron    Tablet 8 milliGRAM(s) Oral two times a day  pantoprazole    Tablet 40 milliGRAM(s) Oral two times a day before meals  metoprolol 25 milliGRAM(s) Oral two times a day  docusate sodium 100 milliGRAM(s) Oral two times a day  senna 2 Tablet(s) Oral at bedtime  polyethylene glycol 3350 17 Gram(s) Oral daily  furosemide    Tablet 40 milliGRAM(s) Oral daily  predniSONE   Tablet 20 milliGRAM(s) Oral daily    MEDICATIONS  (PRN):  simethicone 80 milliGRAM(s) Chew every 6 hours PRN Gas  acetaminophen   Tablet. 650 milliGRAM(s) Oral every 6 hours PRN mild, moderate pain  dextrose Gel 1 Dose(s) Oral once PRN Blood Glucose LESS THAN 70 milliGRAM(s)/deciliter  glucagon  Injectable 1 milliGRAM(s) IntraMuscular once PRN Glucose LESS THAN 70 milligrams/deciliter  sodium chloride 0.65% Nasal 1 Spray(s) Both Nostrils three times a day PRN Nasal Congestion    Vital Signs Last 24 Hrs  T(C): 36.5 (27 Aug 2017 12:39), Max: 36.8 (26 Aug 2017 23:07)  T(F): 97.7 (27 Aug 2017 12:39), Max: 98.2 (26 Aug 2017 23:07)  HR: 93 (27 Aug 2017 17:37) (60 - 100)  BP: 113/76 (27 Aug 2017 17:37) (101/63 - 120/65)  BP(mean): --  RR: 16 (27 Aug 2017 17:37) (16 - 18)  SpO2: 100% (27 Aug 2017 17:37) (98% - 100%)  Daily     Daily Weight in k (27 Aug 2017 05:42)    PHYSICAL EXAM:  Constitutional:  She appears comfortable and not distressed. Not diaphoretic.    Neck:  The thyroid is normal. Trachea is midline.     Respiratory: The lungs are clear to auscultation. No dullness and expansion is normal.    Cardiovascular: S1 and S2 are normal. No mummurs, rubs or gallops are present.    Gastrointestinal: The abdomen is soft. No tenderness is present. No masses are present. Bowel sounds are normal.    Genitourinary: The bladder is not distended. No CVA tenderness is present.    Extremities: No edema is noted. No deformities are present.    Neurological: Cognition is normal. Tone, power and sensation are normal.     Skin: No leasions are seen  or palpated.    Lymph Nodes: No lymphadenopathy is present.                              9.1    7.54  )-----------( 333      ( 27 Aug 2017 06:00 )             28.4     08    138  |  98  |  28<H>  ----------------------------<  113<H>  4.3   |  26  |  1.16    Ca    9.5      27 Aug 2017 06:00  Mg     1.7     
LUCY LIM  76y  Patient is a 76y old  Female who presents with a chief complaint of n/v/d (18 Aug 2017 21:48)    HPI:  She was followed for SHELBY with GIB and hypotension. This has improved with hydration. She has a history of CHF so the hydration was stopped.    HEALTH ISSUES - PROBLEM Dx:  Hypomagnesemia: Hypomagnesemia  Hypophosphatemia: Hypophosphatemia  Coagulopathy: Coagulopathy  Anemia due to blood loss: Anemia due to blood loss  Atrial fibrillation: Atrial fibrillation  Hypotension: Hypotension  Hypertension: Hypertension  CHF (congestive heart failure): CHF (congestive heart failure)  Abdominal pain: Abdominal pain  SHELBY (acute kidney injury): SHELBY (acute kidney injury)  Hyponatremia: Hyponatremia        MEDICATIONS  (STANDING):  atorvastatin 40 milliGRAM(s) Oral at bedtime  ferrous    sulfate 325 milliGRAM(s) Oral daily  insulin lispro (HumaLOG) corrective regimen sliding scale   SubCutaneous three times a day before meals  insulin lispro (HumaLOG) corrective regimen sliding scale   SubCutaneous at bedtime  dextrose 5%. 1000 milliLiter(s) (50 mL/Hr) IV Continuous <Continuous>  dextrose 50% Injectable 12.5 Gram(s) IV Push once  dextrose 50% Injectable 25 Gram(s) IV Push once  dextrose 50% Injectable 25 Gram(s) IV Push once  ondansetron    Tablet 8 milliGRAM(s) Oral two times a day  pantoprazole    Tablet 40 milliGRAM(s) Oral two times a day before meals  metoprolol 25 milliGRAM(s) Oral two times a day  docusate sodium 100 milliGRAM(s) Oral two times a day  senna 2 Tablet(s) Oral at bedtime  polyethylene glycol 3350 17 Gram(s) Oral daily  furosemide    Tablet 40 milliGRAM(s) Oral daily  predniSONE   Tablet 20 milliGRAM(s) Oral daily  warfarin 4 milliGRAM(s) Oral once    MEDICATIONS  (PRN):  simethicone 80 milliGRAM(s) Chew every 6 hours PRN Gas  acetaminophen   Tablet. 650 milliGRAM(s) Oral every 6 hours PRN mild, moderate pain  dextrose Gel 1 Dose(s) Oral once PRN Blood Glucose LESS THAN 70 milliGRAM(s)/deciliter  glucagon  Injectable 1 milliGRAM(s) IntraMuscular once PRN Glucose LESS THAN 70 milligrams/deciliter  sodium chloride 0.65% Nasal 1 Spray(s) Both Nostrils three times a day PRN Nasal Congestion    Vital Signs Last 24 Hrs  T(C): 36.5 (26 Aug 2017 05:13), Max: 37.2 (25 Aug 2017 15:02)  T(F): 97.7 (26 Aug 2017 05:13), Max: 98.9 (25 Aug 2017 15:02)  HR: 70 (26 Aug 2017 05:13) (64 - 83)  BP: 102/66 (26 Aug 2017 05:13) (100/54 - 105/61)  BP(mean): --  RR: 17 (26 Aug 2017 05:13) (17 - 18)  SpO2: 99% (26 Aug 2017 05:13) (98% - 100%)  Daily     Daily Weight in k.7 (26 Aug 2017 06:32)    PHYSICAL EXAM:  Constitutional:  She appears comfortable and not distressed. Not diaphoretic.    Neck:  The thyroid is normal. Trachea is midline.     Respiratory: The lungs are clear to auscultation. No dullness and expansion is normal.    Cardiovascular: S1 and S2 are normal. No mummurs, rubs or gallops are present.    Gastrointestinal: The abdomen is soft. No tenderness is present. No masses are present. Bowel sounds are normal.    Genitourinary: The bladder is not distended. No CVA tenderness is present.    Extremities: No edema is noted. No deformities are present.    Neurological: Cognition is normal. Tone, power and sensation are normal.     Skin: No lesions are seen  or palpated.    Lymph Nodes: No lymphadenopathy is present.                              8.7    7.38  )-----------( 287      ( 26 Aug 2017 06:00 )             26.9     08    136  |  99  |  25<H>  ----------------------------<  170<H>  4.8   |  24  |  1.09    Ca    9.3      26 Aug 2017 06:00  Mg     1.6     
LUCY LIM:[id number],   76yFemale followed for:  clavulanate (Unknown)  erythromycin (Hives; Rash)    PAST MEDICAL & SURGICAL HISTORY:  Diabetes mellitus  Atrial fibrillation: on coumadin  CHF (congestive heart failure)  Hypertension  No significant past surgical history    FAMILY HISTORY:  No pertinent family history in first degree relatives    MEDICATIONS  (STANDING):  atorvastatin 40 milliGRAM(s) Oral at bedtime  ferrous    sulfate 325 milliGRAM(s) Oral daily  insulin lispro (HumaLOG) corrective regimen sliding scale   SubCutaneous three times a day before meals  insulin lispro (HumaLOG) corrective regimen sliding scale   SubCutaneous at bedtime  dextrose 5%. 1000 milliLiter(s) (50 mL/Hr) IV Continuous <Continuous>  dextrose 50% Injectable 12.5 Gram(s) IV Push once  dextrose 50% Injectable 25 Gram(s) IV Push once  dextrose 50% Injectable 25 Gram(s) IV Push once  loperamide 2 milliGRAM(s) Oral two times a day  ondansetron    Tablet 8 milliGRAM(s) Oral two times a day    MEDICATIONS  (PRN):  simethicone 80 milliGRAM(s) Chew every 6 hours PRN Gas  acetaminophen   Tablet. 650 milliGRAM(s) Oral every 6 hours PRN mild, moderate pain  dextrose Gel 1 Dose(s) Oral once PRN Blood Glucose LESS THAN 70 milliGRAM(s)/deciliter  glucagon  Injectable 1 milliGRAM(s) IntraMuscular once PRN Glucose LESS THAN 70 milligrams/deciliter      Vital Signs Last 24 Hrs  T(C): 37 (20 Aug 2017 03:47), Max: 37.2 (19 Aug 2017 23:15)  T(F): 98.6 (20 Aug 2017 03:47), Max: 99 (19 Aug 2017 23:15)  HR: 74 (20 Aug 2017 03:47) (60 - 129)  BP: 97/50 (20 Aug 2017 03:47) (81/47 - 107/55)  BP(mean): --  RR: 16 (20 Aug 2017 03:47) (16 - 18)  SpO2: 100% (20 Aug 2017 03:47) (96% - 100%)  nc/at  s1s2  cta  soft, nt, nd no guarding or rebound  no c/c/e    CBC Full  -  ( 20 Aug 2017 05:35 )  WBC Count : 11.07 K/uL  Hemoglobin : 8.5 g/dL  Hematocrit : 25.5 %  Platelet Count - Automated : 222 K/uL  Mean Cell Volume : 72.6 fL  Mean Cell Hemoglobin : 24.2 pg  Mean Cell Hemoglobin Concentration : 33.3 %  Auto Neutrophil # : x  Auto Lymphocyte # : x  Auto Monocyte # : x  Auto Eosinophil # : x  Auto Basophil # : x  Auto Neutrophil % : x  Auto Lymphocyte % : x  Auto Monocyte % : x  Auto Eosinophil % : x  Auto Basophil % : x    08-20    142  |  108<H>  |  70<H>  ----------------------------<  184<H>  4.4   |  20<L>  |  1.33<H>    Ca    8.8      20 Aug 2017 05:35  Phos  1.9     08-20  Mg     1.6     08-20    TPro  5.3<L>  /  Alb  2.8<L>  /  TBili  1.1  /  DBili  x   /  AST  16  /  ALT  11  /  AlkPhos  34<L>  08-20    PT/INR - ( 20 Aug 2017 05:35 )   PT: 50.7 SEC;   INR: 4.39          PTT - ( 19 Aug 2017 06:20 )  PTT:39.3 SEC
LUCY [id number],   76yFemale followed for: abdominal pain, gib  clavulanate (Unknown)  erythromycin (Hives; Rash)    PAST MEDICAL & SURGICAL HISTORY:  Diabetes mellitus  Atrial fibrillation: on coumadin  CHF (congestive heart failure)  Hypertension  No significant past surgical history    FAMILY HISTORY:  No pertinent family history in first degree relatives    MEDICATIONS  (STANDING):  atorvastatin 40 milliGRAM(s) Oral at bedtime  ferrous    sulfate 325 milliGRAM(s) Oral daily  insulin lispro (HumaLOG) corrective regimen sliding scale   SubCutaneous three times a day before meals  insulin lispro (HumaLOG) corrective regimen sliding scale   SubCutaneous at bedtime  dextrose 5%. 1000 milliLiter(s) (50 mL/Hr) IV Continuous <Continuous>  dextrose 50% Injectable 12.5 Gram(s) IV Push once  dextrose 50% Injectable 25 Gram(s) IV Push once  dextrose 50% Injectable 25 Gram(s) IV Push once  ondansetron    Tablet 8 milliGRAM(s) Oral two times a day  pantoprazole    Tablet 40 milliGRAM(s) Oral two times a day before meals  metoprolol 25 milliGRAM(s) Oral two times a day  docusate sodium 100 milliGRAM(s) Oral two times a day  senna 2 Tablet(s) Oral at bedtime  polyethylene glycol 3350 17 Gram(s) Oral daily  furosemide    Tablet 40 milliGRAM(s) Oral daily    MEDICATIONS  (PRN):  simethicone 80 milliGRAM(s) Chew every 6 hours PRN Gas  acetaminophen   Tablet. 650 milliGRAM(s) Oral every 6 hours PRN mild, moderate pain  dextrose Gel 1 Dose(s) Oral once PRN Blood Glucose LESS THAN 70 milliGRAM(s)/deciliter  glucagon  Injectable 1 milliGRAM(s) IntraMuscular once PRN Glucose LESS THAN 70 milligrams/deciliter  sodium chloride 0.65% Nasal 1 Spray(s) Both Nostrils three times a day PRN Nasal Congestion      Vital Signs Last 24 Hrs  T(C): 36.3 (29 Aug 2017 06:28), Max: 36.4 (28 Aug 2017 17:56)  T(F): 97.4 (29 Aug 2017 06:28), Max: 97.6 (28 Aug 2017 17:56)  HR: 60 (29 Aug 2017 06:28) (60 - 118)  BP: 115/73 (29 Aug 2017 06:28) (102/64 - 115/73)  BP(mean): --  RR: 12 (29 Aug 2017 06:28) (12 - 18)  SpO2: 100% (29 Aug 2017 06:28) (98% - 100%)  nc/at  s1s2  cta  soft, nt, nd no guarding or rebound  no c/c/e    CBC Full  -  ( 28 Aug 2017 05:30 )  WBC Count : 8.67 K/uL  Hemoglobin : 9.7 g/dL  Hematocrit : 31.4 %  Platelet Count - Automated : 369 K/uL  Mean Cell Volume : 76.2 fL  Mean Cell Hemoglobin : 23.5 pg  Mean Cell Hemoglobin Concentration : 30.9 %  Auto Neutrophil # : 4.71 K/uL  Auto Lymphocyte # : 3.03 K/uL  Auto Monocyte # : 0.77 K/uL  Auto Eosinophil # : 0.11 K/uL  Auto Basophil # : 0.03 K/uL  Auto Neutrophil % : 54.4 %  Auto Lymphocyte % : 34.9 %  Auto Monocyte % : 8.9 %  Auto Eosinophil % : 1.3 %  Auto Basophil % : 0.3 %    08-28    139  |  99  |  33<H>  ----------------------------<  111<H>  4.5   |  25  |  1.34<H>    Ca    9.8      28 Aug 2017 05:30  Mg     1.7     08-28      PT/INR - ( 28 Aug 2017 05:30 )   PT: 31.8 SEC;   INR: 2.78
LUCY [id number],   76yFemale followed for: abdominal pain and diarrhea  clavulanate (Unknown)  erythromycin (Hives; Rash)    PAST MEDICAL & SURGICAL HISTORY:  Diabetes mellitus  Atrial fibrillation: on coumadin  CHF (congestive heart failure)  Hypertension  No significant past surgical history    FAMILY HISTORY:  No pertinent family history in first degree relatives    MEDICATIONS  (STANDING):  atorvastatin 40 milliGRAM(s) Oral at bedtime  ferrous    sulfate 325 milliGRAM(s) Oral daily  insulin lispro (HumaLOG) corrective regimen sliding scale   SubCutaneous three times a day before meals  insulin lispro (HumaLOG) corrective regimen sliding scale   SubCutaneous at bedtime  dextrose 5%. 1000 milliLiter(s) (50 mL/Hr) IV Continuous <Continuous>  dextrose 50% Injectable 12.5 Gram(s) IV Push once  dextrose 50% Injectable 25 Gram(s) IV Push once  dextrose 50% Injectable 25 Gram(s) IV Push once  loperamide 2 milliGRAM(s) Oral two times a day  ondansetron    Tablet 8 milliGRAM(s) Oral two times a day  furosemide    Tablet 40 milliGRAM(s) Oral two times a day  pantoprazole    Tablet 40 milliGRAM(s) Oral two times a day before meals    MEDICATIONS  (PRN):  simethicone 80 milliGRAM(s) Chew every 6 hours PRN Gas  acetaminophen   Tablet. 650 milliGRAM(s) Oral every 6 hours PRN mild, moderate pain  dextrose Gel 1 Dose(s) Oral once PRN Blood Glucose LESS THAN 70 milliGRAM(s)/deciliter  glucagon  Injectable 1 milliGRAM(s) IntraMuscular once PRN Glucose LESS THAN 70 milligrams/deciliter      Vital Signs Last 24 Hrs  T(C): 36.6 (21 Aug 2017 04:35), Max: 37.1 (20 Aug 2017 14:29)  T(F): 97.9 (21 Aug 2017 04:35), Max: 98.7 (20 Aug 2017 14:29)  HR: 99 (21 Aug 2017 04:35) (63 - 99)  BP: 98/55 (21 Aug 2017 04:35) (94/61 - 112/62)  BP(mean): --  RR: 17 (21 Aug 2017 04:35) (16 - 18)  SpO2: 96% (21 Aug 2017 04:35) (92% - 100%)  nc/at  s1s2  cta  soft, nt, nd no guarding or rebound  no c/c/e    CBC Full  -  ( 21 Aug 2017 05:04 )  WBC Count : 10.84 K/uL  Hemoglobin : 10.0 g/dL  Hematocrit : 30.6 %  Platelet Count - Automated : 234 K/uL  Mean Cell Volume : 73.9 fL  Mean Cell Hemoglobin : 24.2 pg  Mean Cell Hemoglobin Concentration : 32.7 %  Auto Neutrophil # : x  Auto Lymphocyte # : x  Auto Monocyte # : x  Auto Eosinophil # : x  Auto Basophil # : x  Auto Neutrophil % : x  Auto Lymphocyte % : x  Auto Monocyte % : x  Auto Eosinophil % : x  Auto Basophil % : x    08-21    139  |  101  |  40<H>  ----------------------------<  155<H>  3.8   |  24  |  1.20    Ca    9.0      21 Aug 2017 05:04  Phos  1.9     08-20  Mg     1.3     08-21    TPro  5.3<L>  /  Alb  2.8<L>  /  TBili  1.1  /  DBili  x   /  AST  16  /  ALT  11  /  AlkPhos  34<L>  08-20    PT/INR - ( 21 Aug 2017 05:04 )   PT: 39.3 SEC;   INR: 3.42
LUCY [id number],   76yFemale followed for: abdominal pain, nausea and diarrhea  clavulanate (Unknown)  erythromycin (Hives; Rash)    PAST MEDICAL & SURGICAL HISTORY:  Diabetes mellitus  Atrial fibrillation: on coumadin  CHF (congestive heart failure)  Hypertension  No significant past surgical history    FAMILY HISTORY:  No pertinent family history in first degree relatives    MEDICATIONS  (STANDING):  atorvastatin 40 milliGRAM(s) Oral at bedtime  ferrous    sulfate 325 milliGRAM(s) Oral daily  pantoprazole    Tablet 40 milliGRAM(s) Oral two times a day before meals  insulin lispro (HumaLOG) corrective regimen sliding scale   SubCutaneous three times a day before meals  insulin lispro (HumaLOG) corrective regimen sliding scale   SubCutaneous at bedtime  dextrose 5%. 1000 milliLiter(s) (50 mL/Hr) IV Continuous <Continuous>  dextrose 50% Injectable 12.5 Gram(s) IV Push once  dextrose 50% Injectable 25 Gram(s) IV Push once  dextrose 50% Injectable 25 Gram(s) IV Push once  loperamide 2 milliGRAM(s) Oral two times a day  ondansetron    Tablet 8 milliGRAM(s) Oral two times a day    MEDICATIONS  (PRN):  simethicone 80 milliGRAM(s) Chew every 6 hours PRN Gas  acetaminophen   Tablet. 650 milliGRAM(s) Oral every 6 hours PRN mild, moderate pain  dextrose Gel 1 Dose(s) Oral once PRN Blood Glucose LESS THAN 70 milliGRAM(s)/deciliter  glucagon  Injectable 1 milliGRAM(s) IntraMuscular once PRN Glucose LESS THAN 70 milligrams/deciliter      Vital Signs Last 24 Hrs  T(C): 36.8 (19 Aug 2017 06:14), Max: 37.2 (19 Aug 2017 03:27)  T(F): 98.3 (19 Aug 2017 06:14), Max: 98.9 (19 Aug 2017 03:27)  HR: 133 (19 Aug 2017 06:14) (96 - 133)  BP: 94/41 (19 Aug 2017 06:14) (77/41 - 126/73)  BP(mean): --  RR: 18 (19 Aug 2017 06:14) (14 - 20)  SpO2: 96% (19 Aug 2017 06:14) (96% - 100%)  nc/at  s1s2  cta  soft, nt, nd no guarding or rebound  no c/c/e    CBC Full  -  ( 18 Aug 2017 17:45 )  WBC Count : 8.88 K/uL  Hemoglobin : 7.9 g/dL  Hematocrit : 25.8 %  Platelet Count - Automated : 305 K/uL  Mean Cell Volume : 66.7 fL  Mean Cell Hemoglobin : 20.4 pg  Mean Cell Hemoglobin Concentration : 30.6 %  Auto Neutrophil # : x  Auto Lymphocyte # : x  Auto Monocyte # : x  Auto Eosinophil # : x  Auto Basophil # : x  Auto Neutrophil % : x  Auto Lymphocyte % : x  Auto Monocyte % : x  Auto Eosinophil % : x  Auto Basophil % : x    08-18    138  |  104  |  101<H>  ----------------------------<  164<H>  4.6   |  19<L>  |  1.55<H>    Ca    8.9      18 Aug 2017 17:45  Phos  2.2     08-18  Mg     1.7     08-18    TPro  5.9<L>  /  Alb  3.2<L>  /  TBili  0.5  /  DBili  x   /  AST  15  /  ALT  11  /  AlkPhos  41  08-18    PT/INR - ( 18 Aug 2017 17:45 )   PT: 50.4 SEC;   INR: 4.37          PTT - ( 18 Aug 2017 17:45 )  PTT:35.4 SEC
LUCY LIM:[id number],   76yFemale followed for: abdominal pain anemia  clavulanate (Unknown)  erythromycin (Hives; Rash)    PAST MEDICAL & SURGICAL HISTORY:  Diabetes mellitus  Atrial fibrillation: on coumadin  CHF (congestive heart failure)  Hypertension  No significant past surgical history    FAMILY HISTORY:  No pertinent family history in first degree relatives    MEDICATIONS  (STANDING):  atorvastatin 40 milliGRAM(s) Oral at bedtime  ferrous    sulfate 325 milliGRAM(s) Oral daily  insulin lispro (HumaLOG) corrective regimen sliding scale   SubCutaneous three times a day before meals  insulin lispro (HumaLOG) corrective regimen sliding scale   SubCutaneous at bedtime  dextrose 5%. 1000 milliLiter(s) (50 mL/Hr) IV Continuous <Continuous>  dextrose 50% Injectable 12.5 Gram(s) IV Push once  dextrose 50% Injectable 25 Gram(s) IV Push once  dextrose 50% Injectable 25 Gram(s) IV Push once  loperamide 2 milliGRAM(s) Oral two times a day  ondansetron    Tablet 8 milliGRAM(s) Oral two times a day  pantoprazole Infusion 8 mG/Hr (10 mL/Hr) IV Continuous <Continuous>    MEDICATIONS  (PRN):  simethicone 80 milliGRAM(s) Chew every 6 hours PRN Gas  acetaminophen   Tablet. 650 milliGRAM(s) Oral every 6 hours PRN mild, moderate pain  dextrose Gel 1 Dose(s) Oral once PRN Blood Glucose LESS THAN 70 milliGRAM(s)/deciliter  glucagon  Injectable 1 milliGRAM(s) IntraMuscular once PRN Glucose LESS THAN 70 milligrams/deciliter      Vital Signs Last 24 Hrs  T(C): 37 (20 Aug 2017 03:47), Max: 37.2 (19 Aug 2017 23:15)  T(F): 98.6 (20 Aug 2017 03:47), Max: 99 (19 Aug 2017 23:15)  HR: 74 (20 Aug 2017 03:47) (60 - 129)  BP: 97/50 (20 Aug 2017 03:47) (81/47 - 107/55)  BP(mean): --  RR: 16 (20 Aug 2017 03:47) (16 - 18)  SpO2: 100% (20 Aug 2017 03:47) (96% - 100%)  nc/at  s1s2  cta  soft, nt, nd no guarding or rebound  no c/c/e    CBC Full  -  ( 20 Aug 2017 05:35 )  WBC Count : 11.07 K/uL  Hemoglobin : 8.5 g/dL  Hematocrit : 25.5 %  Platelet Count - Automated : 222 K/uL  Mean Cell Volume : 72.6 fL  Mean Cell Hemoglobin : 24.2 pg  Mean Cell Hemoglobin Concentration : 33.3 %  Auto Neutrophil # : x  Auto Lymphocyte # : x  Auto Monocyte # : x  Auto Eosinophil # : x  Auto Basophil # : x  Auto Neutrophil % : x  Auto Lymphocyte % : x  Auto Monocyte % : x  Auto Eosinophil % : x  Auto Basophil % : x    08-20    142  |  108<H>  |  70<H>  ----------------------------<  184<H>  4.4   |  20<L>  |  1.33<H>    Ca    8.8      20 Aug 2017 05:35  Phos  1.9     08-20  Mg     1.6     08-20    TPro  5.3<L>  /  Alb  2.8<L>  /  TBili  1.1  /  DBili  x   /  AST  16  /  ALT  11  /  AlkPhos  34<L>  08-20    PT/INR - ( 20 Aug 2017 05:35 )   PT: 50.7 SEC;   INR: 4.39          PTT - ( 19 Aug 2017 06:20 )  PTT:39.3 SEC
LUCY LIM:[id number],   76yFemale followed for: abdominal pain  clavulanate (Unknown)  erythromycin (Hives; Rash)    PAST MEDICAL & SURGICAL HISTORY:  Diabetes mellitus  Atrial fibrillation: on coumadin  CHF (congestive heart failure)  Hypertension  No significant past surgical history    FAMILY HISTORY:  No pertinent family history in first degree relatives    MEDICATIONS  (STANDING):  atorvastatin 40 milliGRAM(s) Oral at bedtime  ferrous    sulfate 325 milliGRAM(s) Oral daily  insulin lispro (HumaLOG) corrective regimen sliding scale   SubCutaneous three times a day before meals  insulin lispro (HumaLOG) corrective regimen sliding scale   SubCutaneous at bedtime  dextrose 5%. 1000 milliLiter(s) (50 mL/Hr) IV Continuous <Continuous>  dextrose 50% Injectable 12.5 Gram(s) IV Push once  dextrose 50% Injectable 25 Gram(s) IV Push once  dextrose 50% Injectable 25 Gram(s) IV Push once  loperamide 2 milliGRAM(s) Oral two times a day  ondansetron    Tablet 8 milliGRAM(s) Oral two times a day  furosemide    Tablet 40 milliGRAM(s) Oral two times a day  pantoprazole    Tablet 40 milliGRAM(s) Oral two times a day before meals    MEDICATIONS  (PRN):  simethicone 80 milliGRAM(s) Chew every 6 hours PRN Gas  acetaminophen   Tablet. 650 milliGRAM(s) Oral every 6 hours PRN mild, moderate pain  dextrose Gel 1 Dose(s) Oral once PRN Blood Glucose LESS THAN 70 milliGRAM(s)/deciliter  glucagon  Injectable 1 milliGRAM(s) IntraMuscular once PRN Glucose LESS THAN 70 milligrams/deciliter  sodium chloride 0.65% Nasal 1 Spray(s) Both Nostrils three times a day PRN Nasal Congestion      Vital Signs Last 24 Hrs  T(C): 36.9 (22 Aug 2017 04:00), Max: 36.9 (21 Aug 2017 19:21)  T(F): 98.4 (22 Aug 2017 04:00), Max: 98.5 (21 Aug 2017 19:21)  HR: 109 (22 Aug 2017 04:00) (67 - 140)  BP: 99/61 (22 Aug 2017 04:00) (88/60 - 144/94)  BP(mean): --  RR: 17 (22 Aug 2017 04:00) (16 - 18)  SpO2: 96% (22 Aug 2017 04:00) (95% - 100%)  nc/at  s1s2  cta  soft, nt, nd no guarding or rebound  no c/c/e    CBC Full  -  ( 21 Aug 2017 05:04 )  WBC Count : 10.84 K/uL  Hemoglobin : 10.0 g/dL  Hematocrit : 30.6 %  Platelet Count - Automated : 234 K/uL  Mean Cell Volume : 73.9 fL  Mean Cell Hemoglobin : 24.2 pg  Mean Cell Hemoglobin Concentration : 32.7 %  Auto Neutrophil # : x  Auto Lymphocyte # : x  Auto Monocyte # : x  Auto Eosinophil # : x  Auto Basophil # : x  Auto Neutrophil % : x  Auto Lymphocyte % : x  Auto Monocyte % : x  Auto Eosinophil % : x  Auto Basophil % : x    08-21    139  |  101  |  40<H>  ----------------------------<  155<H>  3.8   |  24  |  1.20    Ca    9.0      21 Aug 2017 05:04  Mg     1.3     08-21      PT/INR - ( 21 Aug 2017 05:04 )   PT: 39.3 SEC;   INR: 3.42
[id number],   76yFemale followed for:  clavulanate (Unknown)  erythromycin (Hives; Rash)    PAST MEDICAL & SURGICAL HISTORY:  Diabetes mellitus  Atrial fibrillation: on coumadin  CHF (congestive heart failure)  Hypertension  No significant past surgical history    FAMILY HISTORY:  No pertinent family history in first degree relatives    MEDICATIONS  (STANDING):  atorvastatin 40 milliGRAM(s) Oral at bedtime  ferrous    sulfate 325 milliGRAM(s) Oral daily  insulin lispro (HumaLOG) corrective regimen sliding scale   SubCutaneous three times a day before meals  insulin lispro (HumaLOG) corrective regimen sliding scale   SubCutaneous at bedtime  dextrose 5%. 1000 milliLiter(s) (50 mL/Hr) IV Continuous <Continuous>  dextrose 50% Injectable 12.5 Gram(s) IV Push once  dextrose 50% Injectable 25 Gram(s) IV Push once  dextrose 50% Injectable 25 Gram(s) IV Push once  ondansetron    Tablet 8 milliGRAM(s) Oral two times a day  furosemide    Tablet 40 milliGRAM(s) Oral two times a day  pantoprazole    Tablet 40 milliGRAM(s) Oral two times a day before meals  metoprolol 25 milliGRAM(s) Oral two times a day  heparin  Infusion 800 Unit(s)/Hr (9 mL/Hr) IV Continuous <Continuous>  docusate sodium 100 milliGRAM(s) Oral two times a day  senna 2 Tablet(s) Oral at bedtime  polyethylene glycol 3350 17 Gram(s) Oral daily  warfarin 4 milliGRAM(s) Oral once    MEDICATIONS  (PRN):  simethicone 80 milliGRAM(s) Chew every 6 hours PRN Gas  acetaminophen   Tablet. 650 milliGRAM(s) Oral every 6 hours PRN mild, moderate pain  dextrose Gel 1 Dose(s) Oral once PRN Blood Glucose LESS THAN 70 milliGRAM(s)/deciliter  glucagon  Injectable 1 milliGRAM(s) IntraMuscular once PRN Glucose LESS THAN 70 milligrams/deciliter  sodium chloride 0.65% Nasal 1 Spray(s) Both Nostrils three times a day PRN Nasal Congestion      Vital Signs Last 24 Hrs  T(C): 37.1 (24 Aug 2017 05:12), Max: 37.1 (23 Aug 2017 14:40)  T(F): 98.7 (24 Aug 2017 05:12), Max: 98.8 (23 Aug 2017 14:40)  HR: 100 (24 Aug 2017 05:12) (93 - 103)  BP: 124/69 (24 Aug 2017 05:12) (81/50 - 126/84)  BP(mean): --  RR: 18 (24 Aug 2017 05:12) (17 - 18)  SpO2: 99% (24 Aug 2017 05:12) (96% - 99%)  nc/at  s1s2  cta  soft, nt, nd no guarding or rebound  no c/c/e    CBC Full  -  ( 24 Aug 2017 07:05 )  WBC Count : 10.82 K/uL  Hemoglobin : 9.8 g/dL  Hematocrit : 30.6 %  Platelet Count - Automated : 256 K/uL  Mean Cell Volume : 75.4 fL  Mean Cell Hemoglobin : 24.1 pg  Mean Cell Hemoglobin Concentration : 32.0 %  Auto Neutrophil # : x  Auto Lymphocyte # : x  Auto Monocyte # : x  Auto Eosinophil # : x  Auto Basophil # : x  Auto Neutrophil % : x  Auto Lymphocyte % : x  Auto Monocyte % : x  Auto Eosinophil % : x  Auto Basophil % : x    08-24    135  |  96<L>  |  27<H>  ----------------------------<  141<H>  3.7   |  26  |  1.31<H>    Ca    8.7      24 Aug 2017 07:05  Mg     1.5     08-23      PT/INR - ( 24 Aug 2017 07:05 )   PT: 16.3 SEC;   INR: 1.44          PTT - ( 24 Aug 2017 07:05 )  PTT:60.1 SEC
[facility]UC[facility]ERRENovant HealthSANTOS:[id number],   76yFemale followed for: abdominal pain  clavulanate (Unknown)  erythromycin (Hives; Rash)    PAST MEDICAL & SURGICAL HISTORY:  Diabetes mellitus  Atrial fibrillation: on coumadin  CHF (congestive heart failure)  Hypertension  No significant past surgical history    FAMILY HISTORY:  No pertinent family history in first degree relatives    MEDICATIONS  (STANDING):  atorvastatin 40 milliGRAM(s) Oral at bedtime  ferrous    sulfate 325 milliGRAM(s) Oral daily  insulin lispro (HumaLOG) corrective regimen sliding scale   SubCutaneous three times a day before meals  insulin lispro (HumaLOG) corrective regimen sliding scale   SubCutaneous at bedtime  dextrose 5%. 1000 milliLiter(s) (50 mL/Hr) IV Continuous <Continuous>  dextrose 50% Injectable 12.5 Gram(s) IV Push once  dextrose 50% Injectable 25 Gram(s) IV Push once  dextrose 50% Injectable 25 Gram(s) IV Push once  ondansetron    Tablet 8 milliGRAM(s) Oral two times a day  pantoprazole    Tablet 40 milliGRAM(s) Oral two times a day before meals  metoprolol 25 milliGRAM(s) Oral two times a day  heparin  Infusion 800 Unit(s)/Hr (9 mL/Hr) IV Continuous <Continuous>  docusate sodium 100 milliGRAM(s) Oral two times a day  senna 2 Tablet(s) Oral at bedtime  polyethylene glycol 3350 17 Gram(s) Oral daily  furosemide    Tablet 40 milliGRAM(s) Oral daily    MEDICATIONS  (PRN):  simethicone 80 milliGRAM(s) Chew every 6 hours PRN Gas  acetaminophen   Tablet. 650 milliGRAM(s) Oral every 6 hours PRN mild, moderate pain  dextrose Gel 1 Dose(s) Oral once PRN Blood Glucose LESS THAN 70 milliGRAM(s)/deciliter  glucagon  Injectable 1 milliGRAM(s) IntraMuscular once PRN Glucose LESS THAN 70 milligrams/deciliter  sodium chloride 0.65% Nasal 1 Spray(s) Both Nostrils three times a day PRN Nasal Congestion      Vital Signs Last 24 Hrs  T(C): 37 (25 Aug 2017 05:10), Max: 37 (24 Aug 2017 21:30)  T(F): 98.6 (25 Aug 2017 05:10), Max: 98.6 (24 Aug 2017 21:30)  HR: 106 (25 Aug 2017 05:10) (67 - 106)  BP: 98/58 (25 Aug 2017 05:10) (98/58 - 119/97)  BP(mean): --  RR: 18 (25 Aug 2017 05:10) (18 - 18)  SpO2: 98% (25 Aug 2017 05:10) (94% - 98%)  nc/at  s1s2  cta  soft, nt, nd no guarding or rebound  no c/c/e    CBC Full  -  ( 24 Aug 2017 07:05 )  WBC Count : 10.82 K/uL  Hemoglobin : 9.8 g/dL  Hematocrit : 30.6 %  Platelet Count - Automated : 256 K/uL  Mean Cell Volume : 75.4 fL  Mean Cell Hemoglobin : 24.1 pg  Mean Cell Hemoglobin Concentration : 32.0 %  Auto Neutrophil # : x  Auto Lymphocyte # : x  Auto Monocyte # : x  Auto Eosinophil # : x  Auto Basophil # : x  Auto Neutrophil % : x  Auto Lymphocyte % : x  Auto Monocyte % : x  Auto Eosinophil % : x  Auto Basophil % : x    08-24    135  |  96<L>  |  27<H>  ----------------------------<  141<H>  3.7   |  26  |  1.31<H>    Ca    8.7      24 Aug 2017 07:05  Mg     1.5     08-23      PT/INR - ( 24 Aug 2017 07:05 )   PT: 16.3 SEC;   INR: 1.44          PTT - ( 24 Aug 2017 07:05 )  PTT:60.1 SEC
LUCY PIERREReplaced by [facility]sonSANTOS:[id number],   76yFemale followed for:  clavulanate (Unknown)  erythromycin (Hives; Rash)    PAST MEDICAL & SURGICAL HISTORY:  Diabetes mellitus  Atrial fibrillation: on coumadin  CHF (congestive heart failure)  Hypertension  No significant past surgical history    FAMILY HISTORY:  No pertinent family history in first degree relatives    MEDICATIONS  (STANDING):  atorvastatin 40 milliGRAM(s) Oral at bedtime  ferrous    sulfate 325 milliGRAM(s) Oral daily  insulin lispro (HumaLOG) corrective regimen sliding scale   SubCutaneous three times a day before meals  insulin lispro (HumaLOG) corrective regimen sliding scale   SubCutaneous at bedtime  dextrose 5%. 1000 milliLiter(s) (50 mL/Hr) IV Continuous <Continuous>  dextrose 50% Injectable 12.5 Gram(s) IV Push once  dextrose 50% Injectable 25 Gram(s) IV Push once  dextrose 50% Injectable 25 Gram(s) IV Push once  ondansetron    Tablet 8 milliGRAM(s) Oral two times a day  pantoprazole    Tablet 40 milliGRAM(s) Oral two times a day before meals  metoprolol 25 milliGRAM(s) Oral two times a day  docusate sodium 100 milliGRAM(s) Oral two times a day  senna 2 Tablet(s) Oral at bedtime  polyethylene glycol 3350 17 Gram(s) Oral daily  furosemide    Tablet 40 milliGRAM(s) Oral daily    MEDICATIONS  (PRN):  simethicone 80 milliGRAM(s) Chew every 6 hours PRN Gas  acetaminophen   Tablet. 650 milliGRAM(s) Oral every 6 hours PRN mild, moderate pain  dextrose Gel 1 Dose(s) Oral once PRN Blood Glucose LESS THAN 70 milliGRAM(s)/deciliter  glucagon  Injectable 1 milliGRAM(s) IntraMuscular once PRN Glucose LESS THAN 70 milligrams/deciliter  sodium chloride 0.65% Nasal 1 Spray(s) Both Nostrils three times a day PRN Nasal Congestion      Vital Signs Last 24 Hrs  T(C): 36.3 (28 Aug 2017 05:49), Max: 36.5 (27 Aug 2017 12:39)  T(F): 97.3 (28 Aug 2017 05:49), Max: 97.7 (27 Aug 2017 12:39)  HR: 90 (28 Aug 2017 05:49) (60 - 100)  BP: 100/68 (28 Aug 2017 05:49) (100/68 - 120/65)  BP(mean): --  RR: 18 (28 Aug 2017 05:49) (16 - 18)  SpO2: 99% (28 Aug 2017 05:49) (98% - 100%)  nc/at  s1s2  cta  soft, nt, nd no guarding or rebound  no c/c/e    CBC Full  -  ( 28 Aug 2017 05:30 )  WBC Count : 8.67 K/uL  Hemoglobin : 9.7 g/dL  Hematocrit : 31.4 %  Platelet Count - Automated : 369 K/uL  Mean Cell Volume : 76.2 fL  Mean Cell Hemoglobin : 23.5 pg  Mean Cell Hemoglobin Concentration : 30.9 %  Auto Neutrophil # : 4.71 K/uL  Auto Lymphocyte # : 3.03 K/uL  Auto Monocyte # : 0.77 K/uL  Auto Eosinophil # : 0.11 K/uL  Auto Basophil # : 0.03 K/uL  Auto Neutrophil % : 54.4 %  Auto Lymphocyte % : 34.9 %  Auto Monocyte % : 8.9 %  Auto Eosinophil % : 1.3 %  Auto Basophil % : 0.3 %    08-27    138  |  98  |  28<H>  ----------------------------<  113<H>  4.3   |  26  |  1.16    Ca    9.5      27 Aug 2017 06:00  Mg     1.7     08-27      PT/INR - ( 28 Aug 2017 05:30 )   PT: 31.8 SEC;   INR: 2.78
LUCY PIERREUNC Health ChathamSANTOS:[id number],   76yFemale followed for:  clavulanate (Unknown)  erythromycin (Hives; Rash)    PAST MEDICAL & SURGICAL HISTORY:  Diabetes mellitus  Atrial fibrillation: on coumadin  CHF (congestive heart failure)  Hypertension  No significant past surgical history    FAMILY HISTORY:  No pertinent family history in first degree relatives    MEDICATIONS  (STANDING):  atorvastatin 40 milliGRAM(s) Oral at bedtime  ferrous    sulfate 325 milliGRAM(s) Oral daily  insulin lispro (HumaLOG) corrective regimen sliding scale   SubCutaneous three times a day before meals  insulin lispro (HumaLOG) corrective regimen sliding scale   SubCutaneous at bedtime  dextrose 5%. 1000 milliLiter(s) (50 mL/Hr) IV Continuous <Continuous>  dextrose 50% Injectable 12.5 Gram(s) IV Push once  dextrose 50% Injectable 25 Gram(s) IV Push once  dextrose 50% Injectable 25 Gram(s) IV Push once  ondansetron    Tablet 8 milliGRAM(s) Oral two times a day  pantoprazole    Tablet 40 milliGRAM(s) Oral two times a day before meals  metoprolol 25 milliGRAM(s) Oral two times a day  docusate sodium 100 milliGRAM(s) Oral two times a day  senna 2 Tablet(s) Oral at bedtime  polyethylene glycol 3350 17 Gram(s) Oral daily  furosemide    Tablet 40 milliGRAM(s) Oral daily  predniSONE   Tablet 20 milliGRAM(s) Oral daily  warfarin 4 milliGRAM(s) Oral once    MEDICATIONS  (PRN):  simethicone 80 milliGRAM(s) Chew every 6 hours PRN Gas  acetaminophen   Tablet. 650 milliGRAM(s) Oral every 6 hours PRN mild, moderate pain  dextrose Gel 1 Dose(s) Oral once PRN Blood Glucose LESS THAN 70 milliGRAM(s)/deciliter  glucagon  Injectable 1 milliGRAM(s) IntraMuscular once PRN Glucose LESS THAN 70 milligrams/deciliter  sodium chloride 0.65% Nasal 1 Spray(s) Both Nostrils three times a day PRN Nasal Congestion      Vital Signs Last 24 Hrs  T(C): 36.5 (26 Aug 2017 05:13), Max: 37.2 (25 Aug 2017 15:02)  T(F): 97.7 (26 Aug 2017 05:13), Max: 98.9 (25 Aug 2017 15:02)  HR: 70 (26 Aug 2017 05:13) (54 - 83)  BP: 102/66 (26 Aug 2017 05:13) (93/71 - 105/61)  BP(mean): --  RR: 17 (26 Aug 2017 05:13) (17 - 18)  SpO2: 99% (26 Aug 2017 05:13) (98% - 100%)  nc/at  s1s2  cta  soft, nt, nd no guarding or rebound  no c/c/e    CBC Full  -  ( 26 Aug 2017 06:00 )  WBC Count : 7.38 K/uL  Hemoglobin : 8.7 g/dL  Hematocrit : 26.9 %  Platelet Count - Automated : 287 K/uL  Mean Cell Volume : 74.7 fL  Mean Cell Hemoglobin : 24.2 pg  Mean Cell Hemoglobin Concentration : 32.3 %  Auto Neutrophil # : x  Auto Lymphocyte # : x  Auto Monocyte # : x  Auto Eosinophil # : x  Auto Basophil # : x  Auto Neutrophil % : x  Auto Lymphocyte % : x  Auto Monocyte % : x  Auto Eosinophil % : x  Auto Basophil % : x    08-26    136  |  99  |  25<H>  ----------------------------<  170<H>  4.8   |  24  |  1.09    Ca    9.3      26 Aug 2017 06:00  Mg     1.6     08-25      PT/INR - ( 26 Aug 2017 06:00 )   PT: 24.0 SEC;   INR: 2.11          PTT - ( 25 Aug 2017 07:00 )  PTT:58.8 SEC
LUCY PIERREUNC Health PardeeSANTOS:[id number],   76yFemale followed for:  clavulanate (Unknown)  erythromycin (Hives; Rash)    PAST MEDICAL & SURGICAL HISTORY:  Diabetes mellitus  Atrial fibrillation: on coumadin  CHF (congestive heart failure)  Hypertension  No significant past surgical history    FAMILY HISTORY:  No pertinent family history in first degree relatives    MEDICATIONS  (STANDING):  atorvastatin 40 milliGRAM(s) Oral at bedtime  ferrous    sulfate 325 milliGRAM(s) Oral daily  insulin lispro (HumaLOG) corrective regimen sliding scale   SubCutaneous three times a day before meals  insulin lispro (HumaLOG) corrective regimen sliding scale   SubCutaneous at bedtime  dextrose 5%. 1000 milliLiter(s) (50 mL/Hr) IV Continuous <Continuous>  dextrose 50% Injectable 12.5 Gram(s) IV Push once  dextrose 50% Injectable 25 Gram(s) IV Push once  dextrose 50% Injectable 25 Gram(s) IV Push once  ondansetron    Tablet 8 milliGRAM(s) Oral two times a day  pantoprazole    Tablet 40 milliGRAM(s) Oral two times a day before meals  metoprolol 25 milliGRAM(s) Oral two times a day  docusate sodium 100 milliGRAM(s) Oral two times a day  senna 2 Tablet(s) Oral at bedtime  polyethylene glycol 3350 17 Gram(s) Oral daily  furosemide    Tablet 40 milliGRAM(s) Oral daily  predniSONE   Tablet 20 milliGRAM(s) Oral daily    MEDICATIONS  (PRN):  simethicone 80 milliGRAM(s) Chew every 6 hours PRN Gas  acetaminophen   Tablet. 650 milliGRAM(s) Oral every 6 hours PRN mild, moderate pain  dextrose Gel 1 Dose(s) Oral once PRN Blood Glucose LESS THAN 70 milliGRAM(s)/deciliter  glucagon  Injectable 1 milliGRAM(s) IntraMuscular once PRN Glucose LESS THAN 70 milligrams/deciliter  sodium chloride 0.65% Nasal 1 Spray(s) Both Nostrils three times a day PRN Nasal Congestion      Vital Signs Last 24 Hrs  T(C): 36.6 (27 Aug 2017 05:42), Max: 36.8 (26 Aug 2017 23:07)  T(F): 97.9 (27 Aug 2017 05:42), Max: 98.2 (26 Aug 2017 23:07)  HR: 73 (27 Aug 2017 09:50) (73 - 89)  BP: 101/63 (27 Aug 2017 05:42) (101/63 - 106/65)  BP(mean): --  RR: 18 (27 Aug 2017 05:42) (17 - 18)  SpO2: 99% (27 Aug 2017 05:42) (96% - 99%)  nc/at  s1s2  cta  soft, nt, nd no guarding or rebound  no c/c/e    CBC Full  -  ( 27 Aug 2017 06:00 )  WBC Count : 7.54 K/uL  Hemoglobin : 9.1 g/dL  Hematocrit : 28.4 %  Platelet Count - Automated : 333 K/uL  Mean Cell Volume : 74.7 fL  Mean Cell Hemoglobin : 23.9 pg  Mean Cell Hemoglobin Concentration : 32.0 %  Auto Neutrophil # : 4.38 K/uL  Auto Lymphocyte # : 2.42 K/uL  Auto Monocyte # : 0.61 K/uL  Auto Eosinophil # : 0.09 K/uL  Auto Basophil # : 0.02 K/uL  Auto Neutrophil % : 58.0 %  Auto Lymphocyte % : 32.1 %  Auto Monocyte % : 8.1 %  Auto Eosinophil % : 1.2 %  Auto Basophil % : 0.3 %    08-27    138  |  98  |  28<H>  ----------------------------<  113<H>  4.3   |  26  |  1.16    Ca    9.5      27 Aug 2017 06:00  Mg     1.7     08-27      PT/INR - ( 27 Aug 2017 06:00 )   PT: 29.1 SEC;   INR: 2.55
Patient is a 76y old  Female who presents with a chief complaint of Abdominal Pain (18 Aug 2017 14:43)      HPI:  75 y/o female, with a PmHx of Afib on coumadin, DM, CHF, presented to [facility] ED c/o nausea/vomiting and abdominal pain. Pt states for the past 2 days she has been having diffuse abdominal pain but is worse in the lower left quadrant of her abdomen and is associated with nausea/vomiting and watery diarrhea. Pt states she has had no energy and has had a poor appetite. She denies any fever, chills, chest pain, HA, dizziness, blurred vision. She was recently discharged from [facility] 1 week ago after being admitted to telemetry for CHF exacerbation and chest pain. At that time she had an TTE, JULIANO and a cardiac cath that was done that showed normal coronaries. Currently, she states the abdominal pain has subsided. In the ED today, she was found to be hypotensive to 73/53 and occult positive. She is being admitted to telemetry for further management. (18 Aug 2017 14:43)      PAST MEDICAL & SURGICAL HISTORY:  Diabetes mellitus  Atrial fibrillation: on coumadin  CHF (congestive heart failure)  Hypertension  No significant past surgical history      MEDICATIONS  (STANDING):  atorvastatin 40 milliGRAM(s) Oral at bedtime  ferrous    sulfate 325 milliGRAM(s) Oral daily  pantoprazole    Tablet 40 milliGRAM(s) Oral two times a day before meals  insulin lispro (HumaLOG) corrective regimen sliding scale   SubCutaneous three times a day before meals  insulin lispro (HumaLOG) corrective regimen sliding scale   SubCutaneous at bedtime  dextrose 5%. 1000 milliLiter(s) (50 mL/Hr) IV Continuous <Continuous>  dextrose 50% Injectable 12.5 Gram(s) IV Push once  dextrose 50% Injectable 25 Gram(s) IV Push once  dextrose 50% Injectable 25 Gram(s) IV Push once      Allergies    clavulanate (Unknown)  erythromycin (Hives; Rash)    Intolerances        SOCIAL HISTORY:  Denies ETOh,Smoking,     FAMILY HISTORY:  No pertinent family history in first degree relatives      REVIEW OF SYSTEMS:    CONSTITUTIONAL: No weakness, fevers or chills  EYES/ENT: No visual changes;  No vertigo or throat pain   NECK: No pain or stiffness  RESPIRATORY: No cough, wheezing, hemoptysis; No shortness of breath  CARDIOVASCULAR: No chest pain or palpitations  GASTROINTESTINAL: No abdominal or epigastric pain. No nausea, vomiting, or hematemesis; No diarrhea or constipation. No melena or hematochezia.  GENITOURINARY: No dysuria, frequency or hematuria  NEUROLOGICAL: No numbness or weakness  SKIN: No itching, burning, rashes, or lesions   All other review of systems is negative unless indicated above.    VITAL:  T(C): , Max: 37 (08-18-17 @ 13:31)  T(F): , Max: 98.6 (08-18-17 @ 13:31)  HR: 114 (08-18-17 @ 15:12)  BP: 96/40 (08-18-17 @ 15:12)  BP(mean): --  RR: 14 (08-18-17 @ 15:12)  SpO2: 100% (08-18-17 @ 15:12)  Wt(kg): --    I and O's:    08-17 @ 07:01  -  08-18 @ 07:00  --------------------------------------------------------  IN: 1250 mL / OUT: 0 mL / NET: 1250 mL      Height (cm): 162.56 (08-18 @ 14:43)  Weight (kg): 108.9 (08-18 @ 14:43)  BMI (kg/m2): 41.2 (08-18 @ 14:43)  BSA (m2): 2.11 (08-18 @ 14:43)    PHYSICAL EXAM:    Constitutional: NAD  HEENT: PERRLA,   Neck: No JVD  Respiratory: CTA B/L  Cardiovascular: S1 and S2  Gastrointestinal: BS+, soft, NT/ND  Extremities: No peripheral edema  Neurological: A/O x 3, no focal deficits  Psychiatric: Normal mood, normal affect  : No Saunders  Skin: No rashes  Access: Not applicable  Back: No CVA tenderness    LABS:                        11.6   8.90  )-----------( 364      ( 17 Aug 2017 20:40 )             37.9     08-17    133<L>  |  94<L>  |  108<H>  ----------------------------<  81  5.0   |  20<L>  |  2.27<H>    Ca    10.1      17 Aug 2017 23:41    TPro  8.0  /  Alb  3.9  /  TBili  0.7  /  DBili  x   /  AST  70<H>  /  ALT  18  /  AlkPhos  45  08-17          RADIOLOGY & ADDITIONAL STUDIES:
Patient seen and examined at bedside. No chest pain/sob. Pt reports occasional productive cough with small white sputum    VITALS:  T(F): 98.7 (08-20-17 @ 14:29), Max: 99 (08-19-17 @ 23:15)  HR: 63 (08-20-17 @ 14:29)  BP: 104/60 (08-20-17 @ 14:29)  RR: 16 (08-20-17 @ 14:29)  SpO2: 92% (08-20-17 @ 14:29)  Wt(kg): --    08-19 @ 07:01  -  08-20 @ 07:00  --------------------------------------------------------  IN: 1460 mL / OUT: 0 mL / NET: 1460 mL          PHYSICAL EXAM:  Constitutional: NAD  Neck: No JVD  Respiratory: CTAB, no wheezes, rales or rhonchi  Cardiovascular: (+) murmur  Gastrointestinal: BS+, soft, NT/ND  Extremities: No peripheral edema    Hospital Medications:   MEDICATIONS  (STANDING):  atorvastatin 40 milliGRAM(s) Oral at bedtime  ferrous    sulfate 325 milliGRAM(s) Oral daily  insulin lispro (HumaLOG) corrective regimen sliding scale   SubCutaneous three times a day before meals  insulin lispro (HumaLOG) corrective regimen sliding scale   SubCutaneous at bedtime  dextrose 5%. 1000 milliLiter(s) (50 mL/Hr) IV Continuous <Continuous>  dextrose 50% Injectable 12.5 Gram(s) IV Push once  dextrose 50% Injectable 25 Gram(s) IV Push once  dextrose 50% Injectable 25 Gram(s) IV Push once  loperamide 2 milliGRAM(s) Oral two times a day  ondansetron    Tablet 8 milliGRAM(s) Oral two times a day  furosemide    Tablet 40 milliGRAM(s) Oral two times a day      LABS:  08-20    142  |  108<H>  |  70<H>  ----------------------------<  184<H>  4.4   |  20<L>  |  1.33<H>    Ca    8.8      20 Aug 2017 05:35  Phos  1.9     08-20  Mg     1.6     08-20    TPro  5.3<L>  /  Alb  2.8<L>  /  TBili  1.1  /  DBili      /  AST  16  /  ALT  11  /  AlkPhos  34<L>  08-20    Creatinine Trend: 1.33 <--, 1.48 <--, 1.55 <--, 2.27 <--, 2.59 <--  Phosphorus Level, Serum: 1.9 mg/dL (08-20 @ 05:35)  Albumin, Serum: 2.8 g/dL (08-20 @ 05:35)                              8.5    11.07 )-----------( 222      ( 20 Aug 2017 05:35 )             25.5     Urine Studies:      Urinalysis - [08-06-17 @ 07:46]      Color YELLOW / Appearance CLEAR / SG 1.011 / pH 6.0      Gluc NEGATIVE / Ketone NEGATIVE  / Bili NEGATIVE / Urobili NORMAL       Blood NEGATIVE / Protein 20 / Leuk Est NEGATIVE / Nitrite NEGATIVE      RBC 0-2 / WBC 2-5 / Hyaline  / Gran  / Sq Epi  / Non Sq Epi  / Bacteria       Iron 35, TIBC 358, %sat 10      [07-31-17 @ 06:37]  Ferritin 56.0      [07-31-17 @ 10:21]  Vitamin D (25OH) 17.3      [07-31-17 @ 10:21]  HbA1c 6.4      [07-31-17 @ 10:21]  TSH 1.10      [08-04-17 @ 03:50]  Lipid: chol 107, TG 60, HDL 40, LDL 59      [08-04-17 @ 03:50]        RADIOLOGY & ADDITIONAL STUDIES:
SUBJ:75 y/o female, with a PmHx of Afib on coumadin, DM, Chronic systolic HF, presented to [facility] ED c/o nausea/vomiting and abdominal pain.Feels better tolerating orals,no dyspnea chest pain remains on Heprin gtt.      MEDICATIONS  (STANDING):  atorvastatin 40 milliGRAM(s) Oral at bedtime  ferrous    sulfate 325 milliGRAM(s) Oral daily  insulin lispro (HumaLOG) corrective regimen sliding scale   SubCutaneous three times a day before meals  insulin lispro (HumaLOG) corrective regimen sliding scale   SubCutaneous at bedtime  loperamide 2 milliGRAM(s) Oral two times a day  ondansetron    Tablet 8 milliGRAM(s) Oral two times a day  furosemide    Tablet 40 milliGRAM(s) Oral two times a day  pantoprazole    Tablet 40 milliGRAM(s) Oral two times a day before meals  metoprolol 25 milliGRAM(s) Oral two times a day  heparin  Infusion 800 Unit(s)/Hr (12 mL/Hr) IV Continuous <Continuous>  magnesium sulfate  IVPB 2 Gram(s) IV Intermittent once  potassium chloride    Tablet ER 20 milliEquivalent(s) Oral once    MEDICATIONS  (PRN):  simethicone 80 milliGRAM(s) Chew every 6 hours PRN Gas  acetaminophen   Tablet. 650 milliGRAM(s) Oral every 6 hours PRN mild, moderate pain  dextrose Gel 1 Dose(s) Oral once PRN Blood Glucose LESS THAN 70 milliGRAM(s)/deciliter  glucagon  Injectable 1 milliGRAM(s) IntraMuscular once PRN Glucose LESS THAN 70 milligrams/deciliter  sodium chloride 0.65% Nasal 1 Spray(s) Both Nostrils three times a day PRN Nasal Congestion            Vital Signs Last 24 Hrs  T(C): 36.9 (23 Aug 2017 09:15), Max: 37.1 (22 Aug 2017 17:09)  T(F): 98.4 (23 Aug 2017 09:15), Max: 98.7 (22 Aug 2017 17:09)  HR: 89 (23 Aug 2017 09:15) (60 - 105)  BP: 117/57 (23 Aug 2017 09:15) (60/- - 129/53)  RR: 18 (23 Aug 2017 09:15) (18 - 18)  SpO2: 93% (23 Aug 2017 09:15) (93% - 100%)      PHYSICAL EXAM:  CONSTITUTIONAL:BMI- 28 .  EYES:Anicteric [x ]EOMI.  ENMT:No oral lesions.  NECK:[ ]Bruits [ ]Thyroid [ ]JVD.  RESPIRATORY:Equal air entry bilaterally,[x ]Clear to auscultation[ ]Rales[ ]Rhonchi[ ]Wheeze.  CARDIOVASCULAR:Irregular rhythm[ ]Rub[x]Murmur-3/6SEM             [ ]Normal PMI.  GASTROINTESTINAL:[x ]BS[ ]Tenderness[ ]Reboumd[ ]Ridgitity[ ]Organomegaly.  EXTREMITIES:[ ]Clubbing[ ]Cyanosis[x ]Edema.  VASCULAR:[x ]Pulses intact and symmetrical.  NEUROLOGICAL:Alert &OrientetedX 3[x ]Normal strength[x]NoFocal deficit.  SKIN:[ ]Lesions[ ]Rash.  MUSCULOSKEKETAL:[ ]Calf tenderness[ ]Joint abnormalities.    ECG:Atrial fibrillation ST & T wave abnormality, consider inferolateral ischemia or digitalis effect    JULIANO:Study Date: 8/4/2017 Severe global left ventricular systolic dysfunction.Calcified trileaflet aortic valve consistent with moderate aortic stenosis. with mild pulmonary hypertension.        LABS:                        9.0    11.74 )-----------( 240      ( 23 Aug 2017 07:35 )             27.6     08-23    136  |  96<L>  |  29<H>  ----------------------------<  152<H>  3.7   |  29  |  1.38<H>    Ca    8.7      23 Aug 2017 07:35  Phos  2.9     08-22  Mg     1.5     08-23          PT/INR - ( 22 Aug 2017 10:19 )   PT: 20.5 SEC;   INR: 1.81          PTT - ( 23 Aug 2017 00:50 )  PTT:34.2 SEC    I&O's Summary    22 Aug 2017 07:01  -  23 Aug 2017 07:00  --------------------------------------------------------  IN: 840 mL / OUT: 0 mL / NET: 840 mL    23 Aug 2017 07:01  -  23 Aug 2017 10:11  --------------------------------------------------------  IN: 160 mL / OUT: 0 mL / NET: 160 mL      IMPRESSION AND PLAN:  75 y/o female, with a PmHx of Afib on coumadin, DM, Chronic systolic HF, is being admitted to telemetry for hypotension, shelby and abd pain.    Chronic Systolic [id number] held 2/2 SHELBY to resume once creatinine normal.  Permanemt Atrial Fibrillation CHADS2-4 on Heparin gtt resume coumadin as per GI
chief complaint : nausea      SUBJECTIVE / OVERNIGHT EVENTS: pt denies chest pain, shortness of breath, abd pain, nausea, v says she feels better today       MEDICATIONS  (STANDING):  atorvastatin 40 milliGRAM(s) Oral at bedtime  ferrous    sulfate 325 milliGRAM(s) Oral daily  insulin lispro (HumaLOG) corrective regimen sliding scale   SubCutaneous three times a day before meals  insulin lispro (HumaLOG) corrective regimen sliding scale   SubCutaneous at bedtime  dextrose 5%. 1000 milliLiter(s) (50 mL/Hr) IV Continuous <Continuous>  dextrose 50% Injectable 12.5 Gram(s) IV Push once  dextrose 50% Injectable 25 Gram(s) IV Push once  dextrose 50% Injectable 25 Gram(s) IV Push once  loperamide 2 milliGRAM(s) Oral two times a day  ondansetron    Tablet 8 milliGRAM(s) Oral two times a day  furosemide   Injectable 20 milliGRAM(s) IV Push once  pantoprazole Infusion 8 mG/Hr (10 mL/Hr) IV Continuous <Continuous>    MEDICATIONS  (PRN):  simethicone 80 milliGRAM(s) Chew every 6 hours PRN Gas  acetaminophen   Tablet. 650 milliGRAM(s) Oral every 6 hours PRN mild, moderate pain  dextrose Gel 1 Dose(s) Oral once PRN Blood Glucose LESS THAN 70 milliGRAM(s)/deciliter  glucagon  Injectable 1 milliGRAM(s) IntraMuscular once PRN Glucose LESS THAN 70 milligrams/deciliter        CAPILLARY BLOOD GLUCOSE  310 (19 Aug 2017 12:30)  230 (19 Aug 2017 08:51)  203 (18 Aug 2017 22:02)  137 (18 Aug 2017 15:12)        I&O's Summary    18 Aug 2017 07:01  -  19 Aug 2017 07:00  --------------------------------------------------------  IN: 200 mL / OUT: 0 mL / NET: 200 mL        Constitutional: No fever, fatigue  Skin: No rash.  Eyes: No recent vision problems or eye pain.  ENT: No congestion, ear pain, or sore throat.  Cardiovascular: No chest pain or palpation.  Respiratory: No cough, shortness of breath, congestion, or wheezing.  Gastrointestinal: No abdominal pain, nausea, vomiting, or diarrhea.  Genitourinary: No dysuria.  Musculoskeletal: No joint swelling.  Neurologic: No headache.    PHYSICAL EXAM:  GENERAL: NAD  EYES: EOMI, PERRLA  NECK: Supple, No JVD  CHEST/LUNG: dec breath sounds at bases   HEART:  S1 , S2 +  ABDOMEN: soft, bs+  EXTREMITIES:  trace edema   NEUROLOGY:alert awake calm cooperative       LABS:                        6.7    9.02  )-----------( 282      ( 19 Aug 2017 06:20 )             22.1     08-19    144  |  110<H>  |  102<H>  ----------------------------<  198<H>  5.0   |  19<L>  |  1.48<H>    Ca    8.8      19 Aug 2017 06:20  Phos  2.2     08-18  Mg     1.7     08-18    TPro  5.9<L>  /  Alb  3.2<L>  /  TBili  0.5  /  DBili  x   /  AST  15  /  ALT  11  /  AlkPhos  41  08-18    PT/INR - ( 19 Aug 2017 06:20 )   PT: 59.9 SEC;   INR: 5.17          PTT - ( 19 Aug 2017 06:20 )  PTT:39.3 SEC  CARDIAC MARKERS ( 18 Aug 2017 17:45 )  x     / < 0.06 ng/mL / 37 u/L / 2.03 ng/mL / x      CARDIAC MARKERS ( 17 Aug 2017 20:40 )  x     / < 0.06 ng/mL / x     / x     / x              RADIOLOGY & ADDITIONAL TESTS:    Imaging Personally Reviewed:    Consultant(s) Notes Reviewed:      Care Discussed with Consultants/Other Providers:
chief complaint : nausea      SUBJECTIVE / OVERNIGHT EVENTS: pt denies chest pain, shortness of breath, abd pain, nausea, v says she feels better today       MEDICATIONS  (STANDING):  atorvastatin 40 milliGRAM(s) Oral at bedtime  ferrous    sulfate 325 milliGRAM(s) Oral daily  insulin lispro (HumaLOG) corrective regimen sliding scale   SubCutaneous three times a day before meals  insulin lispro (HumaLOG) corrective regimen sliding scale   SubCutaneous at bedtime  dextrose 5%. 1000 milliLiter(s) (50 mL/Hr) IV Continuous <Continuous>  dextrose 50% Injectable 12.5 Gram(s) IV Push once  dextrose 50% Injectable 25 Gram(s) IV Push once  dextrose 50% Injectable 25 Gram(s) IV Push once  ondansetron    Tablet 8 milliGRAM(s) Oral two times a day  furosemide    Tablet 40 milliGRAM(s) Oral two times a day  pantoprazole    Tablet 40 milliGRAM(s) Oral two times a day before meals  metoprolol 25 milliGRAM(s) Oral two times a day  heparin  Infusion 800 Unit(s)/Hr (12 mL/Hr) IV Continuous <Continuous>  docusate sodium 100 milliGRAM(s) Oral two times a day  senna 2 Tablet(s) Oral at bedtime  polyethylene glycol 3350 17 Gram(s) Oral daily    MEDICATIONS  (PRN):  simethicone 80 milliGRAM(s) Chew every 6 hours PRN Gas  acetaminophen   Tablet. 650 milliGRAM(s) Oral every 6 hours PRN mild, moderate pain  dextrose Gel 1 Dose(s) Oral once PRN Blood Glucose LESS THAN 70 milliGRAM(s)/deciliter  glucagon  Injectable 1 milliGRAM(s) IntraMuscular once PRN Glucose LESS THAN 70 milligrams/deciliter  sodium chloride 0.65% Nasal 1 Spray(s) Both Nostrils three times a day PRN Nasal Congestion    Vital Signs Last 24 Hrs  T(C): 36.9 (23 Aug 2017 18:24), Max: 37.1 (23 Aug 2017 14:40)  T(F): 98.5 (23 Aug 2017 18:24), Max: 98.8 (23 Aug 2017 14:40)  HR: 93 (23 Aug 2017 18:24) (65 - 103)  BP: 108/72 (23 Aug 2017 18:24) (60/- - 117/57)  BP(mean): --  RR: 17 (23 Aug 2017 18:24) (17 - 18)  SpO2: 97% (23 Aug 2017 18:24) (93% - 100%)      Constitutional: No fever, fatigue  Skin: No rash.  Eyes: No recent vision problems or eye pain.  ENT: No congestion, ear pain, or sore throat.  Cardiovascular: No chest pain or palpation.  Respiratory: No cough, shortness of breath, congestion, or wheezing.  Gastrointestinal: No abdominal pain, nausea, vomiting, or diarrhea.  Genitourinary: No dysuria.  Musculoskeletal: No joint swelling.  Neurologic: No headache.    PHYSICAL EXAM:  GENERAL: NAD  EYES: EOMI, PERRLA  NECK: Supple, No JVD  CHEST/LUNG: dec breath sounds at bases   HEART:  S1 , S2 +  ABDOMEN: soft, bs+  EXTREMITIES:  trace edema   NEUROLOGY:alert awake calm cooperative     LABS:      136  |  96<L>  |  29<H>  ----------------------------<  152<H>  3.7   |  29  |  1.38<H>    Ca    8.7      23 Aug 2017 07:35  Phos  2.9     08-22  Mg     1.5           Creatinine Trend: 1.38 <--, 1.20 <--, 1.20 <--, 1.33 <--, 1.48 <--, 1.55 <--, 2.27 <--, 2.59 <--                        9.0    11.74 )-----------( 240      ( 23 Aug 2017 07:35 )             27.6     Urine Studies:  Urinalysis Basic - ( 23 Aug 2017 07:50 )    Color: YELLOW / Appearance: CLEAR / S.013 / pH: 6.0  Gluc: NEGATIVE / Ketone: NEGATIVE  / Bili: NEGATIVE / Urobili: NORMAL E.U.   Blood: NEGATIVE / Protein: NEGATIVE / Nitrite: NEGATIVE   Leuk Esterase: NEGATIVE / RBC: 0-2 / WBC 0-2   Sq Epi: OCC / Non Sq Epi:  / Bacteria: FEW                PT/INR - ( 23 Aug 2017 11:47 )   PT: 19.2 SEC;   INR: 1.70          PTT - ( 23 Aug 2017 11:47 )  PTT:72.3 SEC
chief complaint : nausea      SUBJECTIVE / OVERNIGHT EVENTS: pt denies chest pain, shortness of breath, abd pain, nausea, v says she feels better today       MEDICATIONS  (STANDING):  atorvastatin 40 milliGRAM(s) Oral at bedtime  ferrous    sulfate 325 milliGRAM(s) Oral daily  insulin lispro (HumaLOG) corrective regimen sliding scale   SubCutaneous three times a day before meals  insulin lispro (HumaLOG) corrective regimen sliding scale   SubCutaneous at bedtime  dextrose 5%. 1000 milliLiter(s) (50 mL/Hr) IV Continuous <Continuous>  dextrose 50% Injectable 12.5 Gram(s) IV Push once  dextrose 50% Injectable 25 Gram(s) IV Push once  dextrose 50% Injectable 25 Gram(s) IV Push once  ondansetron    Tablet 8 milliGRAM(s) Oral two times a day  pantoprazole    Tablet 40 milliGRAM(s) Oral two times a day before meals  metoprolol 25 milliGRAM(s) Oral two times a day  docusate sodium 100 milliGRAM(s) Oral two times a day  senna 2 Tablet(s) Oral at bedtime  polyethylene glycol 3350 17 Gram(s) Oral daily  furosemide    Tablet 40 milliGRAM(s) Oral daily  predniSONE   Tablet 20 milliGRAM(s) Oral daily    MEDICATIONS  (PRN):  simethicone 80 milliGRAM(s) Chew every 6 hours PRN Gas  acetaminophen   Tablet. 650 milliGRAM(s) Oral every 6 hours PRN mild, moderate pain  dextrose Gel 1 Dose(s) Oral once PRN Blood Glucose LESS THAN 70 milliGRAM(s)/deciliter  glucagon  Injectable 1 milliGRAM(s) IntraMuscular once PRN Glucose LESS THAN 70 milligrams/deciliter  sodium chloride 0.65% Nasal 1 Spray(s) Both Nostrils three times a day PRN Nasal Congestion      Vital Signs Last 24 Hrs  T(C): 36.6 (26 Aug 2017 13:25), Max: 36.6 (26 Aug 2017 13:25)  T(F): 97.9 (26 Aug 2017 13:25), Max: 97.9 (26 Aug 2017 13:25)  HR: 84 (26 Aug 2017 17:14) (70 - 87)  BP: 103/68 (26 Aug 2017 17:14) (102/66 - 106/65)  BP(mean): --  RR: 17 (26 Aug 2017 13:25) (17 - 17)  SpO2: 96% (26 Aug 2017 13:25) (96% - 99%)  Constitutional: No fever, fatigue  Skin: No rash.  Eyes: No recent vision problems or eye pain.  ENT: No congestion, ear pain, or sore throat.  Cardiovascular: No chest pain or palpation.  Respiratory: No cough, shortness of breath, congestion, or wheezing.  Gastrointestinal: No abdominal pain, nausea, vomiting, or diarrhea.  Genitourinary: No dysuria.  Musculoskeletal: No joint swelling.  Neurologic: No headache.    PHYSICAL EXAM:  GENERAL: NAD  EYES: EOMI, PERRLA  NECK: Supple, No JVD  CHEST/LUNG: dec breath sounds at bases   HEART:  S1 , S2 +  ABDOMEN: soft, bs+  EXTREMITIES:  trace edema   NEUROLOGY:alert awake calm cooperative     LABS:      136  |  99  |  25<H>  ----------------------------<  170<H>  4.8   |  24  |  1.09    Ca    9.3      26 Aug 2017 06:00  Mg     1.6           Creatinine Trend: 1.09 <--, 1.26 <--, 1.31 <--, 1.38 <--, 1.20 <--, 1.20 <--, 1.33 <--                        8.7    7.38  )-----------( 287      ( 26 Aug 2017 06:00 )             26.9     Urine Studies:  Urinalysis Basic - ( 23 Aug 2017 07:50 )    Color: YELLOW / Appearance: CLEAR / S.013 / pH: 6.0  Gluc: NEGATIVE / Ketone: NEGATIVE  / Bili: NEGATIVE / Urobili: NORMAL E.U.   Blood: NEGATIVE / Protein: NEGATIVE / Nitrite: NEGATIVE   Leuk Esterase: NEGATIVE / RBC: 0-2 / WBC 0-2   Sq Epi: OCC / Non Sq Epi:  / Bacteria: FEW                PT/INR - ( 26 Aug 2017 06:00 )   PT: 24.0 SEC;   INR: 2.11          PTT - ( 25 Aug 2017 07:00 )  PTT:58.8 SEC                PT/INR - ( 25 Aug 2017 07:00 )   PT: 18.9 SEC;   INR: 1.67          PTT - ( 25 Aug 2017 07:00 )  PTT:58.8 SEC
chief complaint : nausea      SUBJECTIVE / OVERNIGHT EVENTS: pt denies chest pain, shortness of breath, abd pain, nausea, v says she feels better today       MEDICATIONS  (STANDING):  atorvastatin 40 milliGRAM(s) Oral at bedtime  ferrous    sulfate 325 milliGRAM(s) Oral daily  insulin lispro (HumaLOG) corrective regimen sliding scale   SubCutaneous three times a day before meals  insulin lispro (HumaLOG) corrective regimen sliding scale   SubCutaneous at bedtime  dextrose 5%. 1000 milliLiter(s) (50 mL/Hr) IV Continuous <Continuous>  dextrose 50% Injectable 12.5 Gram(s) IV Push once  dextrose 50% Injectable 25 Gram(s) IV Push once  dextrose 50% Injectable 25 Gram(s) IV Push once  ondansetron    Tablet 8 milliGRAM(s) Oral two times a day  pantoprazole    Tablet 40 milliGRAM(s) Oral two times a day before meals  metoprolol 25 milliGRAM(s) Oral two times a day  heparin  Infusion 800 Unit(s)/Hr (9 mL/Hr) IV Continuous <Continuous>  docusate sodium 100 milliGRAM(s) Oral two times a day  senna 2 Tablet(s) Oral at bedtime  polyethylene glycol 3350 17 Gram(s) Oral daily  warfarin 4 milliGRAM(s) Oral once    MEDICATIONS  (PRN):  simethicone 80 milliGRAM(s) Chew every 6 hours PRN Gas  acetaminophen   Tablet. 650 milliGRAM(s) Oral every 6 hours PRN mild, moderate pain  dextrose Gel 1 Dose(s) Oral once PRN Blood Glucose LESS THAN 70 milliGRAM(s)/deciliter  glucagon  Injectable 1 milliGRAM(s) IntraMuscular once PRN Glucose LESS THAN 70 milligrams/deciliter  sodium chloride 0.65% Nasal 1 Spray(s) Both Nostrils three times a day PRN Nasal Congestion      Vital Signs Last 24 Hrs  T(C): 36.6 (24 Aug 2017 14:09), Max: 37.1 (23 Aug 2017 22:00)  T(F): 97.9 (24 Aug 2017 14:09), Max: 98.8 (23 Aug 2017 22:00)  HR: 67 (24 Aug 2017 14:09) (67 - 103)  BP: 103/70 (24 Aug 2017 14:09) (91/48 - 126/84)  BP(mean): --  RR: 18 (24 Aug 2017 14:09) (17 - 18)  SpO2: 94% (24 Aug 2017 14:09) (94% - 99%)    Constitutional: No fever, fatigue  Skin: No rash.  Eyes: No recent vision problems or eye pain.  ENT: No congestion, ear pain, or sore throat.  Cardiovascular: No chest pain or palpation.  Respiratory: No cough, shortness of breath, congestion, or wheezing.  Gastrointestinal: No abdominal pain, nausea, vomiting, or diarrhea.  Genitourinary: No dysuria.  Musculoskeletal: No joint swelling.  Neurologic: No headache.    PHYSICAL EXAM:  GENERAL: NAD  EYES: EOMI, PERRLA  NECK: Supple, No JVD  CHEST/LUNG: dec breath sounds at bases   HEART:  S1 , S2 +  ABDOMEN: soft, bs+  EXTREMITIES:  trace edema   NEUROLOGY:alert awake calm cooperative     LABS:      135  |  96<L>  |  27<H>  ----------------------------<  141<H>  3.7   |  26  |  1.31<H>    Ca    8.7      24 Aug 2017 07:05  Mg     1.5           Creatinine Trend: 1.31 <--, 1.38 <--, 1.20 <--, 1.20 <--, 1.33 <--, 1.48 <--, 1.55 <--, 2.27 <--, 2.59 <--                        9.8    10.82 )-----------( 256      ( 24 Aug 2017 07:05 )             30.6     Urine Studies:  Urinalysis Basic - ( 23 Aug 2017 07:50 )    Color: YELLOW / Appearance: CLEAR / S.013 / pH: 6.0  Gluc: NEGATIVE / Ketone: NEGATIVE  / Bili: NEGATIVE / Urobili: NORMAL E.U.   Blood: NEGATIVE / Protein: NEGATIVE / Nitrite: NEGATIVE   Leuk Esterase: NEGATIVE / RBC: 0-2 / WBC 0-2   Sq Epi: OCC / Non Sq Epi:  / Bacteria: FEW                PT/INR - ( 24 Aug 2017 07:05 )   PT: 16.3 SEC;   INR: 1.44          PTT - ( 24 Aug 2017 07:05 )  PTT:60.1 SEC
chief complaint : nausea      SUBJECTIVE / OVERNIGHT EVENTS: pt denies chest pain, shortness of breath, abd pain, nausea, v says she feels better today     MEDICATIONS  (STANDING):  atorvastatin 40 milliGRAM(s) Oral at bedtime  ferrous    sulfate 325 milliGRAM(s) Oral daily  insulin lispro (HumaLOG) corrective regimen sliding scale   SubCutaneous three times a day before meals  insulin lispro (HumaLOG) corrective regimen sliding scale   SubCutaneous at bedtime  dextrose 5%. 1000 milliLiter(s) (50 mL/Hr) IV Continuous <Continuous>  dextrose 50% Injectable 12.5 Gram(s) IV Push once  dextrose 50% Injectable 25 Gram(s) IV Push once  dextrose 50% Injectable 25 Gram(s) IV Push once  loperamide 2 milliGRAM(s) Oral two times a day  ondansetron    Tablet 8 milliGRAM(s) Oral two times a day  furosemide    Tablet 40 milliGRAM(s) Oral two times a day  pantoprazole    Tablet 40 milliGRAM(s) Oral two times a day before meals    MEDICATIONS  (PRN):  simethicone 80 milliGRAM(s) Chew every 6 hours PRN Gas  acetaminophen   Tablet. 650 milliGRAM(s) Oral every 6 hours PRN mild, moderate pain  dextrose Gel 1 Dose(s) Oral once PRN Blood Glucose LESS THAN 70 milliGRAM(s)/deciliter  glucagon  Injectable 1 milliGRAM(s) IntraMuscular once PRN Glucose LESS THAN 70 milligrams/deciliter      Vital Signs Last 24 Hrs  T(C): 36.7 (21 Aug 2017 08:42), Max: 37.1 (20 Aug 2017 14:29)  T(F): 98.1 (21 Aug 2017 08:42), Max: 98.7 (20 Aug 2017 14:29)  HR: 67 (21 Aug 2017 08:42) (63 - 99)  BP: 119/44 (21 Aug 2017 11:27) (88/60 - 144/94)  BP(mean): --  RR: 17 (21 Aug 2017 08:42) (16 - 18)  SpO2: 100% (21 Aug 2017 08:42) (92% - 100%)      Constitutional: No fever, fatigue  Skin: No rash.  Eyes: No recent vision problems or eye pain.  ENT: No congestion, ear pain, or sore throat.  Cardiovascular: No chest pain or palpation.  Respiratory: No cough, shortness of breath, congestion, or wheezing.  Gastrointestinal: No abdominal pain, nausea, vomiting, or diarrhea.  Genitourinary: No dysuria.  Musculoskeletal: No joint swelling.  Neurologic: No headache.    PHYSICAL EXAM:  GENERAL: NAD  EYES: EOMI, PERRLA  NECK: Supple, No JVD  CHEST/LUNG: dec breath sounds at bases   HEART:  S1 , S2 +  ABDOMEN: soft, bs+  EXTREMITIES:  trace edema   NEUROLOGY:alert awake calm cooperative   LABS:  08-21    139  |  101  |  40<H>  ----------------------------<  155<H>  3.8   |  24  |  1.20    Ca    9.0      21 Aug 2017 05:04  Phos  1.9     08-20  Mg     1.3     08-21    TPro  5.3<L>  /  Alb  2.8<L>  /  TBili  1.1  /  DBili      /  AST  16  /  ALT  11  /  AlkPhos  34<L>  08-20    Creatinine Trend: 1.20 <--, 1.33 <--, 1.48 <--, 1.55 <--, 2.27 <--, 2.59 <--                        10.0   10.84 )-----------( 234      ( 21 Aug 2017 05:04 )             30.6     Urine Studies:            LIVER FUNCTIONS - ( 20 Aug 2017 05:35 )  Alb: 2.8 g/dL / Pro: 5.3 g/dL / ALK PHOS: 34 u/L / ALT: 11 u/L / AST: 16 u/L / GGT: x           PT/INR - ( 21 Aug 2017 05:04 )   PT: 39.3 SEC;   INR: 3.42            11 u/L / AST: 16 u/L / GGT: x           PT/INR - ( 20 Aug 2017 05:35 )   PT: 50.7 SEC;   INR: 4.39          PTT - ( 19 Aug 2017 06:20 )  PTT:39.3 SEC
chief complaint : nausea      SUBJECTIVE / OVERNIGHT EVENTS: pt denies chest pain, shortness of breath, abd pain, nausea, v says she feels better today     MEDICATIONS  (STANDING):  atorvastatin 40 milliGRAM(s) Oral at bedtime  ferrous    sulfate 325 milliGRAM(s) Oral daily  insulin lispro (HumaLOG) corrective regimen sliding scale   SubCutaneous three times a day before meals  insulin lispro (HumaLOG) corrective regimen sliding scale   SubCutaneous at bedtime  dextrose 5%. 1000 milliLiter(s) (50 mL/Hr) IV Continuous <Continuous>  dextrose 50% Injectable 12.5 Gram(s) IV Push once  dextrose 50% Injectable 25 Gram(s) IV Push once  dextrose 50% Injectable 25 Gram(s) IV Push once  loperamide 2 milliGRAM(s) Oral two times a day  ondansetron    Tablet 8 milliGRAM(s) Oral two times a day  furosemide    Tablet 40 milliGRAM(s) Oral two times a day  pantoprazole    Tablet 40 milliGRAM(s) Oral two times a day before meals  furosemide   Injectable 20 milliGRAM(s) IV Push once    MEDICATIONS  (PRN):  simethicone 80 milliGRAM(s) Chew every 6 hours PRN Gas  acetaminophen   Tablet. 650 milliGRAM(s) Oral every 6 hours PRN mild, moderate pain  dextrose Gel 1 Dose(s) Oral once PRN Blood Glucose LESS THAN 70 milliGRAM(s)/deciliter  glucagon  Injectable 1 milliGRAM(s) IntraMuscular once PRN Glucose LESS THAN 70 milligrams/deciliter      Vital Signs Last 24 Hrs  T(C): 36.8 (20 Aug 2017 20:13), Max: 37.2 (19 Aug 2017 23:15)  T(F): 98.2 (20 Aug 2017 20:13), Max: 99 (19 Aug 2017 23:15)  HR: 91 (20 Aug 2017 20:13) (63 - 91)  BP: 94/61 (20 Aug 2017 20:13) (90/52 - 104/60)  BP(mean): --  RR: 17 (20 Aug 2017 20:13) (16 - 18)  SpO2: 96% (20 Aug 2017 20:13) (92% - 100%)      Constitutional: No fever, fatigue  Skin: No rash.  Eyes: No recent vision problems or eye pain.  ENT: No congestion, ear pain, or sore throat.  Cardiovascular: No chest pain or palpation.  Respiratory: No cough, shortness of breath, congestion, or wheezing.  Gastrointestinal: No abdominal pain, nausea, vomiting, or diarrhea.  Genitourinary: No dysuria.  Musculoskeletal: No joint swelling.  Neurologic: No headache.    PHYSICAL EXAM:  GENERAL: NAD  EYES: EOMI, PERRLA  NECK: Supple, No JVD  CHEST/LUNG: dec breath sounds at bases   HEART:  S1 , S2 +  ABDOMEN: soft, bs+  EXTREMITIES:  trace edema   NEUROLOGY:alert awake calm cooperative     LABS:  08-20    142  |  108<H>  |  70<H>  ----------------------------<  184<H>  4.4   |  20<L>  |  1.33<H>    Ca    8.8      20 Aug 2017 05:35  Phos  1.9     08-20  Mg     1.6     08-20    TPro  5.3<L>  /  Alb  2.8<L>  /  TBili  1.1  /  DBili      /  AST  16  /  ALT  11  /  AlkPhos  34<L>  08-20    Creatinine Trend: 1.33 <--, 1.48 <--, 1.55 <--, 2.27 <--, 2.59 <--                        7.9    10.21 )-----------( 208      ( 20 Aug 2017 18:00 )             24.8     Urine Studies:            LIVER FUNCTIONS - ( 20 Aug 2017 05:35 )  Alb: 2.8 g/dL / Pro: 5.3 g/dL / ALK PHOS: 34 u/L / ALT: 11 u/L / AST: 16 u/L / GGT: x           PT/INR - ( 20 Aug 2017 05:35 )   PT: 50.7 SEC;   INR: 4.39          PTT - ( 19 Aug 2017 06:20 )  PTT:39.3 SEC
chief complaint : nausea      SUBJECTIVE / OVERNIGHT EVENTS: pt denies chest pain, shortness of breath, abd pain, nausea, v says she feels better today     MEDICATIONS  (STANDING):  atorvastatin 40 milliGRAM(s) Oral at bedtime  ferrous    sulfate 325 milliGRAM(s) Oral daily  insulin lispro (HumaLOG) corrective regimen sliding scale   SubCutaneous three times a day before meals  insulin lispro (HumaLOG) corrective regimen sliding scale   SubCutaneous at bedtime  dextrose 5%. 1000 milliLiter(s) (50 mL/Hr) IV Continuous <Continuous>  dextrose 50% Injectable 12.5 Gram(s) IV Push once  dextrose 50% Injectable 25 Gram(s) IV Push once  dextrose 50% Injectable 25 Gram(s) IV Push once  loperamide 2 milliGRAM(s) Oral two times a day  ondansetron    Tablet 8 milliGRAM(s) Oral two times a day  furosemide    Tablet 40 milliGRAM(s) Oral two times a day  pantoprazole    Tablet 40 milliGRAM(s) Oral two times a day before meals  metoprolol 25 milliGRAM(s) Oral two times a day  heparin  Infusion 800 Unit(s)/Hr (8 mL/Hr) IV Continuous <Continuous>    MEDICATIONS  (PRN):  simethicone 80 milliGRAM(s) Chew every 6 hours PRN Gas  acetaminophen   Tablet. 650 milliGRAM(s) Oral every 6 hours PRN mild, moderate pain  dextrose Gel 1 Dose(s) Oral once PRN Blood Glucose LESS THAN 70 milliGRAM(s)/deciliter  glucagon  Injectable 1 milliGRAM(s) IntraMuscular once PRN Glucose LESS THAN 70 milligrams/deciliter  sodium chloride 0.65% Nasal 1 Spray(s) Both Nostrils three times a day PRN Nasal Congestion    Vital Signs Last 24 Hrs  T(C): 37.1 (22 Aug 2017 17:09), Max: 37.1 (22 Aug 2017 17:09)  T(F): 98.7 (22 Aug 2017 17:09), Max: 98.7 (22 Aug 2017 17:09)  HR: 98 (22 Aug 2017 17:09) (60 - 140)  BP: 124/52 (22 Aug 2017 17:09) (99/61 - 129/53)  BP(mean): --  RR: 18 (22 Aug 2017 17:09) (17 - 18)  SpO2: 98% (22 Aug 2017 17:09) (95% - 99%)      Constitutional: No fever, fatigue  Skin: No rash.  Eyes: No recent vision problems or eye pain.  ENT: No congestion, ear pain, or sore throat.  Cardiovascular: No chest pain or palpation.  Respiratory: No cough, shortness of breath, congestion, or wheezing.  Gastrointestinal: No abdominal pain, nausea, vomiting, or diarrhea.  Genitourinary: No dysuria.  Musculoskeletal: No joint swelling.  Neurologic: No headache.    PHYSICAL EXAM:  GENERAL: NAD  EYES: EOMI, PERRLA  NECK: Supple, No JVD  CHEST/LUNG: dec breath sounds at bases   HEART:  S1 , S2 +  ABDOMEN: soft, bs+  EXTREMITIES:  trace edema   NEUROLOGY:alert awake calm cooperative     LABS:  08-22    138  |  96<L>  |  29<H>  ----------------------------<  153<H>  3.7   |  23  |  1.20    Ca    8.9      22 Aug 2017 05:00  Phos  2.9     08-22  Mg     1.3     08-21      Creatinine Trend: 1.20 <--, 1.20 <--, 1.33 <--, 1.48 <--, 1.55 <--, 2.27 <--, 2.59 <--                        10.2   12.54 )-----------( 257      ( 22 Aug 2017 05:00 )             30.3     Urine Studies:              PT/INR - ( 22 Aug 2017 10:19 )   PT: 20.5 SEC;   INR: 1.81
chief complaint : nausea      SUBJECTIVE / OVERNIGHT EVENTS: pt denies chest pain, shortness of breath, abd pain, nausea, v says she feels better today     MEDICATIONS  (STANDING):  atorvastatin 40 milliGRAM(s) Oral at bedtime  ferrous    sulfate 325 milliGRAM(s) Oral daily  insulin lispro (HumaLOG) corrective regimen sliding scale   SubCutaneous three times a day before meals  insulin lispro (HumaLOG) corrective regimen sliding scale   SubCutaneous at bedtime  dextrose 5%. 1000 milliLiter(s) (50 mL/Hr) IV Continuous <Continuous>  dextrose 50% Injectable 12.5 Gram(s) IV Push once  dextrose 50% Injectable 25 Gram(s) IV Push once  dextrose 50% Injectable 25 Gram(s) IV Push once  ondansetron    Tablet 8 milliGRAM(s) Oral two times a day  pantoprazole    Tablet 40 milliGRAM(s) Oral two times a day before meals  metoprolol 25 milliGRAM(s) Oral two times a day  docusate sodium 100 milliGRAM(s) Oral two times a day  senna 2 Tablet(s) Oral at bedtime  polyethylene glycol 3350 17 Gram(s) Oral daily  furosemide    Tablet 40 milliGRAM(s) Oral daily    MEDICATIONS  (PRN):  simethicone 80 milliGRAM(s) Chew every 6 hours PRN Gas  acetaminophen   Tablet. 650 milliGRAM(s) Oral every 6 hours PRN mild, moderate pain  dextrose Gel 1 Dose(s) Oral once PRN Blood Glucose LESS THAN 70 milliGRAM(s)/deciliter  glucagon  Injectable 1 milliGRAM(s) IntraMuscular once PRN Glucose LESS THAN 70 milligrams/deciliter  sodium chloride 0.65% Nasal 1 Spray(s) Both Nostrils three times a day PRN Nasal Congestion      Vital Signs Last 24 Hrs  T(C): 36.3 (28 Aug 2017 19:55), Max: 36.4 (28 Aug 2017 17:56)  T(F): 97.4 (28 Aug 2017 19:55), Max: 97.6 (28 Aug 2017 17:56)  HR: 62 (28 Aug 2017 19:55) (62 - 118)  BP: 102/64 (28 Aug 2017 19:55) (100/68 - 112/70)  BP(mean): --  RR: 15 (28 Aug 2017 19:55) (15 - 18)  SpO2: 100% (28 Aug 2017 19:55) (98% - 100%)    Constitutional: No fever, fatigue  Skin: No rash.  Eyes: No recent vision problems or eye pain.  ENT: No congestion, ear pain, or sore throat.  Cardiovascular: No chest pain or palpation.  Respiratory: No cough, shortness of breath, congestion, or wheezing.  Gastrointestinal: No abdominal pain, nausea, vomiting, or diarrhea.  Genitourinary: No dysuria.  Musculoskeletal: No joint swelling.  Neurologic: No headache.    PHYSICAL EXAM:  GENERAL: NAD  EYES: EOMI, PERRLA  NECK: Supple, No JVD  CHEST/LUNG: dec breath sounds at bases   HEART:  S1 , S2 +  ABDOMEN: soft, bs+  EXTREMITIES:  trace edema   NEUROLOGY:alert awake calm cooperative       LABS:      139  |  99  |  33<H>  ----------------------------<  111<H>  4.5   |  25  |  1.34<H>    Ca    9.8      28 Aug 2017 05:30  Mg     1.7           Creatinine Trend: 1.34 <--, 1.16 <--, 1.09 <--, 1.26 <--, 1.31 <--, 1.38 <--, 1.20 <--                        9.7    8.67  )-----------( 369      ( 28 Aug 2017 05:30 )             31.4     Urine Studies:  Urinalysis Basic - ( 23 Aug 2017 07:50 )    Color: YELLOW / Appearance: CLEAR / S.013 / pH: 6.0  Gluc: NEGATIVE / Ketone: NEGATIVE  / Bili: NEGATIVE / Urobili: NORMAL E.U.   Blood: NEGATIVE / Protein: NEGATIVE / Nitrite: NEGATIVE   Leuk Esterase: NEGATIVE / RBC: 0-2 / WBC 0-2   Sq Epi: OCC / Non Sq Epi:  / Bacteria: FEW                PT/INR - ( 28 Aug 2017 05:30 )   PT: 31.8 SEC;   INR: 2.78
chief complaint : nausea      SUBJECTIVE / OVERNIGHT EVENTS: pt denies chest pain, shortness of breath, abd pain, nausea, v says she feels better today     MEDICATIONS  (STANDING):  atorvastatin 40 milliGRAM(s) Oral at bedtime  ferrous    sulfate 325 milliGRAM(s) Oral daily  insulin lispro (HumaLOG) corrective regimen sliding scale   SubCutaneous three times a day before meals  insulin lispro (HumaLOG) corrective regimen sliding scale   SubCutaneous at bedtime  dextrose 5%. 1000 milliLiter(s) (50 mL/Hr) IV Continuous <Continuous>  dextrose 50% Injectable 12.5 Gram(s) IV Push once  dextrose 50% Injectable 25 Gram(s) IV Push once  dextrose 50% Injectable 25 Gram(s) IV Push once  ondansetron    Tablet 8 milliGRAM(s) Oral two times a day  pantoprazole    Tablet 40 milliGRAM(s) Oral two times a day before meals  metoprolol 25 milliGRAM(s) Oral two times a day  docusate sodium 100 milliGRAM(s) Oral two times a day  senna 2 Tablet(s) Oral at bedtime  polyethylene glycol 3350 17 Gram(s) Oral daily  furosemide    Tablet 40 milliGRAM(s) Oral daily  predniSONE   Tablet 20 milliGRAM(s) Oral daily    MEDICATIONS  (PRN):  simethicone 80 milliGRAM(s) Chew every 6 hours PRN Gas  acetaminophen   Tablet. 650 milliGRAM(s) Oral every 6 hours PRN mild, moderate pain  dextrose Gel 1 Dose(s) Oral once PRN Blood Glucose LESS THAN 70 milliGRAM(s)/deciliter  glucagon  Injectable 1 milliGRAM(s) IntraMuscular once PRN Glucose LESS THAN 70 milligrams/deciliter  sodium chloride 0.65% Nasal 1 Spray(s) Both Nostrils three times a day PRN Nasal Congestion    Vital Signs Last 24 Hrs  T(C): 36.5 (27 Aug 2017 12:39), Max: 36.8 (26 Aug 2017 23:07)  T(F): 97.7 (27 Aug 2017 12:39), Max: 98.2 (26 Aug 2017 23:07)  HR: 93 (27 Aug 2017 17:37) (60 - 100)  BP: 113/76 (27 Aug 2017 17:37) (101/63 - 120/65)  BP(mean): --  RR: 16 (27 Aug 2017 17:37) (16 - 18)  SpO2: 100% (27 Aug 2017 17:37) (98% - 100%)    Constitutional: No fever, fatigue  Skin: No rash.  Eyes: No recent vision problems or eye pain.  ENT: No congestion, ear pain, or sore throat.  Cardiovascular: No chest pain or palpation.  Respiratory: No cough, shortness of breath, congestion, or wheezing.  Gastrointestinal: No abdominal pain, nausea, vomiting, or diarrhea.  Genitourinary: No dysuria.  Musculoskeletal: No joint swelling.  Neurologic: No headache.    PHYSICAL EXAM:  GENERAL: NAD  EYES: EOMI, PERRLA  NECK: Supple, No JVD  CHEST/LUNG: dec breath sounds at bases   HEART:  S1 , S2 +  ABDOMEN: soft, bs+  EXTREMITIES:  trace edema   NEUROLOGY:alert awake calm cooperative     LABS:      138  |  98  |  28<H>  ----------------------------<  113<H>  4.3   |  26  |  1.16    Ca    9.5      27 Aug 2017 06:00  Mg     1.7           Creatinine Trend: 1.16 <--, 1.09 <--, 1.26 <--, 1.31 <--, 1.38 <--, 1.20 <--, 1.20 <--                        9.1    7.54  )-----------( 333      ( 27 Aug 2017 06:00 )             28.4     Urine Studies:  Urinalysis Basic - ( 23 Aug 2017 07:50 )    Color: YELLOW / Appearance: CLEAR / S.013 / pH: 6.0  Gluc: NEGATIVE / Ketone: NEGATIVE  / Bili: NEGATIVE / Urobili: NORMAL E.U.   Blood: NEGATIVE / Protein: NEGATIVE / Nitrite: NEGATIVE   Leuk Esterase: NEGATIVE / RBC: 0-2 / WBC 0-2   Sq Epi: OCC / Non Sq Epi:  / Bacteria: FEW                PT/INR - ( 27 Aug 2017 06:00 )   PT: 29.1 SEC;   INR: 2.55
chief complaint : nausea      SUBJECTIVE / OVERNIGHT EVENTS: pt denies chest pain, shortness of breath, abd pain, nausea, v says she feels better today     MEDICATIONS  (STANDING):  atorvastatin 40 milliGRAM(s) Oral at bedtime  ferrous    sulfate 325 milliGRAM(s) Oral daily  insulin lispro (HumaLOG) corrective regimen sliding scale   SubCutaneous three times a day before meals  insulin lispro (HumaLOG) corrective regimen sliding scale   SubCutaneous at bedtime  dextrose 5%. 1000 milliLiter(s) (50 mL/Hr) IV Continuous <Continuous>  dextrose 50% Injectable 12.5 Gram(s) IV Push once  dextrose 50% Injectable 25 Gram(s) IV Push once  dextrose 50% Injectable 25 Gram(s) IV Push once  ondansetron    Tablet 8 milliGRAM(s) Oral two times a day  pantoprazole    Tablet 40 milliGRAM(s) Oral two times a day before meals  metoprolol 25 milliGRAM(s) Oral two times a day  heparin  Infusion 800 Unit(s)/Hr (9 mL/Hr) IV Continuous <Continuous>  docusate sodium 100 milliGRAM(s) Oral two times a day  senna 2 Tablet(s) Oral at bedtime  polyethylene glycol 3350 17 Gram(s) Oral daily  furosemide    Tablet 40 milliGRAM(s) Oral daily  predniSONE   Tablet 20 milliGRAM(s) Oral daily    MEDICATIONS  (PRN):  simethicone 80 milliGRAM(s) Chew every 6 hours PRN Gas  acetaminophen   Tablet. 650 milliGRAM(s) Oral every 6 hours PRN mild, moderate pain  dextrose Gel 1 Dose(s) Oral once PRN Blood Glucose LESS THAN 70 milliGRAM(s)/deciliter  glucagon  Injectable 1 milliGRAM(s) IntraMuscular once PRN Glucose LESS THAN 70 milligrams/deciliter  sodium chloride 0.65% Nasal 1 Spray(s) Both Nostrils three times a day PRN Nasal Congestion    Vital Signs Last 24 Hrs  T(C): 36.7 (25 Aug 2017 19:14), Max: 37.2 (25 Aug 2017 15:02)  T(F): 98 (25 Aug 2017 19:14), Max: 98.9 (25 Aug 2017 15:02)  HR: 64 (25 Aug 2017 19:14) (54 - 106)  BP: 100/54 (25 Aug 2017 19:14) (93/71 - 119/97)  BP(mean): --  RR: 18 (25 Aug 2017 19:14) (17 - 18)  SpO2: 98% (25 Aug 2017 19:14) (95% - 100%)    Constitutional: No fever, fatigue  Skin: No rash.  Eyes: No recent vision problems or eye pain.  ENT: No congestion, ear pain, or sore throat.  Cardiovascular: No chest pain or palpation.  Respiratory: No cough, shortness of breath, congestion, or wheezing.  Gastrointestinal: No abdominal pain, nausea, vomiting, or diarrhea.  Genitourinary: No dysuria.  Musculoskeletal: No joint swelling.  Neurologic: No headache.    PHYSICAL EXAM:  GENERAL: NAD  EYES: EOMI, PERRLA  NECK: Supple, No JVD  CHEST/LUNG: dec breath sounds at bases   HEART:  S1 , S2 +  ABDOMEN: soft, bs+  EXTREMITIES:  trace edema   NEUROLOGY:alert awake calm cooperative     LABS:      134<L>  |  95<L>  |  27<H>  ----------------------------<  155<H>  3.7   |  25  |  1.26    Ca    9.0      25 Aug 2017 07:00  Mg     1.6           Creatinine Trend: 1.26 <--, 1.31 <--, 1.38 <--, 1.20 <--, 1.20 <--, 1.33 <--, 1.48 <--                        8.9    10.92 )-----------( 274      ( 25 Aug 2017 07:00 )             27.5     Urine Studies:  Urinalysis Basic - ( 23 Aug 2017 07:50 )    Color: YELLOW / Appearance: CLEAR / S.013 / pH: 6.0  Gluc: NEGATIVE / Ketone: NEGATIVE  / Bili: NEGATIVE / Urobili: NORMAL E.U.   Blood: NEGATIVE / Protein: NEGATIVE / Nitrite: NEGATIVE   Leuk Esterase: NEGATIVE / RBC: 0-2 / WBC 0-2   Sq Epi: OCC / Non Sq Epi:  / Bacteria: FEW                PT/INR - ( 25 Aug 2017 07:00 )   PT: 18.9 SEC;   INR: 1.67          PTT - ( 25 Aug 2017 07:00 )  PTT:58.8 SEC

## 2017-08-29 NOTE — DISCHARGE NOTE ADULT - OTHER SIGNIFICANT FINDINGS
(PMH) Diabetes mellitus  (PMH) Atrial fibrillation  (PMH) CHF (congestive heart failure)  (PMH) Hypertension  (PSH) No significant past surgical history

## 2017-08-29 NOTE — DISCHARGE NOTE ADULT - HOSPITAL COURSE
77 y/o female, PmHx of CHF, Afib on coumadin, DM, presented with abdominal pain, nausea and vomiting. Pt was recently discharged from [facility] 1 week ago after a full cardiac work up. Pt is being admitted to telemetry for monitoring.    + Hypotension - BP: 73/53 improved to 90s/50s  +anemia hgb 6.7 s/p 2 units blood, + Occult GI following  + SHELBY - Bun/Cr: 108/2.27  + Afib -Supertherapeutic INR - INR: 4.51 now 4.39  + Abd Pain- improved  + Watery Dirrhea - r/o C-diff  + 7 beats NSVT asymptomatic    8/22 BP 88/60 given bolus     EKG: Afib @ 97, T inv I, AVL, V5-6  CE x 2 neg                INR: 4.51                 BNP: 1,274                  Na: 133  8/18 CXR - clear lungs. No pleural effusions or pneumothorax. Aortic arch calcifications. Otherwise cardiac and mediastinal silhouettes within normal limits. Trachea midline. Generalized osteopenia and spinal bilarteral shoulder degenerative changes again noted.  8/18 CT Abd/Pelvis - normal appendix. No bowel obstruction or free air. Grade 2 L4-5 anterolisthesis due to severe facet/ligament degeneration.  8/18 Renal c/s Dr. Lazcano - hyponatremia, likely sec to dehydration, improved with NS, check bmp stat, Na should not be corrected more than 10meq in 24 hours, check urine na, osmo, tsh, cortisol, SHELBY likely pre-renal lasix on hold, was given NS, renal function improving, consider GI eval for abd pain  8/18 MICU consult: Suspect pt's hypotension is hypovolemic in nature given her vomiting, diarrhea, and possible overdiuresis. In addition, POCUS was performed at bedside and it was c/w hypovolemia. Recommend giving the pt further IVF and reassessing her BP.  Pt does not require an ICU level of care at this time.   8/18 GI c/s Dr. Ricci: likely viral enteritis with nausea and diarrhea today.   will start antidiarrheal and bentyl for spasm.  hold on antibiotics.  check stool studies, no risk factor for c.diff or leukocytosis to need isolation.    8/19 Cardio: re check CBC and transfuse PRBC, GI eval, metoprolol on hold due to hypotension, Dig on hold due to elevated level . HR INR has been erratic and not consistently in normal range , hence conversion ( electrically or chemically ) would put her at risk of embolic phenomenon potentially and should be kept for last resort, Her systolic chf is chronic. if She remains hypotensive despite transfusion recommend ICU transfer for presser support and optimal evaluation for her abd pain, and acute anemia  8/19 Med: transfuse to keep hb > 9, lasix in between transfusion, likely gi bleed, protonix, gi f/u.   8/19- Being transfused 2 UPRBC - f/u post CBC  8/20 GI: change PPI to BID, s/p 2 U RBC, stool guiac, may need EGD but prefer conservative management for now  8/20 cards:BB on hold 2/2 low BP, elevated INR hold coumadin. resume lasix and follow up with GI  8/20 renal: SHELBY likely pre-renal, hyponatremia imoproved likely pdehydration. may need pressor support if BP drops  8/21-GI-no abdominal pain today, no bm.  h/h more than responded to 2 u on saturday.  patient stable? enteritis. conservaitve management discussed with Dr. Cristobal.  Risk of endosocpy now greater than benefit bsed on age and comorbidities  CXR- clear  8/23 Renal: SHELBY likely prerenal, improved, again worsen today. possible sec to prerenal sec to lasix vs ATN sec to hypotension. check urine urea, cr, eos, ua.   8/23 GI: no abdominal pain or diarrhea.  patient with improvment.  continue rx  8/23 Cardiology: Chronic Systolic [id number] held 2/2 SHELBY to resume once creatinine normal. Permanemt Atrial Fibrillation CHADS2-4 on Heparin gtt resume coumadin as per GI  8/24 Renal decreased Lasix to 40mg daily to start in AM   8/26_ Nephro: Hold IVF for now.  8/26_Imed: Anemia 2/2 blood loss, transfuse to keep hb > 9 , hb/ hct been stable, gi f/u - no egd at present. AF: rate controlled, restarted ac , f/u inr. SHELBY: likely prerenal, improving with hydration. Hypotension: improved with iv fluids. CHF: diuresis as needed - monitor volume status closely.   8/28 GI: conservative management. no overt gib at this point.  pain improved 75 y/o female, PmHx of CHF, Afib on coumadin, DM, presented with abdominal pain, nausea and vomiting. Pt was recently discharged from [facility] 1 week ago after a full cardiac work up. Pt is being admitted to telemetry for monitoring.    + Hypotension - BP: 73/53 improved to 90s/50s  +anemia hgb 6.7 s/p 2 units blood, + Occult GI following  + SHELBY - Bun/Cr: 108/2.27  + Afib -Supertherapeutic INR - INR: 4.51 now 4.39  + Abd Pain- improved  + Watery Dirrhea - r/o C-diff  + 7 beats NSVT asymptomatic    8/22 BP 88/60 given bolus     EKG: Afib @ 97, T inv I, AVL, V5-6  CE x 2 neg                INR: 4.51                 BNP: 1,274                  Na: 133  8/18 CXR - clear lungs. No pleural effusions or pneumothorax. Aortic arch calcifications. Otherwise cardiac and mediastinal silhouettes within normal limits. Trachea midline. Generalized osteopenia and spinal bilarteral shoulder degenerative changes again noted.  8/18 CT Abd/Pelvis - normal appendix. No bowel obstruction or free air. Grade 2 L4-5 anterolisthesis due to severe facet/ligament degeneration.  8/18 Renal c/s Dr. Lazcano - hyponatremia, likely sec to dehydration, improved with NS, check bmp stat, Na should not be corrected more than 10meq in 24 hours, check urine na, osmo, tsh, cortisol, SHELBY likely pre-renal lasix on hold, was given NS, renal function improving, consider GI eval for abd pain  8/18 MICU consult: Suspect pt's hypotension is hypovolemic in nature given her vomiting, diarrhea, and possible overdiuresis. In addition, POCUS was performed at bedside and it was c/w hypovolemia. Recommend giving the pt further IVF and reassessing her BP.  Pt does not require an ICU level of care at this time.   8/18 GI c/s Dr. Ricci: likely viral enteritis with nausea and diarrhea today.   will start antidiarrheal and bentyl for spasm.  hold on antibiotics.  check stool studies, no risk factor for c.diff or leukocytosis to need isolation.    8/19 Cardio: re check CBC and transfuse PRBC, GI eval, metoprolol on hold due to hypotension, Dig on hold due to elevated level . HR INR has been erratic and not consistently in normal range , hence conversion ( electrically or chemically ) would put her at risk of embolic phenomenon potentially and should be kept for last resort, Her systolic chf is chronic. if She remains hypotensive despite transfusion recommend ICU transfer for presser support and optimal evaluation for her abd pain, and acute anemia  8/19 Med: transfuse to keep hb > 9, lasix in between transfusion, likely gi bleed, protonix, gi f/u.   8/19- Being transfused 2 UPRBC - f/u post CBC  8/20 GI: change PPI to BID, s/p 2 U RBC, stool guiac, may need EGD but prefer conservative management for now  8/20 cards:BB on hold 2/2 low BP, elevated INR hold coumadin. resume lasix and follow up with GI  8/20 renal: SHELBY likely pre-renal, hyponatremia imoproved likely pdehydration. may need pressor support if BP drops  8/21-GI-no abdominal pain today, no bm.  h/h more than responded to 2 u on saturday.  patient stable? enteritis. conservaitve management discussed with Dr. Cristobal.  Risk of endosocpy now greater than benefit bsed on age and comorbidities  CXR- clear  8/23 Renal: SHELBY likely prerenal, improved, again worsen today. possible sec to prerenal sec to lasix vs ATN sec to hypotension. check urine urea, cr, eos, ua.   8/23 GI: no abdominal pain or diarrhea.  patient with improvment.  continue rx  8/23 Cardiology: Chronic Systolic [id number] held 2/2 SHELBY to resume once creatinine normal. Permanemt Atrial Fibrillation CHADS2-4 on Heparin gtt resume coumadin as per GI  8/24 Renal decreased Lasix to 40mg daily to start in AM   8/26_ Nephro: Hold IVF for now.  8/26_Imed: Anemia 2/2 blood loss, transfuse to keep hb > 9 , hb/ hct been stable, gi f/u - no egd at present. AF: rate controlled, restarted ac , f/u inr. SHELBY: likely prerenal, improving with hydration. Hypotension: improved with iv fluids. CHF: diuresis as needed - monitor volume status closely.   8/28 GI: conservative management. no overt gib at this point.  pain improved    8/29/17 Pt is medically stable for discharge to Rehab today as per Dr. Cristobal.

## 2017-08-29 NOTE — DISCHARGE NOTE ADULT - PATIENT PORTAL LINK FT
“You can access the [employer] Patient Portal, offered by [facility], by registering with the following website: [email or web address]”

## 2017-08-29 NOTE — PROGRESS NOTE ADULT - ASSESSMENT
critically ill 76 f with HTN, AFIB, Chronic systolic chf, non obstructive cad, mod AS   now with abd pain, n/v, tachycardia , acute anemia , pos occult blood in stools, elevated INR  clinically much better now s/p transfusion    1.afib  HR stable  bb on hold due to low BP   elevated INR  hold coumadin    2. Anemia  GIB  fu with GI    3. Chronic systolic chf  resume lasix
no abdominal pain or diarrhea.  patient with improvment.  continue rx
1.	SHELBY, improved.  2.	CKD 2.  3.	GI bleed.    PLAN:  Follow up BMP.
1.	SHELBY, improved.  2.	CKD 2. baseline ~ 1.1-1.3 fluctuating sec to CHF  3.	GI bleed.  4.	CHF    PLAN:  Follow up BMP.  continue lasix
1.	SHELBY, improving.  2.	GI bleed.  3.	Hypotension, improved.    PLAN:  1.	Follow up BMP.  2.	Hold IVF for now.
75 yo F, with PMH of CHF, A Fib, Diabetes, accompanied by her daughter/caregiver, presenting with diffuse abdominal pain, diarrhea, and nausea/vomiting x 1 day. Patient was discharged from the hospital 1 week ago, following a 1 week admission (08/03-08/10) for CHF. was started on lasix 40mg bid last admission .
77 yo F, with PMH of CHF, A Fib, Diabetes, accompanied by her daughter/caregiver, presenting with diffuse abdominal pain, diarrhea, and nausea/vomiting x 1 day. Patient was discharged from the hospital 1 week ago, following a 1 week admission (08/03-08/10) for CHF. was started on lasix 40mg bid last admission .
I have recommended that Jazmín continue her current medications.  She states she does not feel well but does not give any specific complaints. ? virla syndrome at admission.  continue rx.
abdominal pain improved.  on ppi. change to bid.  unclear that patient had gib.  no sign of bm since yesterday.  will discuss with dr. li favor conservative management.
conservative management. no overt gib at this piont.  pain improved
critically ill 76 f with HTN, AFIB, Chronic systolic chf, non obstructive cad, mod AS   now with abd pain, n/v, tachycardia , acute anemia , pos occult blood in stools, elevated INR  clinically much better now s/p transfusion    1.afib  HR stable  BB  a/c    2. Anemia  GIB  fu with GI    3. Chronic systolic chf  stable  cont lasix and BB
critically ill 76 f with HTN, AFIB, Chronic systolic chf, non obstructive cad, mod AS   now with abd pain, n/v, tachycardia , acute anemia , pos occult blood in stools, elevated INR  clinically much better now s/p transfusion    1.afib  HR stable  resume bb  elevated INR  hold coumadin    2. Anemia  GIB  fu with GI    3. Chronic systolic chf  resume lasix  resume BB at low dose
gi symtpoms improved, conservative management  continue rx.
hemeblobin stable.  ppi.  negative ct. egd risk >> benefit complainig of urinary incontince.
mild lower abdominal tenderness on deep palpation.  likely viral enteritis with nausea and diarrhea today.   will start antidiarrheal and bentyl for spasm.  hold on antibiotics.  check stool studies, no risk factor for c.diff or leukocytosis to need isolation
no abdominal pain or diarrhea, cotnineu rx.  conservative management for gib
no abdominal pain today, no bm.  h/h more than responded to 2 u on saturday.  patient stable? enteritis. conservaitve management discussed with Dr. Cristobal.  Risk of endosocpy now greater than benefit bsed on age and comorbidities.
no bm.  change ppi to bid.  had transfusion 2 u.  inr is elevated.  appropriate response.  let inr come down  guiac stool.  Unclear if gib.  may need egd, but prefer conservaitve management now.
no sign of gib/ anemia.  continue rx.
pain improved, continue rx
patient with improvment.  dicyclomine for discomfort

## 2017-08-29 NOTE — DISCHARGE NOTE ADULT - PLAN OF CARE
Continue to monitor. Blood pressure has improved, Continue to monitor.   Follow-up with your Private Medical Doctor within 1-2 weeks. s/p 2 units of PRBC's.   Continue to monitor hemoglobin. Rate control. Continue Metoprolol, Coumadin.   Please have your INR checked on Thursday at the Rehab to monitor your Coumadin dosing. Reduce blood glucose. Continue current medications.   Monitor your fingersticks. Maintain euvolemia. Continue Lasix, current medications.   Monitor urine output, fluid intake, weight.

## 2017-08-29 NOTE — DISCHARGE NOTE ADULT - CARE PLAN
Principal Discharge DX:	Hypotension  Secondary Diagnosis:	Anemia  Secondary Diagnosis:	Atrial fibrillation  Secondary Diagnosis:	Diabetes mellitus  Secondary Diagnosis:	CHF (congestive heart failure) Principal Discharge DX:	Hypotension  Goal:	Continue to monitor.  Instructions for follow-up, activity and diet:	Blood pressure has improved, Continue to monitor.   Follow-up with your Private Medical Doctor within 1-2 weeks.  Secondary Diagnosis:	Anemia  Goal:	Continue to monitor.  Instructions for follow-up, activity and diet:	s/p 2 units of PRBC's.   Continue to monitor hemoglobin.  Secondary Diagnosis:	Atrial fibrillation  Goal:	Rate control.  Instructions for follow-up, activity and diet:	Continue Metoprolol, Coumadin.   Please have your INR checked on Thursday at the Rehab to monitor your Coumadin dosing.  Secondary Diagnosis:	Diabetes mellitus  Goal:	Reduce blood glucose.  Instructions for follow-up, activity and diet:	Continue current medications.   Monitor your fingersticks.  Secondary Diagnosis:	CHF (congestive heart failure)  Goal:	Maintain euvolemia.  Instructions for follow-up, activity and diet:	Continue Lasix, current medications.   Monitor urine output, fluid intake, weight.

## 2017-08-29 NOTE — DISCHARGE NOTE ADULT - MEDICATION SUMMARY - MEDICATIONS TO STOP TAKING
I will STOP taking the medications listed below when I get home from the hospital:    digoxin 125 mcg (0.125 mg) oral tablet  -- 1 tab(s) by mouth once a day

## 2017-08-29 NOTE — DISCHARGE NOTE ADULT - MEDICATION SUMMARY - MEDICATIONS TO TAKE
I will START or STAY ON the medications listed below when I get home from the hospital:    acetaminophen 325 mg oral tablet  -- 2 tab(s) by mouth every 6 hours, As needed, mild, moderate pain  -- Indication: For Pain    warfarin 3 mg oral tablet  -- 3 milligram(s) by mouth once a day  -- Indication: For A-fib    glipiZIDE 10 mg oral tablet  -- 1 tab(s) by mouth once a day  -- Indication: For Diabetes mellitus    metFORMIN 1000 mg oral tablet  -- 1 tab(s) by mouth 2 times a day  -- Indication: For Diabetes mellitus    ondansetron 8 mg oral tablet  -- 1 tab(s) by mouth 2 times a day  -- Indication: For Antiemetic    atorvastatin 40 mg oral tablet  -- 1 tab(s) by mouth once a day (at bedtime)  -- Indication: For Hyperlipidemia     metoprolol tartrate 25 mg oral tablet  -- 1 tab(s) by mouth 2 times a day  -- Indication: For A-fib    furosemide 40 mg oral tablet  -- 1 tab(s) by mouth once a day  -- Indication: For CHF (congestive heart failure)    ferrous sulfate 325 mg (65 mg elemental iron) oral delayed release tablet  -- 1 tab(s) by mouth once a day  -- Indication: For Iron    senna oral tablet  -- 2 tab(s) by mouth once a day (at bedtime)  -- Indication: For laxative     docusate sodium 100 mg oral capsule  -- 1 cap(s) by mouth 2 times a day  -- Indication: For laxative     polyethylene glycol 3350 oral powder for reconstitution  -- 17 gram(s) by mouth once a day  -- Indication: For laxative     simethicone 80 mg oral tablet, chewable  -- 1 tab(s) by mouth every 6 hours, As needed, Gas  -- Indication: For GI agent     sodium chloride 0.65% nasal spray  -- 1 spray(s) into nose 3 times a day as needed for into nose congestion.   -- Indication: For nasal spray    pantoprazole 40 mg oral delayed release tablet  -- 1 tab(s) by mouth 2 times a day (before meals)  -- Indication: For GERD

## 2017-08-29 NOTE — PROGRESS NOTE ADULT - PROVIDER SPECIALTY LIST ADULT
Cardiology
Gastroenterology
Internal Medicine
Nephrology
Cardiology
Gastroenterology

## 2017-09-02 ENCOUNTER — INPATIENT (INPATIENT)
Facility: HOSPITAL | Age: 77
LOS: 5 days | Discharge: NURSING, REHAB OR LONG-TERM CARE | End: 2017-09-08
Attending: INTERNAL MEDICINE | Admitting: INTERNAL MEDICINE
Payer: MEDICARE

## 2017-09-02 VITALS
TEMPERATURE: 98 F | RESPIRATION RATE: 16 BRPM | DIASTOLIC BLOOD PRESSURE: 63 MMHG | SYSTOLIC BLOOD PRESSURE: 89 MMHG | OXYGEN SATURATION: 98 % | HEART RATE: 102 BPM

## 2017-09-02 LAB
ACANTHOCYTES BLD QL SMEAR: SIGNIFICANT CHANGE UP
ALBUMIN SERPL ELPH-MCNC: 3.4 G/DL — SIGNIFICANT CHANGE UP (ref 3.3–5)
ALP SERPL-CCNC: 48 U/L — SIGNIFICANT CHANGE UP (ref 40–120)
ALT FLD-CCNC: 21 U/L — SIGNIFICANT CHANGE UP (ref 4–33)
ANISOCYTOSIS BLD QL: SLIGHT — SIGNIFICANT CHANGE UP
APPEARANCE UR: SIGNIFICANT CHANGE UP
AST SERPL-CCNC: 87 U/L — HIGH (ref 4–32)
BASOPHILS # BLD AUTO: 0.04 K/UL — SIGNIFICANT CHANGE UP (ref 0–0.2)
BASOPHILS NFR BLD AUTO: 0.5 % — SIGNIFICANT CHANGE UP (ref 0–2)
BASOPHILS NFR SPEC: 0.9 % — SIGNIFICANT CHANGE UP (ref 0–2)
BILIRUB SERPL-MCNC: 0.6 MG/DL — SIGNIFICANT CHANGE UP (ref 0.2–1.2)
BILIRUB UR-MCNC: NEGATIVE — SIGNIFICANT CHANGE UP
BLOOD UR QL VISUAL: NEGATIVE — SIGNIFICANT CHANGE UP
BUN SERPL-MCNC: 59 MG/DL — HIGH (ref 7–23)
CALCIUM SERPL-MCNC: 8.6 MG/DL — SIGNIFICANT CHANGE UP (ref 8.4–10.5)
CHLORIDE SERPL-SCNC: 89 MMOL/L — LOW (ref 98–107)
CO2 SERPL-SCNC: 25 MMOL/L — SIGNIFICANT CHANGE UP (ref 22–31)
COLOR SPEC: SIGNIFICANT CHANGE UP
CREAT SERPL-MCNC: 2.36 MG/DL — HIGH (ref 0.5–1.3)
EOSINOPHIL # BLD AUTO: 0.15 K/UL — SIGNIFICANT CHANGE UP (ref 0–0.5)
EOSINOPHIL NFR BLD AUTO: 2 % — SIGNIFICANT CHANGE UP (ref 0–6)
EOSINOPHIL NFR FLD: 0 % — SIGNIFICANT CHANGE UP (ref 0–6)
GIANT PLATELETS BLD QL SMEAR: PRESENT — SIGNIFICANT CHANGE UP
GLUCOSE SERPL-MCNC: 128 MG/DL — HIGH (ref 70–99)
GLUCOSE UR-MCNC: NEGATIVE — SIGNIFICANT CHANGE UP
HCT VFR BLD CALC: 32.7 % — LOW (ref 34.5–45)
HGB BLD-MCNC: 10.3 G/DL — LOW (ref 11.5–15.5)
HYALINE CASTS # UR AUTO: SIGNIFICANT CHANGE UP (ref 0–?)
HYPOCHROMIA BLD QL: SIGNIFICANT CHANGE UP
IMM GRANULOCYTES # BLD AUTO: 0.02 # — SIGNIFICANT CHANGE UP
IMM GRANULOCYTES NFR BLD AUTO: 0.3 % — SIGNIFICANT CHANGE UP (ref 0–1.5)
KETONES UR-MCNC: NEGATIVE — SIGNIFICANT CHANGE UP
LEUKOCYTE ESTERASE UR-ACNC: HIGH
LYMPHOCYTES # BLD AUTO: 1.88 K/UL — SIGNIFICANT CHANGE UP (ref 1–3.3)
LYMPHOCYTES # BLD AUTO: 24.9 % — SIGNIFICANT CHANGE UP (ref 13–44)
LYMPHOCYTES NFR SPEC AUTO: 24.1 % — SIGNIFICANT CHANGE UP (ref 13–44)
MCHC RBC-ENTMCNC: 22.9 PG — LOW (ref 27–34)
MCHC RBC-ENTMCNC: 31.5 % — LOW (ref 32–36)
MCV RBC AUTO: 72.7 FL — LOW (ref 80–100)
MICROCYTES BLD QL: SLIGHT — SIGNIFICANT CHANGE UP
MONOCYTES # BLD AUTO: 0.86 K/UL — SIGNIFICANT CHANGE UP (ref 0–0.9)
MONOCYTES NFR BLD AUTO: 11.4 % — SIGNIFICANT CHANGE UP (ref 2–14)
MONOCYTES NFR BLD: 5.4 % — SIGNIFICANT CHANGE UP (ref 2–9)
MUCOUS THREADS # UR AUTO: SIGNIFICANT CHANGE UP
NEUTROPHIL AB SER-ACNC: 69.6 % — SIGNIFICANT CHANGE UP (ref 43–77)
NEUTROPHILS # BLD AUTO: 4.61 K/UL — SIGNIFICANT CHANGE UP (ref 1.8–7.4)
NEUTROPHILS NFR BLD AUTO: 60.9 % — SIGNIFICANT CHANGE UP (ref 43–77)
NITRITE UR-MCNC: NEGATIVE — SIGNIFICANT CHANGE UP
NRBC # FLD: 0 — SIGNIFICANT CHANGE UP
NT-PROBNP SERPL-SCNC: 605.4 PG/ML — SIGNIFICANT CHANGE UP
OVALOCYTES BLD QL SMEAR: SLIGHT — SIGNIFICANT CHANGE UP
PH UR: 6 — SIGNIFICANT CHANGE UP (ref 4.6–8)
PLATELET # BLD AUTO: 367 K/UL — SIGNIFICANT CHANGE UP (ref 150–400)
PLATELET COUNT - ESTIMATE: NORMAL — SIGNIFICANT CHANGE UP
PMV BLD: 9.3 FL — SIGNIFICANT CHANGE UP (ref 7–13)
POIKILOCYTOSIS BLD QL AUTO: SIGNIFICANT CHANGE UP
POLYCHROMASIA BLD QL SMEAR: SLIGHT — SIGNIFICANT CHANGE UP
POTASSIUM SERPL-MCNC: SIGNIFICANT CHANGE UP MMOL/L (ref 3.5–5.3)
POTASSIUM SERPL-SCNC: SIGNIFICANT CHANGE UP MMOL/L (ref 3.5–5.3)
PROT SERPL-MCNC: 7.6 G/DL — SIGNIFICANT CHANGE UP (ref 6–8.3)
PROT UR-MCNC: NEGATIVE — SIGNIFICANT CHANGE UP
RBC # BLD: 4.5 M/UL — SIGNIFICANT CHANGE UP (ref 3.8–5.2)
RBC # FLD: 22 % — HIGH (ref 10.3–14.5)
SCHISTOCYTES BLD QL AUTO: SLIGHT — SIGNIFICANT CHANGE UP
SODIUM SERPL-SCNC: 129 MMOL/L — LOW (ref 135–145)
SP GR SPEC: 1.01 — SIGNIFICANT CHANGE UP (ref 1–1.03)
SQUAMOUS # UR AUTO: SIGNIFICANT CHANGE UP
TARGETS BLD QL SMEAR: SLIGHT — SIGNIFICANT CHANGE UP
UROBILINOGEN FLD QL: NORMAL E.U. — SIGNIFICANT CHANGE UP (ref 0.1–0.2)
WBC # BLD: 7.56 K/UL — SIGNIFICANT CHANGE UP (ref 3.8–10.5)
WBC # FLD AUTO: 7.56 K/UL — SIGNIFICANT CHANGE UP (ref 3.8–10.5)
WBC UR QL: SIGNIFICANT CHANGE UP (ref 0–?)

## 2017-09-02 PROCEDURE — 71010: CPT | Mod: 26

## 2017-09-02 RX ORDER — SODIUM CHLORIDE 9 MG/ML
500 INJECTION INTRAMUSCULAR; INTRAVENOUS; SUBCUTANEOUS ONCE
Qty: 0 | Refills: 0 | Status: COMPLETED | OUTPATIENT
Start: 2017-09-02 | End: 2017-09-02

## 2017-09-02 RX ADMIN — SODIUM CHLORIDE 500 MILLILITER(S): 9 INJECTION INTRAMUSCULAR; INTRAVENOUS; SUBCUTANEOUS at 21:31

## 2017-09-02 NOTE — ED ADULT TRIAGE NOTE - CHIEF COMPLAINT QUOTE
Pt c/o bilat foot & shoulder pain x 1d. Also states she is weak/tired & had abnormal kidney labs (drawn today). BLE edema noted. EMS FS 74 mg/dL.

## 2017-09-02 NOTE — ED ADULT NURSE NOTE - OBJECTIVE STATEMENT
Facilitator RN: Pt AO x3, unable to ambulate, c/o bilat foot & shoulder pain x 1d with activities. Also states she is weak/tired & had abnormal kidney labs (drawn today). BLE edema noted. Also hypotensive BP noted on arrival. Denies chest pain, dizziness, SOB and n/v at this time. Evaluated by provider. Blood obtained and sent to LAB. Stage 1 pressure ulcer noted, 3x4cm on sacrum. Cardiac monitor in place/ irregular rhythm(MD made aware). Will continue to monitor. Report given primary RN. Facilitator RN: Pt AO x3, unable to ambulate, c/o bilat foot & shoulder pain x 1d with activities. Also states she is weak/tired & had abnormal kidney labs (drawn today). BLE edema noted. Also hypotensive BP noted on arrival. Denies chest pain, dizziness, SOB and n/v at this time. Evaluated by provider. Blood obtained and sent to LAB. Cardiac monitor in place/ irregular rhythm(MD made aware). Will continue to monitor. Report given primary RN.

## 2017-09-02 NOTE — ED PROVIDER NOTE - NS ED ROS FT
*Constitutional: no fevers, no chills  *Eyes: no eye pain, no blurred vision   *ENMT: no hearing changes, no nasal congestion, no sore throat  *CV: no chest pain, no palpitations, no lightheadedness  *Resp: no shortness of breath, no cough, no wheezing  *GI: no abdominal pain, no nausea, no vomiting, no diarrhea, no constipation  *G/U: no dysuria, no hematuria  *Neuro: no headache, no lightheadedness/dizziness  *MSK: no joint pain, no swelling, no redness  *Skin: no rashes, no wounds  *Heme/Lymph: no bleeding, no bruising  *Allergic/Immunologic: no environmental allergies, no food allergies, no immunosuppressive disorder   ~ Megan Craft M.D. *Constitutional: no fevers, no chills  *ENMT: no hearing changes, no nasal congestion, no sore throat  *CV: no chest pain, no palpitations, no lightheadedness  *Resp: no shortness of breath, no cough, no wheezing  *GI: no abdominal pain, no nausea, no vomiting, no diarrhea, no constipation  *G/U: no dysuria, no hematuria  *Neuro: no headache, no lightheadedness/dizziness  *MSK: no joint pain, no swelling, no redness  *Skin: no rashes, no wounds  *Heme/Lymph: no bleeding, no bruising  *Allergic/Immunologic: no environmental allergies, no food allergies, no immunosuppressive disorder   ~ Megan Craft M.D.

## 2017-09-02 NOTE — ED PROVIDER NOTE - MEDICAL DECISION MAKING DETAILS
Dyllan: Patient presents feeling weak with increasing creatinin. Probable overdiuresis in rehab. Renal artery also possible. Dyllan: Patient presents feeling weak with increasing creatinin. Probable overdiuresis in rehab. Renal artery also possible.  Venancio DOLAN: 76 year-old female w/ CHF, atrial fibrillation (on Coumadin), diabetes presents for abnormal labs/increased creatinine levels.  Since Aug 26th her Cr level has increased from 1.09 to 3.82.  Patient is making urine and has no trouble voiding; pre-renal vs intrinsic kidney disease.  Etiology likely pre-renal - patient taking 40 Lasix daily and appears dehydrated.  Will administer 500cc fluids to evaluate and re-check labs.  Plan admission for gentle rehydration and repeat lab work to evaluate kidney function.

## 2017-09-02 NOTE — ED PROVIDER NOTE - PHYSICAL EXAMINATION
*Gen: NAD, AAO*3, well-appearing, well-nourished  *HEENT: NC/AT, MMM, airway patent, trachea midline  *CV: RRR, S1/S2 present, no murmurs/rubs/gallops  *Resp: no respiratory distress, LCTAB, no wheezing/rales/rhonchi  *Abd: non-distended, soft N/Tx4, no guarding or rigidity  *Neuro: no focal neuro deficits, moving all limbs appropriately  *Extremities: no gross deformity, PMS*4  *Skin: no rashes, no wounds   ~ Megan Craft M.D.

## 2017-09-02 NOTE — ED PROVIDER NOTE - OBJECTIVE STATEMENT
76 year-old female with PMH of CHF, atrial fibrillation (on Coumadin), diabetes, accompanied by her daughter/caregiver, presenting to ED after abnormal kidney function results.  Patient is a resident of [facility] when they learned about her elevated kidney enzymes and were instructed to come to the ED for further management.  Does not mention any CP, SOB, fevers, nausea, vomiting, diarrhea, constipation, burning with urination.

## 2017-09-02 NOTE — ED PROVIDER NOTE - ATTENDING CONTRIBUTION TO CARE
I performed a history and physical exam of the patient and discussed their management with the resident. I reviewed the resident's note and agree with the documented findings and plan of care. My medical decision making and observations are found above.  Lungs clear. No rales.

## 2017-09-03 DIAGNOSIS — N17.9 ACUTE KIDNEY FAILURE, UNSPECIFIED: ICD-10-CM

## 2017-09-03 DIAGNOSIS — I10 ESSENTIAL (PRIMARY) HYPERTENSION: ICD-10-CM

## 2017-09-03 DIAGNOSIS — I50.9 HEART FAILURE, UNSPECIFIED: ICD-10-CM

## 2017-09-03 DIAGNOSIS — E11.9 TYPE 2 DIABETES MELLITUS WITHOUT COMPLICATIONS: ICD-10-CM

## 2017-09-03 DIAGNOSIS — E87.6 HYPOKALEMIA: ICD-10-CM

## 2017-09-03 DIAGNOSIS — I48.2 CHRONIC ATRIAL FIBRILLATION: ICD-10-CM

## 2017-09-03 DIAGNOSIS — I95.9 HYPOTENSION, UNSPECIFIED: ICD-10-CM

## 2017-09-03 DIAGNOSIS — I50.22 CHRONIC SYSTOLIC (CONGESTIVE) HEART FAILURE: ICD-10-CM

## 2017-09-03 LAB
ALBUMIN SERPL ELPH-MCNC: 3.7 G/DL — SIGNIFICANT CHANGE UP (ref 3.3–5)
ALP SERPL-CCNC: 61 U/L — SIGNIFICANT CHANGE UP (ref 40–120)
ALT FLD-CCNC: 10 U/L — SIGNIFICANT CHANGE UP (ref 4–33)
AST SERPL-CCNC: 17 U/L — SIGNIFICANT CHANGE UP (ref 4–32)
BASE EXCESS BLDV CALC-SCNC: 4.7 MMOL/L — SIGNIFICANT CHANGE UP
BASOPHILS # BLD AUTO: 0.03 K/UL — SIGNIFICANT CHANGE UP (ref 0–0.2)
BASOPHILS NFR BLD AUTO: 0.5 % — SIGNIFICANT CHANGE UP (ref 0–2)
BILIRUB SERPL-MCNC: 0.8 MG/DL — SIGNIFICANT CHANGE UP (ref 0.2–1.2)
BLOOD GAS VENOUS - CREATININE: 2 MG/DL — HIGH (ref 0.5–1.3)
BUN SERPL-MCNC: 51 MG/DL — HIGH (ref 7–23)
BUN SERPL-MCNC: 56 MG/DL — HIGH (ref 7–23)
CALCIUM SERPL-MCNC: 9 MG/DL — SIGNIFICANT CHANGE UP (ref 8.4–10.5)
CALCIUM SERPL-MCNC: 9.1 MG/DL — SIGNIFICANT CHANGE UP (ref 8.4–10.5)
CHLORIDE BLDV-SCNC: 94 MMOL/L — LOW (ref 96–108)
CHLORIDE SERPL-SCNC: 92 MMOL/L — LOW (ref 98–107)
CHLORIDE SERPL-SCNC: 95 MMOL/L — LOW (ref 98–107)
CO2 SERPL-SCNC: 26 MMOL/L — SIGNIFICANT CHANGE UP (ref 22–31)
CO2 SERPL-SCNC: 26 MMOL/L — SIGNIFICANT CHANGE UP (ref 22–31)
CREAT ?TM UR-MCNC: 88.02 MG/DL — SIGNIFICANT CHANGE UP
CREAT SERPL-MCNC: 1.98 MG/DL — HIGH (ref 0.5–1.3)
CREAT SERPL-MCNC: 2.26 MG/DL — HIGH (ref 0.5–1.3)
EOSINOPHIL # BLD AUTO: 0.12 K/UL — SIGNIFICANT CHANGE UP (ref 0–0.5)
EOSINOPHIL NFR BLD AUTO: 2.2 % — SIGNIFICANT CHANGE UP (ref 0–6)
EOSINOPHIL NFR URNS MANUAL: PRESENT — SIGNIFICANT CHANGE UP (ref 0–0)
GAS PNL BLDV: 133 MMOL/L — LOW (ref 136–146)
GLUCOSE BLDV-MCNC: 113 — HIGH (ref 70–99)
GLUCOSE SERPL-MCNC: 110 MG/DL — HIGH (ref 70–99)
GLUCOSE SERPL-MCNC: 112 MG/DL — HIGH (ref 70–99)
HCO3 BLDV-SCNC: 27 MMOL/L — SIGNIFICANT CHANGE UP (ref 20–27)
HCT VFR BLD CALC: 31.3 % — LOW (ref 34.5–45)
HCT VFR BLDV CALC: 31.6 % — LOW (ref 34.5–45)
HGB BLD-MCNC: 10.1 G/DL — LOW (ref 11.5–15.5)
HGB BLDV-MCNC: 10.2 G/DL — LOW (ref 11.5–15.5)
IMM GRANULOCYTES # BLD AUTO: 0.03 # — SIGNIFICANT CHANGE UP
IMM GRANULOCYTES NFR BLD AUTO: 0.5 % — SIGNIFICANT CHANGE UP (ref 0–1.5)
INR BLD: 1.58 — HIGH (ref 0.88–1.17)
LACTATE BLDV-MCNC: 1.4 MMOL/L — SIGNIFICANT CHANGE UP (ref 0.5–2)
LYMPHOCYTES # BLD AUTO: 1.37 K/UL — SIGNIFICANT CHANGE UP (ref 1–3.3)
LYMPHOCYTES # BLD AUTO: 24.7 % — SIGNIFICANT CHANGE UP (ref 13–44)
MAGNESIUM SERPL-MCNC: 1.7 MG/DL — SIGNIFICANT CHANGE UP (ref 1.6–2.6)
MCHC RBC-ENTMCNC: 23.9 PG — LOW (ref 27–34)
MCHC RBC-ENTMCNC: 32.3 % — SIGNIFICANT CHANGE UP (ref 32–36)
MCV RBC AUTO: 74 FL — LOW (ref 80–100)
MONOCYTES # BLD AUTO: 0.68 K/UL — SIGNIFICANT CHANGE UP (ref 0–0.9)
MONOCYTES NFR BLD AUTO: 12.3 % — SIGNIFICANT CHANGE UP (ref 2–14)
NEUTROPHILS # BLD AUTO: 3.32 K/UL — SIGNIFICANT CHANGE UP (ref 1.8–7.4)
NEUTROPHILS NFR BLD AUTO: 59.8 % — SIGNIFICANT CHANGE UP (ref 43–77)
NRBC # FLD: 0 — SIGNIFICANT CHANGE UP
PCO2 BLDV: 48 MMHG — SIGNIFICANT CHANGE UP (ref 41–51)
PH BLDV: 7.4 PH — SIGNIFICANT CHANGE UP (ref 7.32–7.43)
PHOSPHATE SERPL-MCNC: 2.5 MG/DL — SIGNIFICANT CHANGE UP (ref 2.5–4.5)
PLATELET # BLD AUTO: 310 K/UL — SIGNIFICANT CHANGE UP (ref 150–400)
PMV BLD: 9.5 FL — SIGNIFICANT CHANGE UP (ref 7–13)
PO2 BLDV: < 24 MMHG — LOW (ref 35–40)
POTASSIUM BLDV-SCNC: 3.2 MMOL/L — LOW (ref 3.4–4.5)
POTASSIUM SERPL-MCNC: 3.2 MMOL/L — LOW (ref 3.5–5.3)
POTASSIUM SERPL-MCNC: 3.3 MMOL/L — LOW (ref 3.5–5.3)
POTASSIUM SERPL-MCNC: SIGNIFICANT CHANGE UP MMOL/L (ref 3.5–5.3)
POTASSIUM SERPL-SCNC: 3.2 MMOL/L — LOW (ref 3.5–5.3)
POTASSIUM SERPL-SCNC: 3.3 MMOL/L — LOW (ref 3.5–5.3)
POTASSIUM SERPL-SCNC: SIGNIFICANT CHANGE UP MMOL/L (ref 3.5–5.3)
PROT SERPL-MCNC: 7.4 G/DL — SIGNIFICANT CHANGE UP (ref 6–8.3)
PROTHROM AB SERPL-ACNC: 17.9 SEC — HIGH (ref 9.8–13.1)
RBC # BLD: 4.23 M/UL — SIGNIFICANT CHANGE UP (ref 3.8–5.2)
RBC # FLD: 21.7 % — HIGH (ref 10.3–14.5)
SAO2 % BLDV: 25.7 % — LOW (ref 60–85)
SODIUM SERPL-SCNC: 133 MMOL/L — LOW (ref 135–145)
SODIUM SERPL-SCNC: 137 MMOL/L — SIGNIFICANT CHANGE UP (ref 135–145)
UUN UR-MCNC: 878.5 MG/DL — SIGNIFICANT CHANGE UP
WBC # BLD: 5.55 K/UL — SIGNIFICANT CHANGE UP (ref 3.8–10.5)
WBC # FLD AUTO: 5.55 K/UL — SIGNIFICANT CHANGE UP (ref 3.8–10.5)

## 2017-09-03 PROCEDURE — 93010 ELECTROCARDIOGRAM REPORT: CPT

## 2017-09-03 PROCEDURE — 99223 1ST HOSP IP/OBS HIGH 75: CPT

## 2017-09-03 RX ORDER — POTASSIUM CHLORIDE 20 MEQ
40 PACKET (EA) ORAL ONCE
Qty: 0 | Refills: 0 | Status: COMPLETED | OUTPATIENT
Start: 2017-09-03 | End: 2017-09-03

## 2017-09-03 RX ORDER — PANTOPRAZOLE SODIUM 20 MG/1
40 TABLET, DELAYED RELEASE ORAL
Qty: 0 | Refills: 0 | Status: DISCONTINUED | OUTPATIENT
Start: 2017-09-03 | End: 2017-09-08

## 2017-09-03 RX ORDER — DEXTROSE 50 % IN WATER 50 %
1 SYRINGE (ML) INTRAVENOUS ONCE
Qty: 0 | Refills: 0 | Status: DISCONTINUED | OUTPATIENT
Start: 2017-09-03 | End: 2017-09-08

## 2017-09-03 RX ORDER — HEPARIN SODIUM 5000 [USP'U]/ML
6000 INJECTION INTRAVENOUS; SUBCUTANEOUS ONCE
Qty: 0 | Refills: 0 | Status: COMPLETED | OUTPATIENT
Start: 2017-09-03 | End: 2017-09-03

## 2017-09-03 RX ORDER — DOCUSATE SODIUM 100 MG
100 CAPSULE ORAL
Qty: 0 | Refills: 0 | Status: DISCONTINUED | OUTPATIENT
Start: 2017-09-03 | End: 2017-09-08

## 2017-09-03 RX ORDER — GLUCAGON INJECTION, SOLUTION 0.5 MG/.1ML
1 INJECTION, SOLUTION SUBCUTANEOUS ONCE
Qty: 0 | Refills: 0 | Status: DISCONTINUED | OUTPATIENT
Start: 2017-09-03 | End: 2017-09-08

## 2017-09-03 RX ORDER — HEPARIN SODIUM 5000 [USP'U]/ML
6000 INJECTION INTRAVENOUS; SUBCUTANEOUS EVERY 6 HOURS
Qty: 0 | Refills: 0 | Status: DISCONTINUED | OUTPATIENT
Start: 2017-09-03 | End: 2017-09-05

## 2017-09-03 RX ORDER — POLYETHYLENE GLYCOL 3350 17 G/17G
17 POWDER, FOR SOLUTION ORAL DAILY
Qty: 0 | Refills: 0 | Status: DISCONTINUED | OUTPATIENT
Start: 2017-09-03 | End: 2017-09-08

## 2017-09-03 RX ORDER — PIPERACILLIN AND TAZOBACTAM 4; .5 G/20ML; G/20ML
3.38 INJECTION, POWDER, LYOPHILIZED, FOR SOLUTION INTRAVENOUS EVERY 8 HOURS
Qty: 0 | Refills: 0 | Status: DISCONTINUED | OUTPATIENT
Start: 2017-09-03 | End: 2017-09-03

## 2017-09-03 RX ORDER — METOPROLOL TARTRATE 50 MG
25 TABLET ORAL
Qty: 0 | Refills: 0 | Status: DISCONTINUED | OUTPATIENT
Start: 2017-09-03 | End: 2017-09-08

## 2017-09-03 RX ORDER — ONDANSETRON 8 MG/1
4 TABLET, FILM COATED ORAL EVERY 6 HOURS
Qty: 0 | Refills: 0 | Status: DISCONTINUED | OUTPATIENT
Start: 2017-09-03 | End: 2017-09-08

## 2017-09-03 RX ORDER — SIMETHICONE 80 MG/1
80 TABLET, CHEWABLE ORAL EVERY 6 HOURS
Qty: 0 | Refills: 0 | Status: DISCONTINUED | OUTPATIENT
Start: 2017-09-03 | End: 2017-09-08

## 2017-09-03 RX ORDER — SODIUM CHLORIDE 9 MG/ML
1000 INJECTION, SOLUTION INTRAVENOUS
Qty: 0 | Refills: 0 | Status: DISCONTINUED | OUTPATIENT
Start: 2017-09-03 | End: 2017-09-08

## 2017-09-03 RX ORDER — WARFARIN SODIUM 2.5 MG/1
5 TABLET ORAL ONCE
Qty: 0 | Refills: 0 | Status: COMPLETED | OUTPATIENT
Start: 2017-09-03 | End: 2017-09-03

## 2017-09-03 RX ORDER — SENNA PLUS 8.6 MG/1
2 TABLET ORAL AT BEDTIME
Qty: 0 | Refills: 0 | Status: DISCONTINUED | OUTPATIENT
Start: 2017-09-03 | End: 2017-09-08

## 2017-09-03 RX ORDER — HEPARIN SODIUM 5000 [USP'U]/ML
3000 INJECTION INTRAVENOUS; SUBCUTANEOUS EVERY 6 HOURS
Qty: 0 | Refills: 0 | Status: DISCONTINUED | OUTPATIENT
Start: 2017-09-03 | End: 2017-09-05

## 2017-09-03 RX ORDER — ATORVASTATIN CALCIUM 80 MG/1
40 TABLET, FILM COATED ORAL AT BEDTIME
Qty: 0 | Refills: 0 | Status: DISCONTINUED | OUTPATIENT
Start: 2017-09-03 | End: 2017-09-08

## 2017-09-03 RX ORDER — ACETAMINOPHEN 500 MG
650 TABLET ORAL EVERY 6 HOURS
Qty: 0 | Refills: 0 | Status: DISCONTINUED | OUTPATIENT
Start: 2017-09-03 | End: 2017-09-08

## 2017-09-03 RX ORDER — DEXTROSE 50 % IN WATER 50 %
25 SYRINGE (ML) INTRAVENOUS ONCE
Qty: 0 | Refills: 0 | Status: DISCONTINUED | OUTPATIENT
Start: 2017-09-03 | End: 2017-09-08

## 2017-09-03 RX ORDER — HEPARIN SODIUM 5000 [USP'U]/ML
INJECTION INTRAVENOUS; SUBCUTANEOUS
Qty: 25000 | Refills: 0 | Status: DISCONTINUED | OUTPATIENT
Start: 2017-09-03 | End: 2017-09-05

## 2017-09-03 RX ORDER — MAGNESIUM SULFATE 500 MG/ML
1 VIAL (ML) INJECTION ONCE
Qty: 0 | Refills: 0 | Status: COMPLETED | OUTPATIENT
Start: 2017-09-03 | End: 2017-09-03

## 2017-09-03 RX ORDER — FERROUS SULFATE 325(65) MG
325 TABLET ORAL DAILY
Qty: 0 | Refills: 0 | Status: DISCONTINUED | OUTPATIENT
Start: 2017-09-03 | End: 2017-09-08

## 2017-09-03 RX ORDER — INSULIN LISPRO 100/ML
VIAL (ML) SUBCUTANEOUS
Qty: 0 | Refills: 0 | Status: DISCONTINUED | OUTPATIENT
Start: 2017-09-03 | End: 2017-09-08

## 2017-09-03 RX ORDER — SODIUM CHLORIDE 9 MG/ML
500 INJECTION INTRAMUSCULAR; INTRAVENOUS; SUBCUTANEOUS ONCE
Qty: 0 | Refills: 0 | Status: COMPLETED | OUTPATIENT
Start: 2017-09-03 | End: 2017-09-03

## 2017-09-03 RX ORDER — DEXTROSE 50 % IN WATER 50 %
12.5 SYRINGE (ML) INTRAVENOUS ONCE
Qty: 0 | Refills: 0 | Status: DISCONTINUED | OUTPATIENT
Start: 2017-09-03 | End: 2017-09-08

## 2017-09-03 RX ORDER — SODIUM CHLORIDE 0.65 %
1 AEROSOL, SPRAY (ML) NASAL THREE TIMES A DAY
Qty: 0 | Refills: 0 | Status: DISCONTINUED | OUTPATIENT
Start: 2017-09-03 | End: 2017-09-08

## 2017-09-03 RX ADMIN — WARFARIN SODIUM 5 MILLIGRAM(S): 2.5 TABLET ORAL at 19:47

## 2017-09-03 RX ADMIN — Medication 650 MILLIGRAM(S): at 08:20

## 2017-09-03 RX ADMIN — SODIUM CHLORIDE 1000 MILLILITER(S): 9 INJECTION INTRAMUSCULAR; INTRAVENOUS; SUBCUTANEOUS at 03:00

## 2017-09-03 RX ADMIN — Medication 40 MILLIEQUIVALENT(S): at 12:21

## 2017-09-03 RX ADMIN — PANTOPRAZOLE SODIUM 40 MILLIGRAM(S): 20 TABLET, DELAYED RELEASE ORAL at 06:44

## 2017-09-03 RX ADMIN — HEPARIN SODIUM 6000 UNIT(S): 5000 INJECTION INTRAVENOUS; SUBCUTANEOUS at 20:20

## 2017-09-03 RX ADMIN — Medication 100 GRAM(S): at 12:22

## 2017-09-03 RX ADMIN — Medication 325 MILLIGRAM(S): at 12:23

## 2017-09-03 RX ADMIN — Medication 25 MILLIGRAM(S): at 17:23

## 2017-09-03 RX ADMIN — ATORVASTATIN CALCIUM 40 MILLIGRAM(S): 80 TABLET, FILM COATED ORAL at 19:47

## 2017-09-03 RX ADMIN — HEPARIN SODIUM 1300 UNIT(S)/HR: 5000 INJECTION INTRAVENOUS; SUBCUTANEOUS at 20:20

## 2017-09-03 RX ADMIN — Medication: at 13:30

## 2017-09-03 NOTE — H&P ADULT - PROBLEM SELECTOR PLAN 4
- Monitor FSG  - Insulin sliding scale - Continue coumadin  - Hold metoprolol for now given BP in 80s

## 2017-09-03 NOTE — H&P ADULT - PROBLEM SELECTOR PLAN 1
- Likely prerenal/ med related. Will give 500cc bolus, however given pt also has CHF will need to monitor vol status carefully  - Hold lasix  - Renal consult in am- Dr Lazcano - Likely prerenal/ med related vs ATN in setting of hypotension. Will give 500cc bolus, however given pt also has CHF will need to monitor vol status carefully  - Hold lasix  - Renal consult in am- Dr Lazcano

## 2017-09-03 NOTE — H&P ADULT - PROBLEM SELECTOR PROBLEM 4
Type 2 diabetes mellitus without complication, without long-term current use of insulin Chronic atrial fibrillation

## 2017-09-03 NOTE — H&P ADULT - PROBLEM SELECTOR PLAN 2
- Currently euvolemic; may be slightly hypovolemic from diuresis + poor PO intake   - Monitor vol status  - Hold lasix given her SHELBY. Also holding metoprolol for now given BP in 80s   - Cardiology consult in am- Dr Jorge Plummer for medication optimization - Suspect this is 2/2 hypovolemia. Will give additional IVFs  - Hold lasix and metoprolol

## 2017-09-03 NOTE — H&P ADULT - NSHPLABSRESULTS_GEN_ALL_CORE
.  LABS:                         10.3   7.56  )-----------( 367      ( 02 Sep 2017 21:04 )             32.7         x   |  x   |  x   ----------------------------<  x   3.3<L>   |  x   |  x     Ca    9.0      02 Sep 2017 23:10    TPro  7.6  /  Alb  3.4  /  TBili  0.6  /  DBili  x   /  AST  87<H>  /  ALT  21  /  AlkPhos  48      PT/INR - ( 01 Sep 2017 10:37 )   PT: 26.0 sec;   INR: 2.26 ratio           Urinalysis Basic - ( 02 Sep 2017 21:47 )    Color: PLYEL / Appearance: HAZY / S.012 / pH: 6.0  Gluc: NEGATIVE / Ketone: NEGATIVE  / Bili: NEGATIVE / Urobili: NORMAL E.U.   Blood: NEGATIVE / Protein: NEGATIVE / Nitrite: NEGATIVE   Leuk Esterase: LARGE / RBC: x / WBC 25-50   Sq Epi: OCC / Non Sq Epi: x / Bacteria: x          Serum Pro-Brain Natriuretic Peptide: 605.4 pg/mL ( @ 21:04)        RADIOLOGY, EKG & ADDITIONAL TESTS: Reviewed.

## 2017-09-03 NOTE — H&P ADULT - NSHPREVIEWOFSYSTEMS_GEN_ALL_CORE
REVIEW OF SYSTEMS:    CONSTITUTIONAL: No weakness, fevers or chills, no weight loss  EYES/ENT: No visual changes;  No dysphagia or odynophagia, no tinnitus  NECK: No pain or stiffness  RESPIRATORY: No cough, wheezing, hemoptysis; No shortness of breath  CARDIOVASCULAR: No chest pain or palpitations; No lower extremity edema  GASTROINTESTINAL: No abdominal or epigastric pain. No nausea, vomiting, or hematemesis; No diarrhea or constipation. No melena or hematochezia.  MUSCULOSKELETAL: + foot pain, No joint pain, swelling, erythema or warmth, no back pain  GENITOURINARY: No dysuria, frequency or hematuria, no suprapubic pain  NEUROLOGICAL: No numbness or weakness, no headache, no syncope, no gait abnormalities   SKIN: No itching, burning, rashes, or lesions   All other review of systems is negative unless indicated above.

## 2017-09-03 NOTE — H&P ADULT - HISTORY OF PRESENT ILLNESS
77 yo F with DM, HTN, CHF, A fib on coumadin sent from rehab center for SHELBY. Pt recently admitted for CHF and SHELBY discharged to rehab 1 week ago. Labwork done in rehab showed SHELBY so pt sent to the ED. Pt only c/o foot pain which she has had since prior admission, otherwise feels close to her baseline. She has stable 3 pillow orthopnea, no LE edema, no SOB.     ED VS : 89 /63  102  97.5 16  98%   500 cc bolus 77 yo F with DM, HTN, CHF, A fib on coumadin sent from rehab center for SHELBY. Pt recently admitted for anemia, SHELBY in setting of CHF, required pRBC transfusion and was discharged to rehab 1 week ago. Labwork done in rehab showed SHELBY so pt was sent to the ED. Pt only c/o foot pain which she has had since prior admission, otherwise feels close to her baseline. She has stable 3 pillow orthopnea, no LE edema, no SOB.     ED VS : 89 /63  102  97.5 16  98%   500 cc bolus given

## 2017-09-03 NOTE — CONSULT NOTE ADULT - ATTENDING COMMENTS
Thank you for the courtesy of the consultation,I would be available for any further discussion if needed.  Jorge Plummer MD,FACC.  [address] [place], [place] [id number] [phone]

## 2017-09-03 NOTE — CONSULT NOTE ADULT - PROBLEM SELECTOR RECOMMENDATION 2
Likely prerenal state, possible dehydration. RF is improving  Monitor BMP Likely prerenal state, possible dehydration. Renal Function is improving  Monitor BMP  Get Urine for urea nitrogen, urine creatinine, Urine eosinophil, UA

## 2017-09-03 NOTE — CONSULT NOTE ADULT - ASSESSMENT
75 yo F with DM, HTN, HFrEF, A fib on coumadin sent from rehab center for SHELBY.    Problem/Plan - 1:  ·  Problem: SHELBY (acute kidney injury).  Plan: - Likely prerenal/ med related vs ATN in setting of hypotension. - Hold lasix    Problem/Plan - 2:  ·  Problem: Hypotension, unspecified hypotension type.  Plan: - Suspect this is 2/2 hypovolemia. Will give additional IVFs  - Hold lasix and metoprolol.     Problem/Plan - 3:  ·  Problem: Chronic systolic congestive heart failure.  Plan: - May be slightly hypovolemic from diuresis + poor PO intake   - Monitor volume status closely  - Hold lasix given her AK- Hold lasix given her SHELBY. Also holding metoprolol for now given BP in 80s   - Cardiology consult in am- Dr Jorge Plummer for medication optimization  Problem/Plan - 4:  ·  Problem: Chronic atrial fibrillation.  Plan: - Continue coumadin- Restart Metoprolol.    Problem/Plan - 5:  ·  Problem: Type 2 diabetes mellitus without complication, without long-term current use of insulin.  Plan: - Monitor FSG  - Insulin sliding scale.
75 yo female with history of DM, HT, CHF, A-fib, on AC, found to have SHELBY

## 2017-09-03 NOTE — H&P ADULT - NSHPSOCIALHISTORY_GEN_ALL_CORE
Social History:    Marital Status:  (   )    (   ) Single    (   )    ( x )   Occupation:   Lives with: sent from [facility]    Substance Use (street drugs): ( x ) never used  (  ) other:  Tobacco Usage:  ( x  ) never smoked   (   ) former smoker   (   ) current smoker  (     ) pack year  (        ) last cigarette date  Alcohol Usage: none  Sexual History:     (     ) Advanced Directives: (  x   ) None    (      ) DNR    (     ) DNI    (     ) Health Care Proxy:

## 2017-09-03 NOTE — PROVIDER CONTACT NOTE (OTHER) - BACKGROUND
Admit Diagnosis) Acute renal failure  (PMH) Diabetes mellitus  (PMH) Atrial fibrillation  (PMH) CHF (congestive heart failure)

## 2017-09-03 NOTE — CONSULT NOTE ADULT - SUBJECTIVE AND OBJECTIVE BOX
CHIEF COMPLAINT:    HPI:77 yo F with T2DM, HTN, HF, Atrial  Fibrillation  on Coumadin sent from rehab center for SHELBY.She has stable 3 pillow orthopnea, no LE edema, no SOB.Denies chest pain     PAST MEDICAL & SURGICAL HISTORY:  Diabetes mellitus  Atrial fibrillation: on coumadin  HF (congestive heart failure)  Hypertension  No significant past surgical history      MEDICATIONS  (STANDING):  atorvastatin 40 milliGRAM(s) Oral at bedtime  ferrous    sulfate 325 milliGRAM(s) Oral daily  pantoprazole    Tablet 40 milliGRAM(s) Oral before breakfast  insulin lispro (HumaLOG) corrective regimen sliding scale   SubCutaneous three times a day before meals  dextrose 5%. 1000 milliLiter(s) (50 mL/Hr) IV Continuous <Continuous>  dextrose 50% Injectable 12.5 Gram(s) IV Push once  dextrose 50% Injectable 25 Gram(s) IV Push once  dextrose 50% Injectable 25 Gram(s) IV Push once    MEDICATIONS  (PRN):  acetaminophen   Tablet 650 milliGRAM(s) Oral every 6 hours PRN mild, moderate pain  ondansetron    Tablet 4 milliGRAM(s) Oral every 6 hours PRN Nausea and/or Vomiting  docusate sodium 100 milliGRAM(s) Oral two times a day PRN Constipation  senna 2 Tablet(s) Oral at bedtime PRN Constipation  polyethylene glycol 3350 17 Gram(s) Oral daily PRN Constipation  sodium chloride 0.65% Nasal 1 Spray(s) Both Nostrils three times a day PRN Nasal Congestion  simethicone 80 milliGRAM(s) Chew every 6 hours PRN Gas  dextrose Gel 1 Dose(s) Oral once PRN Blood Glucose LESS THAN 70 milliGRAM(s)/deciliter  glucagon  Injectable 1 milliGRAM(s) IntraMuscular once PRN Glucose LESS THAN 70 milligrams/deciliter      FAMILY HISTORY:  No pertinent family history in first degree relatives    No family history of premature coronary artery disease or sudden cardiac death    SOCIAL HISTORY:  Smoking-Non Smoker  Alcohol-Denies  Ilicit Drug use-Denies    REVIEW OF SYSTEMS:  CONSTITUTIONAL: No weakness, fevers or chills, no weight loss  EYES/ENT: No visual changes;  No dysphagia or odynophagia, no tinnitus  NECK: No pain or stiffness  RESPIRATORY: No cough, wheezing, hemoptysis; No shortness of breath  CARDIOVASCULAR: No chest pain or palpitations; No lower extremity edema  GASTROINTESTINAL: No abdominal or epigastric pain. No nausea, vomiting, or hematemesis; No diarrhea or constipation. No melena or hematochezia.  MUSCULOSKELETAL: + foot pain, No joint pain, swelling, erythema or warmth, no back pain  GENITOURINARY: No dysuria, frequency or hematuria, no suprapubic pain  NEUROLOGICAL: No numbness or weakness, no headache, no syncope, no gait abnormalities   SKIN: No itching, burning, rashes, or lesions     [ x] All others negative	  [ ] Unable to obtain    Vital Signs Last 24 Hrs  T(C): 36.7 (03 Sep 2017 06:36), Max: 36.7 (03 Sep 2017 04:34)  T(F): 98 (03 Sep 2017 06:36), Max: 98 (03 Sep 2017 04:34)  HR: 90 (03 Sep 2017 06:36) (72 - 107)  BP: 106/64 (03 Sep 2017 06:36) (88/53 - 106/64)  RR: 17 (03 Sep 2017 06:36) (12 - 17)  SpO2: 98% (03 Sep 2017 06:36) (98% - 100%)  I&O's Summary    02 Sep 2017 07:01  -  03 Sep 2017 07:00  --------------------------------------------------------  IN: 100 mL / OUT: 0 mL / NET: 100 mL        PHYSICAL EXAM:  General: No acute distress  HEENT: EOMI, PERRL  Neck: Supple, No JVD  Lungs: Clear to percussion bilaterally; No rales or wheezing  Heart: Irregular irregular rhythm; 2/6 systolic murmurs, rubs, or gallops  Abdomen: Nontender, bowel sounds present  Extremities: No clubbing, cyanosis, or edema  Nervous system:  Alert & Oriented X3, no focal deficits  Psychiatric: Normal affect  Skin: No rashes or lesions      LABS:  09-03    137  |  95<L>  |  51<H>  ----------------------------<  110<H>  3.2<L>   |  26  |  1.98    Ca    9.1      03 Sep 2017 05:44  Phos  2.5     09-03  Mg     1.7     09-03    TPro  7.4  /  Alb  3.7  /  TBili  0.8  /  DBili  x   /  AST  17  /  ALT  10  /  AlkPhos  61  09-03    Creatinine Trend: 1.98<--, 2.26<--, 2.36<--, 3.82<--, 2.89<--, 1.30<--                        10.1   5.55  )-----------( 310      ( 03 Sep 2017 05:44 )             31.3     PT/INR - ( 01 Sep 2017 10:37 )   PT: 26.0 sec;   INR: 2.26 ratio             Lipid Panel:   Cardiac Enzymes:     Serum Pro-Brain Natriuretic Peptide: 605.4 pg/mL (09-02-17 @ 21:04)        RADIOLOGY RAD CHEST PORTABLE ROUTINE  IMPRESSION:  Clear lungs.    ECG [my interpretation]:Atrial fibrillationST & T wave abnormality, consider inferolateral ischemia or digitalis effect    TELEMETRY:Atrial fibrillation bursts RVR    ECHO:CONCLUSIONS:  1. Mitral annular calcification, tethered mitral valve leafletes. Mild mitral regurgitation which increased to moderate with increase in atrial fibrillation rates briefly to about 140 bpm.  2. Calcified trileaflet aortic valve with decreased opening. Peak transaortic valve gradient equals 16 mm Hg, mean transaortic valve gradient equals 8 mm Hg, estimated aortic valve area equals 1.1 sqcm (by continuity equation), consistent with moderate aortic stenosis.  3. Left atrial enlargement. Spontaneous echo contrast seen in the left atrial appendage. No thrombus visualized. Decreased left atrial appendage velocities.  4. Severe global left ventricular systolic dysfunction.  5. Normal right ventricular size with decreased right ventricular systolic function.  6. Estimated pulmonary artery systolic pressure equals 44 mm Hg, assuming right atrial pressure equals 10  mm Hg, consistent with mild pulmonary hypertension.  7. Agitated saline injection demonstrates no evidence of a patent foramen ovale.

## 2017-09-03 NOTE — CONSULT NOTE ADULT - SUBJECTIVE AND OBJECTIVE BOX
HPI:  77 yo F with DM, HTN, CHF, A fib on coumadin sent from rehab center for SHELBY. Pt recently admitted for anemia, SHELBY in setting of CHF, required pRBC transfusion and was discharged to rehab 1 week ago. Labwork done in rehab showed SHELBY so pt was sent to the ED. Pt only c/o foot pain which she has had since prior admission, otherwise feels close to her baseline. She has stable 3 pillow orthopnea, no LE edema, no SOB.     ED VS : 89 /63  102  97.5 16  98%   500 cc bolus given (03 Sep 2017 02:28)      Allergies:  clavulanate (Unknown)  erythromycin (Hives; Rash)      PAST MEDICAL & SURGICAL HISTORY:  Diabetes mellitus  Atrial fibrillation: on coumadin  CHF (congestive heart failure)  Hypertension  No significant past surgical history      Home Medications Reviewed    Hospital Medications:   MEDICATIONS  (STANDING):  atorvastatin 40 milliGRAM(s) Oral at bedtime  ferrous    sulfate 325 milliGRAM(s) Oral daily  pantoprazole    Tablet 40 milliGRAM(s) Oral before breakfast  insulin lispro (HumaLOG) corrective regimen sliding scale   SubCutaneous three times a day before meals  dextrose 5%. 1000 milliLiter(s) (50 mL/Hr) IV Continuous <Continuous>  dextrose 50% Injectable 12.5 Gram(s) IV Push once  dextrose 50% Injectable 25 Gram(s) IV Push once  dextrose 50% Injectable 25 Gram(s) IV Push once      SOCIAL HISTORY:  Denies ETOh, Smoking,     FAMILY HISTORY:  No pertinent family history in first degree relatives      REVIEW OF SYSTEMS:  CONSTITUTIONAL: No weakness, fevers or chills  EYES/ENT: No visual changes;  No vertigo or throat pain   NECK: No pain or stiffness  RESPIRATORY: No cough, wheezing, hemoptysis; No shortness of breath  CARDIOVASCULAR: No chest pain or palpitations.  GASTROINTESTINAL: No abdominal or epigastric pain. No nausea, vomiting, or hematemesis; No diarrhea or constipation. No melena or hematochezia.  GENITOURINARY: No dysuria, frequency, foamy urine, urinary urgency, incontinence or hematuria  NEUROLOGICAL: No numbness or weakness  SKIN: No itching, burning, rashes, or lesions   VASCULAR: No bilateral lower extremity edema.   All other review of systems is negative unless indicated above.    VITALS:  T(F): 98 (17 @ 06:36), Max: 98 (17 @ 04:34)  HR: 90 (17 @ 06:36)  BP: 106/64 (17 @ 06:36)  RR: 17 (17 @ 06:36)  SpO2: 98% (17 @ 06:36)  Wt(kg): --     @ 07:01  -   @ 07:00  --------------------------------------------------------  IN: 100 mL / OUT: 0 mL / NET: 100 mL      Height (cm): 167.64 ( @ :34)  Weight (kg): 72.8 (:34)  BMI (kg/m2): 25.9 (:34)  BSA (m2): 1.82 ( 04:34)    PHYSICAL EXAM:  Constitutional: NAD  HEENT: anicteric sclera, oropharynx clear, MMM  Neck: No JVD  Respiratory: CTAB, no wheezes, rales or rhonchi  Cardiovascular: S1, S2, RRR  Gastrointestinal: BS+, soft, NT/ND  Extremities: No cyanosis or clubbing. No peripheral edema  Neurological: A/O x 3, no focal deficits  Psychiatric: Normal mood, normal affect  : No CVA tenderness. No crain.   Skin: No rashes  Vascular Access:    LABS:      137  |  95<L>  |  51<H>  ----------------------------<  110<H>  3.2<L>   |  26  |  1.98<H>    Ca    9.1      03 Sep 2017 05:44  Phos  2.5       Mg     1.7         TPro  7.4  /  Alb  3.7  /  TBili  0.8  /  DBili      /  AST  17  /  ALT  10  /  AlkPhos  61      Creatinine Trend: 1.98 <--, 2.26 <--, 2.36 <--, 3.82 <--, 2.89 <--, 1.30 <--, 1.34 <--                        10.1   5.55  )-----------( 310      ( 03 Sep 2017 05:44 )             31.3     Urine Studies:  Urinalysis Basic - ( 02 Sep 2017 21:47 )    Color: PLYEL / Appearance: HAZY / S.012 / pH: 6.0  Gluc: NEGATIVE / Ketone: NEGATIVE  / Bili: NEGATIVE / Urobili: NORMAL E.U.   Blood: NEGATIVE / Protein: NEGATIVE / Nitrite: NEGATIVE   Leuk Esterase: LARGE / RBC:  / WBC 25-50   Sq Epi: OCC / Non Sq Epi:  / Bacteria:           RADIOLOGY & ADDITIONAL STUDIES: HPI:  75 yo F with DM, HTN, CHF, A fib on coumadin sent from rehab center for SHELBY. Pt recently admitted for anemia, SHELBY in setting of CHF, required pRBC transfusion and was discharged to rehab 1 week ago. Labwork done in rehab showed SHELBY so pt was sent to the ED. Pt only c/o foot pain which she has had since prior admission, otherwise feels close to her baseline. She has stable 3 pillow orthopnea, no LE edema, no SOB.     ED VS : 89 /63  102  97.5 16  98%   500 cc bolus given (03 Sep 2017 02:28)      Allergies:  clavulanate (Unknown)  erythromycin (Hives; Rash)      PAST MEDICAL & SURGICAL HISTORY:  Diabetes mellitus  Atrial fibrillation: on coumadin  CHF (congestive heart failure)  Hypertension  No significant past surgical history      Home Medications Reviewed    Hospital Medications:   MEDICATIONS  (STANDING):  atorvastatin 40 milliGRAM(s) Oral at bedtime  ferrous    sulfate 325 milliGRAM(s) Oral daily  pantoprazole    Tablet 40 milliGRAM(s) Oral before breakfast  insulin lispro (HumaLOG) corrective regimen sliding scale   SubCutaneous three times a day before meals  dextrose 5%. 1000 milliLiter(s) (50 mL/Hr) IV Continuous <Continuous>  dextrose 50% Injectable 12.5 Gram(s) IV Push once  dextrose 50% Injectable 25 Gram(s) IV Push once  dextrose 50% Injectable 25 Gram(s) IV Push once      SOCIAL HISTORY:  Denies ETOh, Smoking,     FAMILY HISTORY:  No pertinent family history in first degree relatives      REVIEW OF SYSTEMS:  CONSTITUTIONAL: No weakness, fevers or chills  EYES/ENT: No visual changes;  No vertigo or throat pain   NECK: No pain or stiffness  RESPIRATORY: No cough, wheezing, hemoptysis; No shortness of breath  CARDIOVASCULAR: No chest pain or palpitations.  GASTROINTESTINAL: No abdominal or epigastric pain. No nausea, vomiting, or hematemesis; No diarrhea or constipation. No melena or hematochezia.  GENITOURINARY: No dysuria, frequency, foamy urine, urinary urgency, incontinence or hematuria  NEUROLOGICAL: No numbness or weakness  SKIN: No itching, burning, rashes, or lesions   VASCULAR: No bilateral lower extremity edema.   All other review of systems is negative unless indicated above.      Pt seen and examined at bedside.  No chest pain/no SOB    VITALS:  T(F): 98 (09-03-17 @ 06:36), Max: 98 (17 @ 04:34)  HR: 90 (17 @ 06:36)  BP: 106/64 (17 @ 06:36)  RR: 17 (17 @ 06:36)  SpO2: 98% (17 @ 06:36)  Wt(kg): --     @ 07:01  -   @ 07:00  --------------------------------------------------------  IN: 100 mL / OUT: 0 mL / NET: 100 mL      Height (cm): 167.64 ( @ 04:34)  Weight (kg): 72.8 ( @ 04:34)  BMI (kg/m2): 25.9 (:34)  BSA (m2): 1.82 (:34)    PHYSICAL EXAM:  Constitutional: NAD  HEENT: anicteric sclera, oropharynx clear, MMM  Neck: No JVD  Respiratory: CTAB, no wheezes, rales or rhonchi  Cardiovascular: S1, S2, RRR  Gastrointestinal: BS+, soft, NT/ND  Extremities: No cyanosis or clubbing. No peripheral edema  Neurological: A/O x 3, no focal deficits  Psychiatric: Normal mood, normal affect  : No CVA tenderness. No crain.   Skin: No rashes  Vascular Access:    LABS:      137  |  95<L>  |  51<H>  ----------------------------<  110<H>  3.2<L>   |  26  |  1.98<H>    Ca    9.1      03 Sep 2017 05:44  Phos  2.5       Mg     1.7         TPro  7.4  /  Alb  3.7  /  TBili  0.8  /  DBili      /  AST  17  /  ALT  10  /  AlkPhos  61      Creatinine Trend: 1.98 <--, 2.26 <--, 2.36 <--, 3.82 <--, 2.89 <--, 1.30 <--, 1.34 <--                        10.1   5.55  )-----------( 310      ( 03 Sep 2017 05:44 )             31.3     Urine Studies:  Urinalysis Basic - ( 02 Sep 2017 21:47 )    Color: PLYEL / Appearance: HAZY / S.012 / pH: 6.0  Gluc: NEGATIVE / Ketone: NEGATIVE  / Bili: NEGATIVE / Urobili: NORMAL E.U.   Blood: NEGATIVE / Protein: NEGATIVE / Nitrite: NEGATIVE   Leuk Esterase: LARGE / RBC:  / WBC 25-50   Sq Epi: OCC / Non Sq Epi:  / Bacteria:           RADIOLOGY & ADDITIONAL STUDIES: HPI:  77 yo F with DM, HTN, CHF, A fib on coumadin sent from rehab center for SHELBY. Pt recently admitted for anemia, SHELBY in setting of CHF, required pRBC transfusion and was discharged to rehab 1 week ago. Labwork done in rehab showed SHELBY so pt was sent to the ED. Pt only c/o foot pain which she has had since prior admission, otherwise feels close to her baseline. She has stable 3 pillow orthopnea, no LE edema, no SOB.     ED VS : 89 /63  102  97.5 16  98%   500 cc bolus given (03 Sep 2017 02:28)      Allergies:  clavulanate (Unknown)  erythromycin (Hives; Rash)      PAST MEDICAL & SURGICAL HISTORY:  Diabetes mellitus  Atrial fibrillation: on coumadin  CHF (congestive heart failure)  Hypertension  No significant past surgical history      Home Medications Reviewed    Hospital Medications:   MEDICATIONS  (STANDING):  atorvastatin 40 milliGRAM(s) Oral at bedtime  ferrous    sulfate 325 milliGRAM(s) Oral daily  pantoprazole    Tablet 40 milliGRAM(s) Oral before breakfast  insulin lispro (HumaLOG) corrective regimen sliding scale   SubCutaneous three times a day before meals  dextrose 5%. 1000 milliLiter(s) (50 mL/Hr) IV Continuous <Continuous>  dextrose 50% Injectable 12.5 Gram(s) IV Push once  dextrose 50% Injectable 25 Gram(s) IV Push once  dextrose 50% Injectable 25 Gram(s) IV Push once      SOCIAL HISTORY:  Denies ETOh, Smoking,     FAMILY HISTORY:  No pertinent family history in first degree relatives      REVIEW OF SYSTEMS:  CONSTITUTIONAL: No weakness, fevers or chills  EYES/ENT: No visual changes;  No vertigo or throat pain   NECK: No pain or stiffness  RESPIRATORY: No cough, wheezing, hemoptysis; No shortness of breath  CARDIOVASCULAR: No chest pain or palpitations.  GASTROINTESTINAL: No abdominal or epigastric pain. No nausea, vomiting, or hematemesis; No diarrhea or constipation. No melena or hematochezia.  GENITOURINARY: No dysuria, frequency, foamy urine, urinary urgency, incontinence or hematuria  NEUROLOGICAL: No numbness or weakness  SKIN: No itching, burning, rashes, or lesions   VASCULAR: No bilateral lower extremity edema.   All other review of systems is negative unless indicated above.      Pt seen and examined at bedside.  No chest pain/no SOB    VITALS:  T(F): 98 (09-03-17 @ 06:36), Max: 98 (17 @ 04:34)  HR: 90 (17 @ 06:36)  BP: 106/64 (17 @ 06:36)  RR: 17 (17 @ 06:36)  SpO2: 98% (17 @ 06:36)  Wt(kg): --     @ 07:01  -   @ 07:00  --------------------------------------------------------  IN: 100 mL / OUT: 0 mL / NET: 100 mL      Height (cm): 167.64 ( @ 04:34)  Weight (kg): 72.8 ( @ 04:34)  BMI (kg/m2): 25.9 (:34)  BSA (m2): 1.82 (:34)    PHYSICAL EXAM:  Constitutional: NAD  HEENT: anicteric sclera, oropharynx clear, MMM  Neck: No JVD  Respiratory: CTAB, no wheezes, rales or rhonchi  Cardiovascular: S1, S2, RRR  Gastrointestinal: BS+, soft, NT/ND  Extremities: No cyanosis or clubbing. No peripheral edema  Neurological: A/O x 3, no focal deficits  Psychiatric: Normal mood, normal affect  : No CVA tenderness. No crain.   Skin: No rashes    LABS:      137  |  95<L>  |  51<H>  ----------------------------<  110<H>  3.2<L>   |  26  |  1.98<H>    Ca    9.1      03 Sep 2017 05:44  Phos  2.5       Mg     1.7         TPro  7.4  /  Alb  3.7  /  TBili  0.8  /  DBili      /  AST  17  /  ALT  10  /  AlkPhos  61      Creatinine Trend: 1.98 <--, 2.26 <--, 2.36 <--, 3.82 <--, 2.89 <--, 1.30 <--, 1.34 <--                        10.1   5.55  )-----------( 310      ( 03 Sep 2017 05:44 )             31.3     Urine Studies:  Urinalysis Basic - ( 02 Sep 2017 21:47 )    Color: PLYEL / Appearance: HAZY / S.012 / pH: 6.0  Gluc: NEGATIVE / Ketone: NEGATIVE  / Bili: NEGATIVE / Urobili: NORMAL E.U.   Blood: NEGATIVE / Protein: NEGATIVE / Nitrite: NEGATIVE   Leuk Esterase: LARGE / RBC:  / WBC 25-50   Sq Epi: OCC / Non Sq Epi:  / Bacteria:           RADIOLOGY & ADDITIONAL STUDIES:

## 2017-09-03 NOTE — H&P ADULT - PROBLEM SELECTOR PLAN 3
- Continue coumadin  - Hold metoprolol for now given BP in 80s - May be slightly hypovolemic from diuresis + poor PO intake   - Monitor volume status closely  - Hold lasix given her SHELBY. Also holding metoprolol for now given BP in 80s   - Cardiology consult in am- Dr Jorge Plummer for medication optimization - May be slightly hypovolemic from diuresis + poor PO intake   - Monitor volume status closely  - Hold lasix given her SHELBY. Also holding metoprolol for now given BP in 80s   - Cardiology consult in am- Dr Jorge Plummer for medication optimization  - Monitor on telemetry

## 2017-09-04 DIAGNOSIS — D72.1 EOSINOPHILIA: ICD-10-CM

## 2017-09-04 LAB
APTT BLD: 108.4 SEC — HIGH (ref 27.5–37.4)
APTT BLD: 117.8 SEC — HIGH (ref 27.5–37.4)
APTT BLD: 72.9 SEC — HIGH (ref 27.5–37.4)
APTT BLD: 85.1 SEC — HIGH (ref 27.5–37.4)
BUN SERPL-MCNC: 38 MG/DL — HIGH (ref 7–23)
CALCIUM SERPL-MCNC: 9.2 MG/DL — SIGNIFICANT CHANGE UP (ref 8.4–10.5)
CHLORIDE SERPL-SCNC: 95 MMOL/L — LOW (ref 98–107)
CO2 SERPL-SCNC: 26 MMOL/L — SIGNIFICANT CHANGE UP (ref 22–31)
CREAT SERPL-MCNC: 1.39 MG/DL — HIGH (ref 0.5–1.3)
GLUCOSE SERPL-MCNC: 121 MG/DL — HIGH (ref 70–99)
HCT VFR BLD CALC: 30.6 % — LOW (ref 34.5–45)
HGB BLD-MCNC: 9.7 G/DL — LOW (ref 11.5–15.5)
INR BLD: 1.62 — HIGH (ref 0.88–1.17)
MCHC RBC-ENTMCNC: 23.5 PG — LOW (ref 27–34)
MCHC RBC-ENTMCNC: 31.7 % — LOW (ref 32–36)
MCV RBC AUTO: 74.3 FL — LOW (ref 80–100)
NRBC # FLD: 0 — SIGNIFICANT CHANGE UP
PLATELET # BLD AUTO: 263 K/UL — SIGNIFICANT CHANGE UP (ref 150–400)
PMV BLD: 9.1 FL — SIGNIFICANT CHANGE UP (ref 7–13)
POTASSIUM SERPL-MCNC: 3.9 MMOL/L — SIGNIFICANT CHANGE UP (ref 3.5–5.3)
POTASSIUM SERPL-SCNC: 3.9 MMOL/L — SIGNIFICANT CHANGE UP (ref 3.5–5.3)
PROTHROM AB SERPL-ACNC: 18.3 SEC — HIGH (ref 9.8–13.1)
RBC # BLD: 4.12 M/UL — SIGNIFICANT CHANGE UP (ref 3.8–5.2)
RBC # FLD: 21.2 % — HIGH (ref 10.3–14.5)
SODIUM SERPL-SCNC: 135 MMOL/L — SIGNIFICANT CHANGE UP (ref 135–145)
SODIUM UR-SCNC: < 20 MEQ/L — SIGNIFICANT CHANGE UP
WBC # BLD: 6.64 K/UL — SIGNIFICANT CHANGE UP (ref 3.8–10.5)
WBC # FLD AUTO: 6.64 K/UL — SIGNIFICANT CHANGE UP (ref 3.8–10.5)

## 2017-09-04 RX ORDER — LANOLIN ALCOHOL/MO/W.PET/CERES
3 CREAM (GRAM) TOPICAL AT BEDTIME
Qty: 0 | Refills: 0 | Status: DISCONTINUED | OUTPATIENT
Start: 2017-09-04 | End: 2017-09-08

## 2017-09-04 RX ORDER — WARFARIN SODIUM 2.5 MG/1
7.5 TABLET ORAL ONCE
Qty: 0 | Refills: 0 | Status: COMPLETED | OUTPATIENT
Start: 2017-09-04 | End: 2017-09-04

## 2017-09-04 RX ADMIN — PANTOPRAZOLE SODIUM 40 MILLIGRAM(S): 20 TABLET, DELAYED RELEASE ORAL at 04:57

## 2017-09-04 RX ADMIN — Medication 25 MILLIGRAM(S): at 04:57

## 2017-09-04 RX ADMIN — HEPARIN SODIUM 900 UNIT(S)/HR: 5000 INJECTION INTRAVENOUS; SUBCUTANEOUS at 10:50

## 2017-09-04 RX ADMIN — HEPARIN SODIUM 900 UNIT(S)/HR: 5000 INJECTION INTRAVENOUS; SUBCUTANEOUS at 18:29

## 2017-09-04 RX ADMIN — Medication 325 MILLIGRAM(S): at 13:34

## 2017-09-04 RX ADMIN — Medication 3 MILLIGRAM(S): at 21:46

## 2017-09-04 RX ADMIN — Medication 25 MILLIGRAM(S): at 18:15

## 2017-09-04 RX ADMIN — ATORVASTATIN CALCIUM 40 MILLIGRAM(S): 80 TABLET, FILM COATED ORAL at 21:46

## 2017-09-04 RX ADMIN — WARFARIN SODIUM 7.5 MILLIGRAM(S): 2.5 TABLET ORAL at 18:15

## 2017-09-04 RX ADMIN — Medication: at 13:34

## 2017-09-04 RX ADMIN — HEPARIN SODIUM 1100 UNIT(S)/HR: 5000 INJECTION INTRAVENOUS; SUBCUTANEOUS at 02:42

## 2017-09-04 NOTE — PROGRESS NOTE ADULT - PROBLEM SELECTOR PLAN 2
Likely prerenal state, possible dehydration  Renal function is improving  Obtain urine sodium to complete SHELBY work up  (+) eosinophilia Likely prerenal state, possible dehydration  Renal function is improving  Monitor renal function  Eosinophils are positive in urine but unlikely she has AIN as there is no history to suggest it and also renal function is improving too fast for AIN.

## 2017-09-05 LAB
APTT BLD: 64.6 SEC — HIGH (ref 27.5–37.4)
BUN SERPL-MCNC: 24 MG/DL — HIGH (ref 7–23)
CALCIUM SERPL-MCNC: 9.2 MG/DL — SIGNIFICANT CHANGE UP (ref 8.4–10.5)
CHLORIDE SERPL-SCNC: 98 MMOL/L — SIGNIFICANT CHANGE UP (ref 98–107)
CO2 SERPL-SCNC: 27 MMOL/L — SIGNIFICANT CHANGE UP (ref 22–31)
CREAT SERPL-MCNC: 1.14 MG/DL — SIGNIFICANT CHANGE UP (ref 0.5–1.3)
GLUCOSE SERPL-MCNC: 121 MG/DL — HIGH (ref 70–99)
HCT VFR BLD CALC: 30.4 % — LOW (ref 34.5–45)
HGB BLD-MCNC: 9.4 G/DL — LOW (ref 11.5–15.5)
INR BLD: 2.27 — HIGH (ref 0.88–1.17)
MCHC RBC-ENTMCNC: 23.2 PG — LOW (ref 27–34)
MCHC RBC-ENTMCNC: 30.9 % — LOW (ref 32–36)
MCV RBC AUTO: 74.9 FL — LOW (ref 80–100)
NRBC # FLD: 0 — SIGNIFICANT CHANGE UP
PLATELET # BLD AUTO: 257 K/UL — SIGNIFICANT CHANGE UP (ref 150–400)
PMV BLD: 9.6 FL — SIGNIFICANT CHANGE UP (ref 7–13)
POTASSIUM SERPL-MCNC: 3.7 MMOL/L — SIGNIFICANT CHANGE UP (ref 3.5–5.3)
POTASSIUM SERPL-SCNC: 3.7 MMOL/L — SIGNIFICANT CHANGE UP (ref 3.5–5.3)
PROTHROM AB SERPL-ACNC: 25.9 SEC — HIGH (ref 9.8–13.1)
RBC # BLD: 4.06 M/UL — SIGNIFICANT CHANGE UP (ref 3.8–5.2)
RBC # FLD: 21.5 % — HIGH (ref 10.3–14.5)
SODIUM SERPL-SCNC: 137 MMOL/L — SIGNIFICANT CHANGE UP (ref 135–145)
WBC # BLD: 5.85 K/UL — SIGNIFICANT CHANGE UP (ref 3.8–10.5)
WBC # FLD AUTO: 5.85 K/UL — SIGNIFICANT CHANGE UP (ref 3.8–10.5)

## 2017-09-05 RX ORDER — FUROSEMIDE 40 MG
20 TABLET ORAL DAILY
Qty: 0 | Refills: 0 | Status: DISCONTINUED | OUTPATIENT
Start: 2017-09-05 | End: 2017-09-05

## 2017-09-05 RX ORDER — FUROSEMIDE 40 MG
20 TABLET ORAL EVERY OTHER DAY
Qty: 0 | Refills: 0 | Status: DISCONTINUED | OUTPATIENT
Start: 2017-09-05 | End: 2017-09-08

## 2017-09-05 RX ORDER — WARFARIN SODIUM 2.5 MG/1
4 TABLET ORAL ONCE
Qty: 0 | Refills: 0 | Status: COMPLETED | OUTPATIENT
Start: 2017-09-05 | End: 2017-09-05

## 2017-09-05 RX ADMIN — Medication 20 MILLIGRAM(S): at 17:30

## 2017-09-05 RX ADMIN — PANTOPRAZOLE SODIUM 40 MILLIGRAM(S): 20 TABLET, DELAYED RELEASE ORAL at 05:25

## 2017-09-05 RX ADMIN — ATORVASTATIN CALCIUM 40 MILLIGRAM(S): 80 TABLET, FILM COATED ORAL at 21:39

## 2017-09-05 RX ADMIN — Medication 2: at 13:22

## 2017-09-05 RX ADMIN — Medication 25 MILLIGRAM(S): at 17:30

## 2017-09-05 RX ADMIN — HEPARIN SODIUM 900 UNIT(S)/HR: 5000 INJECTION INTRAVENOUS; SUBCUTANEOUS at 00:06

## 2017-09-05 RX ADMIN — Medication 3 MILLIGRAM(S): at 21:39

## 2017-09-05 RX ADMIN — Medication 25 MILLIGRAM(S): at 05:25

## 2017-09-05 RX ADMIN — WARFARIN SODIUM 4 MILLIGRAM(S): 2.5 TABLET ORAL at 17:30

## 2017-09-05 RX ADMIN — Medication 325 MILLIGRAM(S): at 13:22

## 2017-09-06 LAB
APTT BLD: 36.4 SEC — SIGNIFICANT CHANGE UP (ref 27.5–37.4)
BUN SERPL-MCNC: 23 MG/DL — SIGNIFICANT CHANGE UP (ref 7–23)
CALCIUM SERPL-MCNC: 9.5 MG/DL — SIGNIFICANT CHANGE UP (ref 8.4–10.5)
CHLORIDE SERPL-SCNC: 101 MMOL/L — SIGNIFICANT CHANGE UP (ref 98–107)
CO2 SERPL-SCNC: 25 MMOL/L — SIGNIFICANT CHANGE UP (ref 22–31)
CREAT SERPL-MCNC: 1.29 MG/DL — SIGNIFICANT CHANGE UP (ref 0.5–1.3)
GLUCOSE SERPL-MCNC: 108 MG/DL — HIGH (ref 70–99)
HCT VFR BLD CALC: 30.3 % — LOW (ref 34.5–45)
HGB BLD-MCNC: 9.4 G/DL — LOW (ref 11.5–15.5)
INR BLD: 2.63 — HIGH (ref 0.88–1.17)
MAGNESIUM SERPL-MCNC: 1.6 MG/DL — SIGNIFICANT CHANGE UP (ref 1.6–2.6)
MCHC RBC-ENTMCNC: 23.3 PG — LOW (ref 27–34)
MCHC RBC-ENTMCNC: 31 % — LOW (ref 32–36)
MCV RBC AUTO: 75 FL — LOW (ref 80–100)
NRBC # FLD: 0 — SIGNIFICANT CHANGE UP
PLATELET # BLD AUTO: 274 K/UL — SIGNIFICANT CHANGE UP (ref 150–400)
PMV BLD: 9.9 FL — SIGNIFICANT CHANGE UP (ref 7–13)
POTASSIUM SERPL-MCNC: 4.6 MMOL/L — SIGNIFICANT CHANGE UP (ref 3.5–5.3)
POTASSIUM SERPL-SCNC: 4.6 MMOL/L — SIGNIFICANT CHANGE UP (ref 3.5–5.3)
PROTHROM AB SERPL-ACNC: 30.1 SEC — HIGH (ref 9.8–13.1)
RBC # BLD: 4.04 M/UL — SIGNIFICANT CHANGE UP (ref 3.8–5.2)
RBC # FLD: 21.7 % — HIGH (ref 10.3–14.5)
SODIUM SERPL-SCNC: 141 MMOL/L — SIGNIFICANT CHANGE UP (ref 135–145)
WBC # BLD: 6.74 K/UL — SIGNIFICANT CHANGE UP (ref 3.8–10.5)
WBC # FLD AUTO: 6.74 K/UL — SIGNIFICANT CHANGE UP (ref 3.8–10.5)

## 2017-09-06 RX ORDER — WARFARIN SODIUM 2.5 MG/1
4 TABLET ORAL ONCE
Qty: 0 | Refills: 0 | Status: COMPLETED | OUTPATIENT
Start: 2017-09-06 | End: 2017-09-06

## 2017-09-06 RX ADMIN — Medication 25 MILLIGRAM(S): at 05:17

## 2017-09-06 RX ADMIN — WARFARIN SODIUM 4 MILLIGRAM(S): 2.5 TABLET ORAL at 17:39

## 2017-09-06 RX ADMIN — Medication 1: at 13:41

## 2017-09-06 RX ADMIN — Medication 325 MILLIGRAM(S): at 12:55

## 2017-09-06 RX ADMIN — Medication 25 MILLIGRAM(S): at 17:39

## 2017-09-06 RX ADMIN — ATORVASTATIN CALCIUM 40 MILLIGRAM(S): 80 TABLET, FILM COATED ORAL at 21:31

## 2017-09-06 RX ADMIN — Medication 20 MILLIGRAM(S): at 12:55

## 2017-09-06 RX ADMIN — PANTOPRAZOLE SODIUM 40 MILLIGRAM(S): 20 TABLET, DELAYED RELEASE ORAL at 05:17

## 2017-09-06 NOTE — PROGRESS NOTE ADULT - PROBLEM SELECTOR PLAN 2
Likely prerenal state, possible dehydration  Renal function has improved  Monitor renal function  Eosinophils are positive in urine but unlikely she has AIN as there is no history to suggest it and also renal function is improving too fast for AIN.

## 2017-09-07 LAB
BUN SERPL-MCNC: 22 MG/DL — SIGNIFICANT CHANGE UP (ref 7–23)
CALCIUM SERPL-MCNC: 9.6 MG/DL — SIGNIFICANT CHANGE UP (ref 8.4–10.5)
CHLORIDE SERPL-SCNC: 100 MMOL/L — SIGNIFICANT CHANGE UP (ref 98–107)
CO2 SERPL-SCNC: 23 MMOL/L — SIGNIFICANT CHANGE UP (ref 22–31)
CREAT SERPL-MCNC: 1.22 MG/DL — SIGNIFICANT CHANGE UP (ref 0.5–1.3)
GLUCOSE SERPL-MCNC: 115 MG/DL — HIGH (ref 70–99)
HCT VFR BLD CALC: 30.1 % — LOW (ref 34.5–45)
HGB BLD-MCNC: 9.1 G/DL — LOW (ref 11.5–15.5)
INR BLD: 2.86 — HIGH (ref 0.88–1.17)
MAGNESIUM SERPL-MCNC: 1.6 MG/DL — SIGNIFICANT CHANGE UP (ref 1.6–2.6)
MCHC RBC-ENTMCNC: 23 PG — LOW (ref 27–34)
MCHC RBC-ENTMCNC: 30.2 % — LOW (ref 32–36)
MCV RBC AUTO: 76.2 FL — LOW (ref 80–100)
NRBC # FLD: 0 — SIGNIFICANT CHANGE UP
PLATELET # BLD AUTO: 128 K/UL — LOW (ref 150–400)
PMV BLD: 11.2 FL — SIGNIFICANT CHANGE UP (ref 7–13)
POTASSIUM SERPL-MCNC: 3.9 MMOL/L — SIGNIFICANT CHANGE UP (ref 3.5–5.3)
POTASSIUM SERPL-SCNC: 3.9 MMOL/L — SIGNIFICANT CHANGE UP (ref 3.5–5.3)
PROTHROM AB SERPL-ACNC: 32.7 SEC — HIGH (ref 9.8–13.1)
RBC # BLD: 3.95 M/UL — SIGNIFICANT CHANGE UP (ref 3.8–5.2)
RBC # FLD: 22 % — HIGH (ref 10.3–14.5)
SODIUM SERPL-SCNC: 138 MMOL/L — SIGNIFICANT CHANGE UP (ref 135–145)
WBC # BLD: 6.28 K/UL — SIGNIFICANT CHANGE UP (ref 3.8–10.5)
WBC # FLD AUTO: 6.28 K/UL — SIGNIFICANT CHANGE UP (ref 3.8–10.5)

## 2017-09-07 RX ORDER — WARFARIN SODIUM 2.5 MG/1
4 TABLET ORAL ONCE
Qty: 0 | Refills: 0 | Status: COMPLETED | OUTPATIENT
Start: 2017-09-07 | End: 2017-09-07

## 2017-09-07 RX ORDER — MAGNESIUM OXIDE 400 MG ORAL TABLET 241.3 MG
400 TABLET ORAL
Qty: 0 | Refills: 0 | Status: DISCONTINUED | OUTPATIENT
Start: 2017-09-07 | End: 2017-09-08

## 2017-09-07 RX ADMIN — PANTOPRAZOLE SODIUM 40 MILLIGRAM(S): 20 TABLET, DELAYED RELEASE ORAL at 05:45

## 2017-09-07 RX ADMIN — ATORVASTATIN CALCIUM 40 MILLIGRAM(S): 80 TABLET, FILM COATED ORAL at 22:00

## 2017-09-07 RX ADMIN — Medication 325 MILLIGRAM(S): at 12:23

## 2017-09-07 RX ADMIN — Medication 25 MILLIGRAM(S): at 05:45

## 2017-09-07 RX ADMIN — Medication 25 MILLIGRAM(S): at 17:07

## 2017-09-07 RX ADMIN — Medication 1: at 17:07

## 2017-09-07 RX ADMIN — MAGNESIUM OXIDE 400 MG ORAL TABLET 400 MILLIGRAM(S): 241.3 TABLET ORAL at 17:07

## 2017-09-07 RX ADMIN — WARFARIN SODIUM 4 MILLIGRAM(S): 2.5 TABLET ORAL at 17:07

## 2017-09-07 NOTE — PROGRESS NOTE ADULT - PROVIDER SPECIALTY LIST ADULT
Cardiology
Cardiology
Internal Medicine
Nephrology
Internal Medicine

## 2017-09-07 NOTE — PROGRESS NOTE ADULT - PROBLEM SELECTOR PLAN 4
- rate controlled  subtherapeutic INR - HEPARIN +COUMADIN
- Continue coumadin
- Continue coumadin  - Hold metoprolol for now given BP in 80s
- rate controlled  subtherapeutic INR - HEPARIN +COUMADIN
- rate controlled  subtherapeutic INR - HEPARIN +COUMADIN
- rate controlled  therapeutic INR - waiting for rehab
Improved

## 2017-09-07 NOTE — PROGRESS NOTE ADULT - PROBLEM SELECTOR PROBLEM 2
SHELBY (acute kidney injury)
Hypotension, unspecified hypotension type
SHELBY (acute kidney injury)

## 2017-09-07 NOTE — PROGRESS NOTE ADULT - PROBLEM SELECTOR PROBLEM 4
Hypokalemia
Chronic atrial fibrillation
Hypokalemia
Chronic atrial fibrillation

## 2017-09-07 NOTE — PROGRESS NOTE ADULT - PROBLEM SELECTOR PLAN 1
- Likely prerenal/ med related vs ATN in setting of hypotension and urinary sodium >20  -In resolution, continue diet
- Likely prerenal/ med related vs ATN in setting of hypotension.   - Hold lasix  - Renal consult
- lasix on hold, arf slowly improving   pt appears euvolemic
Optimal

## 2017-09-07 NOTE — PROGRESS NOTE ADULT - PROBLEM SELECTOR PROBLEM 5
Eosinophilia
Type 2 diabetes mellitus without complication, without long-term current use of insulin

## 2017-09-07 NOTE — PROGRESS NOTE ADULT - PROBLEM SELECTOR PROBLEM 3
CHF (congestive heart failure)
Chronic systolic congestive heart failure

## 2017-09-07 NOTE — PROGRESS NOTE ADULT - PROBLEM SELECTOR PLAN 3
- May be slightly hypovolemic from diuresis + poor PO intake   - Monitor volume status closely
- May be slightly hypovolemic from diuresis + poor PO intake   - Monitor volume status closely  - Hold lasix given her SHELBY. Also holding metoprolol for now given BP in 80s   - Monitor on telemetry
- Monitor volume status closely
- Monitor volume status closely , diuresis on hold
Clinically euvolemic.   Continue diuretic every other day. monitor renal function
Clinically euvolemic.   Continue diuretic every other day. monitor renal function
Clinically euvolemic.   Restarted on diuretic, monitor renal function
Clinically euvolemic.   Restart diuretic if clinically needed

## 2017-09-07 NOTE — PROGRESS NOTE ADULT - SUBJECTIVE AND OBJECTIVE BOX
pt was evaluated by hospitalist earlier during the day, labs, vitals, consults reviewed
Dr. Lazcano  Office [phone]  Cell [phone]  Elle VINCENT  Cell [phone]      Patient is a 76y old  Female who presents with a chief complaint of SHELBY (03 Sep 2017 02:28)      Patient seen and examined at bedside. No chest pain/sob    VITALS:  T(F): 97.9 (09-05-17 @ 21:37), Max: 97.9 (09-05-17 @ 05:24)  HR: 81 (09-05-17 @ 21:37)  BP: 121/67 (09-05-17 @ 21:37)  RR: 17 (09-05-17 @ 21:37)  SpO2: 100% (09-05-17 @ 21:37)  Wt(kg): --    09-04 @ 07:01  -  09-05 @ 07:00  --------------------------------------------------------  IN: 415 mL / OUT: 620 mL / NET: -205 mL    09-05 @ 07:01  -  09-05 @ 22:03  --------------------------------------------------------  IN: 696 mL / OUT: 200 mL / NET: 496 mL          PHYSICAL EXAM:  Constitutional: NAD  Neck: No JVD  Respiratory: CTAB, no wheezes, rales or rhonchi  Cardiovascular: S1, S2, RRR  Gastrointestinal: BS+, soft, NT/ND  Extremities: No peripheral edema    Hospital Medications:   MEDICATIONS  (STANDING):  atorvastatin 40 milliGRAM(s) Oral at bedtime  ferrous    sulfate 325 milliGRAM(s) Oral daily  pantoprazole    Tablet 40 milliGRAM(s) Oral before breakfast  insulin lispro (HumaLOG) corrective regimen sliding scale   SubCutaneous three times a day before meals  dextrose 5%. 1000 milliLiter(s) (50 mL/Hr) IV Continuous <Continuous>  dextrose 50% Injectable 12.5 Gram(s) IV Push once  dextrose 50% Injectable 25 Gram(s) IV Push once  dextrose 50% Injectable 25 Gram(s) IV Push once  metoprolol 25 milliGRAM(s) Oral two times a day  furosemide    Tablet 20 milliGRAM(s) Oral every other day      LABS:  09-05    137  |  98  |  24<H>  ----------------------------<  121<H>  3.7   |  27  |  1.14    Ca    9.2      05 Sep 2017 06:36      Creatinine Trend: 1.14 <--, 1.39 <--, 1.98 <--, 2.26 <--, 2.36 <--, 3.82 <--, 2.89 <--                                9.4    5.85  )-----------( 257      ( 05 Sep 2017 06:36 )             30.4     Urine Studies:  Urinalysis - [09-02-17 @ 21:47]      Color PLYEL / Appearance HAZY / SG 1.012 / pH 6.0      Gluc NEGATIVE / Ketone NEGATIVE  / Bili NEGATIVE / Urobili NORMAL       Blood NEGATIVE / Protein NEGATIVE / Leuk Est LARGE / Nitrite NEGATIVE      RBC  / WBC 25-50 / Hyaline 2-5 / Gran  / Sq Epi OCC / Non Sq Epi  / Bacteria     Urine Creatinine 88.02      [09-03-17 @ 16:45]  Urine Sodium < 20      [09-04-17 @ 19:24]  Urine Urea Nitrogen 878.5      [09-03-17 @ 16:45]    Iron 35, TIBC 358, %sat 10      [07-31-17 @ 06:37]  Ferritin 56.0      [07-31-17 @ 10:21]  Vitamin D (25OH) 17.3      [07-31-17 @ 10:21]  HbA1c 6.4      [07-31-17 @ 10:21]  TSH 1.10      [08-04-17 @ 03:50]  Lipid: chol 107, TG 60, HDL 40, LDL 59      [08-04-17 @ 03:50]        RADIOLOGY & ADDITIONAL STUDIES:
Patient seen and examined at bedside. No chest pain/sob    VITALS:  T(F): 97.8 (09-04-17 @ 04:55), Max: 98.2 (09-03-17 @ 15:30)  HR: 98 (09-04-17 @ 04:55)  BP: 104/67 (09-04-17 @ 04:55)  RR: 18 (09-04-17 @ 04:55)  SpO2: 100% (09-04-17 @ 04:55)  Wt(kg): --    09-03 @ 07:01  -  09-04 @ 07:00  --------------------------------------------------------  IN: 429 mL / OUT: 1050 mL / NET: -621 mL          PHYSICAL EXAM:  Constitutional: NAD  Neck: No JVD  Respiratory: CTAB, no wheezes, rales or rhonchi  Cardiovascular: S1, S2, RRR  Gastrointestinal: BS+, soft, NT/ND  Extremities: No peripheral edema    Hospital Medications:   MEDICATIONS  (STANDING):  atorvastatin 40 milliGRAM(s) Oral at bedtime  ferrous    sulfate 325 milliGRAM(s) Oral daily  pantoprazole    Tablet 40 milliGRAM(s) Oral before breakfast  insulin lispro (HumaLOG) corrective regimen sliding scale   SubCutaneous three times a day before meals  dextrose 5%. 1000 milliLiter(s) (50 mL/Hr) IV Continuous <Continuous>  dextrose 50% Injectable 12.5 Gram(s) IV Push once  dextrose 50% Injectable 25 Gram(s) IV Push once  dextrose 50% Injectable 25 Gram(s) IV Push once  metoprolol 25 milliGRAM(s) Oral two times a day  heparin  Infusion.  Unit(s)/Hr (13 mL/Hr) IV Continuous <Continuous>  warfarin 7.5 milliGRAM(s) Oral once      LABS:  09-04    135  |  95<L>  |  38<H>  ----------------------------<  121<H>  3.9   |  26  |  1.39<H>    Ca    9.2      04 Sep 2017 02:10  Phos  2.5     09-03  Mg     1.7     09-03    TPro  7.4  /  Alb  3.7  /  TBili  0.8  /  DBili      /  AST  17  /  ALT  10  /  AlkPhos  61  09-03    Creatinine Trend: 1.39 <--, 1.98 <--, 2.26 <--, 2.36 <--, 3.82 <--, 2.89 <--, 1.30 <--                              9.7    6.64  )-----------( 263      ( 04 Sep 2017 02:10 )             30.6     Urine Studies:  Creatinine, Random Urine: 88.02 mg/dL (09-03 @ 16:45)    Creatinine, Random Urine: 88.02 mg/dL (09-03 @ 16:45)    Urinalysis - [09-02-17 @ 21:47]      Color PLYEL / Appearance HAZY / SG 1.012 / pH 6.0      Gluc NEGATIVE / Ketone NEGATIVE  / Bili NEGATIVE / Urobili NORMAL       Blood NEGATIVE / Protein NEGATIVE / Leuk Est LARGE / Nitrite NEGATIVE      RBC  / WBC 25-50 / Hyaline 2-5 / Gran  / Sq Epi OCC / Non Sq Epi  / Bacteria     Urine Creatinine 88.02      [09-03-17 @ 16:45]  Urine Urea Nitrogen 878.5      [09-03-17 @ 16:45]    Iron 35, TIBC 358, %sat 10      [07-31-17 @ 06:37]  Ferritin 56.0      [07-31-17 @ 10:21]  Vitamin D (25OH) 17.3      [07-31-17 @ 10:21]  HbA1c 6.4      [07-31-17 @ 10:21]  TSH 1.10      [08-04-17 @ 03:50]  Lipid: chol 107, TG 60, HDL 40, LDL 59      [08-04-17 @ 03:50]        RADIOLOGY & ADDITIONAL STUDIES:
Patient seen and examined at bedside. No chest pain/sob    VITALS:  T(F): 98.2 (09-06-17 @ 05:16), Max: 98.2 (09-06-17 @ 05:16)  HR: 98 (09-06-17 @ 05:16)  BP: 101/63 (09-06-17 @ 05:16)  RR: 18 (09-06-17 @ 05:16)  SpO2: 100% (09-06-17 @ 05:16)  Wt(kg): --    09-05 @ 07:01  -  09-06 @ 07:00  --------------------------------------------------------  IN: 796 mL / OUT: 200 mL / NET: 596 mL    09-06 @ 07:01  -  09-06 @ 10:05  --------------------------------------------------------  IN: 360 mL / OUT: 0 mL / NET: 360 mL          PHYSICAL EXAM:  Constitutional: NAD  Neck: No JVD  Respiratory: CTAB, no wheezes, rales or rhonchi  Cardiovascular: S1, S2, RRR  Gastrointestinal: BS+, soft, NT/ND  Extremities: No peripheral edema    Hospital Medications:   MEDICATIONS  (STANDING):  atorvastatin 40 milliGRAM(s) Oral at bedtime  ferrous    sulfate 325 milliGRAM(s) Oral daily  pantoprazole    Tablet 40 milliGRAM(s) Oral before breakfast  insulin lispro (HumaLOG) corrective regimen sliding scale   SubCutaneous three times a day before meals  dextrose 5%. 1000 milliLiter(s) (50 mL/Hr) IV Continuous <Continuous>  dextrose 50% Injectable 12.5 Gram(s) IV Push once  dextrose 50% Injectable 25 Gram(s) IV Push once  dextrose 50% Injectable 25 Gram(s) IV Push once  metoprolol 25 milliGRAM(s) Oral two times a day  furosemide    Tablet 20 milliGRAM(s) Oral every other day      LABS:  09-06    141  |  101  |  23  ----------------------------<  108<H>  4.6   |  25  |  1.29    Ca    9.5      06 Sep 2017 05:14  Mg     1.6     09-06      Creatinine Trend: 1.29 <--, 1.14 <--, 1.39 <--, 1.98 <--, 2.26 <--, 2.36 <--, 3.82 <--, 2.89 <--                              9.4    6.74  )-----------( 274      ( 06 Sep 2017 05:14 )             30.3     Urine Studies:  Sodium, Random Urine: < 20 meq/L (09-04 @ 19:24)  Creatinine, Random Urine: 88.02 mg/dL (09-03 @ 16:45)    Sodium, Random Urine: < 20 meq/L (09-04 @ 19:24)  Creatinine, Random Urine: 88.02 mg/dL (09-03 @ 16:45)    Urinalysis - [09-02-17 @ 21:47]      Color PLYEL / Appearance HAZY / SG 1.012 / pH 6.0      Gluc NEGATIVE / Ketone NEGATIVE  / Bili NEGATIVE / Urobili NORMAL       Blood NEGATIVE / Protein NEGATIVE / Leuk Est LARGE / Nitrite NEGATIVE      RBC  / WBC 25-50 / Hyaline 2-5 / Gran  / Sq Epi OCC / Non Sq Epi  / Bacteria     Urine Creatinine 88.02      [09-03-17 @ 16:45]  Urine Sodium < 20      [09-04-17 @ 19:24]  Urine Urea Nitrogen 878.5      [09-03-17 @ 16:45]    Iron 35, TIBC 358, %sat 10      [07-31-17 @ 06:37]  Ferritin 56.0      [07-31-17 @ 10:21]  Vitamin D (25OH) 17.3      [07-31-17 @ 10:21]  HbA1c 6.4      [07-31-17 @ 10:21]  TSH 1.10      [08-04-17 @ 03:50]  Lipid: chol 107, TG 60, HDL 40, LDL 59      [08-04-17 @ 03:50]        RADIOLOGY & ADDITIONAL STUDIES:
Patient seen and examined at bedside. No chest pain/sob    VITALS:  T(F): 98.4 (09-07-17 @ 05:44), Max: 98.4 (09-07-17 @ 05:44)  HR: 96 (09-07-17 @ 05:44)  BP: 101/67 (09-07-17 @ 05:44)  RR: 17 (09-07-17 @ 05:44)  SpO2: 97% (09-07-17 @ 05:44)  Wt(kg): --    09-06 @ 07:01  -  09-07 @ 07:00  --------------------------------------------------------  IN: 760 mL / OUT: 180 mL / NET: 580 mL          PHYSICAL EXAM:  Constitutional: NAD  Neck: No JVD  Respiratory: CTAB, no wheezes, rales or rhonchi  Cardiovascular: S1, S2, RRR  Gastrointestinal: BS+, soft, NT/ND  Extremities: No peripheral edema    Hospital Medications:   MEDICATIONS  (STANDING):  atorvastatin 40 milliGRAM(s) Oral at bedtime  ferrous    sulfate 325 milliGRAM(s) Oral daily  pantoprazole    Tablet 40 milliGRAM(s) Oral before breakfast  insulin lispro (HumaLOG) corrective regimen sliding scale   SubCutaneous three times a day before meals  dextrose 5%. 1000 milliLiter(s) (50 mL/Hr) IV Continuous <Continuous>  dextrose 50% Injectable 12.5 Gram(s) IV Push once  dextrose 50% Injectable 25 Gram(s) IV Push once  dextrose 50% Injectable 25 Gram(s) IV Push once  metoprolol 25 milliGRAM(s) Oral two times a day  furosemide    Tablet 20 milliGRAM(s) Oral every other day      LABS:  09-06    141  |  101  |  23  ----------------------------<  108<H>  4.6   |  25  |  1.29    Ca    9.5      06 Sep 2017 05:14  Mg     1.6     09-06      Creatinine Trend: 1.29 <--, 1.14 <--, 1.39 <--, 1.98 <--, 2.26 <--, 2.36 <--, 3.82 <--                              9.4    6.74  )-----------( 274      ( 06 Sep 2017 05:14 )             30.3     Urine Studies:  Sodium, Random Urine: < 20 meq/L (09-04 @ 19:24)  Creatinine, Random Urine: 88.02 mg/dL (09-03 @ 16:45)    Sodium, Random Urine: < 20 meq/L (09-04 @ 19:24)  Creatinine, Random Urine: 88.02 mg/dL (09-03 @ 16:45)    Urinalysis - [09-02-17 @ 21:47]      Color PLYEL / Appearance HAZY / SG 1.012 / pH 6.0      Gluc NEGATIVE / Ketone NEGATIVE  / Bili NEGATIVE / Urobili NORMAL       Blood NEGATIVE / Protein NEGATIVE / Leuk Est LARGE / Nitrite NEGATIVE      RBC  / WBC 25-50 / Hyaline 2-5 / Gran  / Sq Epi OCC / Non Sq Epi  / Bacteria     Urine Creatinine 88.02      [09-03-17 @ 16:45]  Urine Sodium < 20      [09-04-17 @ 19:24]  Urine Urea Nitrogen 878.5      [09-03-17 @ 16:45]    Iron 35, TIBC 358, %sat 10      [07-31-17 @ 06:37]  Ferritin 56.0      [07-31-17 @ 10:21]  Vitamin D (25OH) 17.3      [07-31-17 @ 10:21]  HbA1c 6.4      [07-31-17 @ 10:21]  TSH 1.10      [08-04-17 @ 03:50]  Lipid: chol 107, TG 60, HDL 40, LDL 59      [08-04-17 @ 03:50]        RADIOLOGY & ADDITIONAL STUDIES:
SUBJ:  75 yo F with DM, HTN, CHF, A fib on coumadin sent from rehab center for SHELBY. Pt recently admitted for anemia, SHELBY in setting of CHF, required pRBC transfusion and was discharged to rehab 1 week ago. Labwork done in rehab showed SHELBY so pt was sent to the ED.    MEDICATIONS  (STANDING):  atorvastatin 40 milliGRAM(s) Oral at bedtime  ferrous    sulfate 325 milliGRAM(s) Oral daily  pantoprazole    Tablet 40 milliGRAM(s) Oral before breakfast  insulin lispro (HumaLOG) corrective regimen sliding scale   SubCutaneous three times a day before meals  dextrose 5%. 1000 milliLiter(s) (50 mL/Hr) IV Continuous <Continuous>  dextrose 50% Injectable 12.5 Gram(s) IV Push once  dextrose 50% Injectable 25 Gram(s) IV Push once  dextrose 50% Injectable 25 Gram(s) IV Push once  metoprolol 25 milliGRAM(s) Oral two times a day  warfarin 4 milliGRAM(s) Oral once  furosemide    Tablet 20 milliGRAM(s) Oral daily    MEDICATIONS  (PRN):  acetaminophen   Tablet 650 milliGRAM(s) Oral every 6 hours PRN mild, moderate pain  ondansetron    Tablet 4 milliGRAM(s) Oral every 6 hours PRN Nausea and/or Vomiting  docusate sodium 100 milliGRAM(s) Oral two times a day PRN Constipation  senna 2 Tablet(s) Oral at bedtime PRN Constipation  polyethylene glycol 3350 17 Gram(s) Oral daily PRN Constipation  sodium chloride 0.65% Nasal 1 Spray(s) Both Nostrils three times a day PRN Nasal Congestion  simethicone 80 milliGRAM(s) Chew every 6 hours PRN Gas  dextrose Gel 1 Dose(s) Oral once PRN Blood Glucose LESS THAN 70 milliGRAM(s)/deciliter  glucagon  Injectable 1 milliGRAM(s) IntraMuscular once PRN Glucose LESS THAN 70 milligrams/deciliter  melatonin 3 milliGRAM(s) Oral at bedtime PRN Insomnia            Vital Signs Last 24 Hrs  T(C): 36.1 (05 Sep 2017 13:21), Max: 36.7 (04 Sep 2017 14:40)  T(F): 96.9 (05 Sep 2017 13:21), Max: 98.1 (04 Sep 2017 14:40)  HR: 54 (05 Sep 2017 13:21) (54 - 98)  BP: 130/73 (05 Sep 2017 13:21) (101/64 - 130/73)  BP(mean): --  RR: 18 (05 Sep 2017 13:21) (17 - 18)  SpO2: 100% (05 Sep 2017 13:21) (98% - 100%)    REVIEW OF SYSTEMS:  CONSTITUTIONAL: No fever, weight loss, or fatigue  EYES: No eye pain, visual disturbances, or discharge  ENMT:  No difficulty hearing, tinnitus, vertigo; No sinus or throat pain  NECK: No pain or stiffness  RESPIRATORY: No cough, wheezing, chills or hemoptysis; No shortness of breath  CARDIOVASCULAR: No chest pain, palpitations, dizziness, or leg swelling  GASTROINTESTINAL: No abdominal or epigastric pain. No nausea, vomiting, or hematemesis; No diarrhea or constipation. No melena or hematochezia.  GENITOURINARY: No dysuria, frequency, hematuria, or incontinence  NEUROLOGICAL: No headaches, memory loss, loss of strength, numbness, or tremors  SKIN: No itching, burning, rashes, or lesions   LYMPH NODES: No enlarged glands  ENDOCRINE: No heat or cold intolerance; No hair loss  MUSCULOSKELETAL: No joint pain or swelling; No muscle, back, or extremity pain  PSYCHIATRIC: No depression, anxiety, mood swings, or difficulty sleeping  HEME/LYMPH: No easy bruising, or bleeding gums  ALLERY AND IMMUNOLOGIC: No hives or eczema    PHYSICAL EXAM:  · CONSTITUTIONAL:	Well-developed, well nourished    BMI-  · EYES:	EOMI; PERRL; no drainage or redness  · ENMT	No oral lesions; no gross abnormalities  · NECK:	No bruits; no thyromegaly or nodules  ·BACK:	No deformity or limitation of movement  ·RESPIRATORY:   airway patent; breath sounds equal; good air movement; respirations non-labored; clear to auscultation bilaterally; no chest wall tenderness; no intercostal retractions; no rales,rhonchi or wheeze  · CARDIOVASCULAR	regular rate and rhythm  no rub  no murmur  normal PMI  . GASTROINTESTINAL:  no distention; no masses palpable; bowel sounds normal; no rebound tenderness; no guarding; no rigidity; no organomegaly  · EXTREMITIES: No cyanosis, clubbing or edema  · VASCULAR: 	Equal and normal pulses (carotid, femoral, dorsalis pedis)  ·NEUROLOGICAL:   alert and oriented x 3; sensation intact; deep reflexes intact; cranial nerves intact; no spontaneous movement; superficial reflexes intact; normal strength  · SKIN:	No lesions; no rash  . LYMPH NODES:	No lymphadedenopathy  · MUSCULOSKELETAL:   No calf tenderness  no joint swelling	  TELEMETRY:    ECG: No new EKG    TTE: No new TTEs    LABS:                        9.4    5.85  )-----------( 257      ( 05 Sep 2017 06:36 )             30.4     09-05    137  |  98  |  24<H>  ----------------------------<  121<H>  3.7   |  27  |  1.14    Ca    9.2      05 Sep 2017 06:36          PT/INR - ( 05 Sep 2017 06:36 )   PT: 25.9 SEC;   INR: 2.27          PTT - ( 05 Sep 2017 06:36 )  PTT:64.6 SEC  Creatinine Trend: 1.14<--, 1.39<--, 1.98<--, 2.26<--, 2.36<--, 3.82<--  I&O's Summary    04 Sep 2017 07:01  -  05 Sep 2017 07:00  --------------------------------------------------------  IN: 415 mL / OUT: 620 mL / NET: -205 mL    05 Sep 2017 07:01  -  05 Sep 2017 14:24  --------------------------------------------------------  IN: 489 mL / OUT: 0 mL / NET: 489 mL      BNP  RADIOLOGY & ADDITIONAL STUDIES:    IMPRESSION AND PLAN:
SUBJ:  77 yo F with DM, HTN, CHF, A fib on coumadin sent from rehab center for SHELBY. Pt recently admitted for anemia, SHELBY in setting of CHF, required pRBC transfusion and was discharged to rehab 1 week ago. Labwork done in rehab showed SHELBY so pt was sent to the ED. No current SOB, Chest pain, edema or complaints. Creatinine trend descending.    MEDICATIONS  (STANDING):  atorvastatin 40 milliGRAM(s) Oral at bedtime  ferrous    sulfate 325 milliGRAM(s) Oral daily  pantoprazole    Tablet 40 milliGRAM(s) Oral before breakfast  insulin lispro (HumaLOG) corrective regimen sliding scale   SubCutaneous three times a day before meals  dextrose 5%. 1000 milliLiter(s) (50 mL/Hr) IV Continuous <Continuous>  dextrose 50% Injectable 12.5 Gram(s) IV Push once  dextrose 50% Injectable 25 Gram(s) IV Push once  dextrose 50% Injectable 25 Gram(s) IV Push once  metoprolol 25 milliGRAM(s) Oral two times a day  furosemide    Tablet 20 milliGRAM(s) Oral every other day    MEDICATIONS  (PRN):  acetaminophen   Tablet 650 milliGRAM(s) Oral every 6 hours PRN mild, moderate pain  ondansetron    Tablet 4 milliGRAM(s) Oral every 6 hours PRN Nausea and/or Vomiting  docusate sodium 100 milliGRAM(s) Oral two times a day PRN Constipation  senna 2 Tablet(s) Oral at bedtime PRN Constipation  polyethylene glycol 3350 17 Gram(s) Oral daily PRN Constipation  sodium chloride 0.65% Nasal 1 Spray(s) Both Nostrils three times a day PRN Nasal Congestion  simethicone 80 milliGRAM(s) Chew every 6 hours PRN Gas  dextrose Gel 1 Dose(s) Oral once PRN Blood Glucose LESS THAN 70 milliGRAM(s)/deciliter  glucagon  Injectable 1 milliGRAM(s) IntraMuscular once PRN Glucose LESS THAN 70 milligrams/deciliter  melatonin 3 milliGRAM(s) Oral at bedtime PRN Insomnia            Vital Signs Last 24 Hrs  T(C): 35.6 (07 Sep 2017 13:25), Max: 36.9 (07 Sep 2017 05:44)  T(F): 96 (07 Sep 2017 13:25), Max: 98.4 (07 Sep 2017 05:44)  HR: 81 (07 Sep 2017 13:25) (81 - 96)  BP: 108/77 (07 Sep 2017 13:25) (100/57 - 110/80)  BP(mean): --  RR: 18 (07 Sep 2017 13:25) (17 - 18)  SpO2: 97% (07 Sep 2017 05:44) (97% - 99%)    REVIEW OF SYSTEMS:  CONSTITUTIONAL: No fever, weight loss, or fatigue  EYES: No eye pain, visual disturbances, or discharge  ENMT:  No difficulty hearing, tinnitus, vertigo; No sinus or throat pain  NECK: No pain or stiffness  RESPIRATORY: No cough, wheezing, chills or hemoptysis; No shortness of breath  CARDIOVASCULAR: No chest pain, palpitations, dizziness, or leg swelling  GASTROINTESTINAL: No abdominal or epigastric pain. No nausea, vomiting, or hematemesis; No diarrhea or constipation. No melena or hematochezia.  GENITOURINARY: No dysuria, frequency, hematuria, or incontinence  NEUROLOGICAL: No headaches, memory loss, loss of strength, numbness, or tremors  SKIN: No itching, burning, rashes, or lesions   LYMPH NODES: No enlarged glands  ENDOCRINE: No heat or cold intolerance; No hair loss  MUSCULOSKELETAL: No joint pain or swelling; No muscle, back, or extremity pain  PSYCHIATRIC: No depression, anxiety, mood swings, or difficulty sleeping  HEME/LYMPH: No easy bruising, or bleeding gums  ALLERY AND IMMUNOLOGIC: No hives or eczema    PHYSICAL EXAM:  · CONSTITUTIONAL:	Well-developed, well nourished    BMI-  · EYES:	EOMI; PERRL; no drainage or redness  · ENMT	No oral lesions; no gross abnormalities  · NECK:	No bruits; no thyromegaly or nodules  ·BACK:	No deformity or limitation of movement  ·RESPIRATORY:   airway patent; breath sounds equal; good air movement; respirations non-labored; clear to auscultation bilaterally; no chest wall tenderness; no intercostal retractions; no rales,rhonchi or wheeze  · CARDIOVASCULAR	regular rate and rhythm  no rub  no murmur  normal PMI  . GASTROINTESTINAL:  no distention; no masses palpable; bowel sounds normal; no rebound tenderness; no guarding; no rigidity; no organomegaly  · EXTREMITIES: No cyanosis, clubbing or edema  · VASCULAR: 	Equal and normal pulses (carotid, femoral, dorsalis pedis)  ·NEUROLOGICAL:   alert and oriented x 3; sensation intact; deep reflexes intact; cranial nerves intact; no spontaneous movement; superficial reflexes intact; normal strength  · SKIN:	No lesions; no rash  . LYMPH NODES:	No lymphadedenopathy  · MUSCULOSKELETAL:   No calf tenderness  no joint swelling	  TELEMETRY:    ECG:    TTE:    LABS:                        9.1    6.28  )-----------( 128      ( 07 Sep 2017 06:21 )             30.1     09-07    138  |  100  |  22  ----------------------------<  115<H>  3.9   |  23  |  1.22    Ca    9.6      07 Sep 2017 06:21  Mg     1.6     09-07          PT/INR - ( 07 Sep 2017 09:36 )   PT: 32.7 SEC;   INR: 2.86          PTT - ( 06 Sep 2017 05:14 )  PTT:36.4 SEC  Creatinine Trend: 1.22<--, 1.29<--, 1.14<--, 1.39<--, 1.98<--, 2.26<--  I&O's Summary    06 Sep 2017 07:01  -  07 Sep 2017 07:00  --------------------------------------------------------  IN: 760 mL / OUT: 180 mL / NET: 580 mL    07 Sep 2017 07:01  -  07 Sep 2017 13:47  --------------------------------------------------------  IN: 420 mL / OUT: 0 mL / NET: 420 mL      BNP  RADIOLOGY & ADDITIONAL STUDIES:    IMPRESSION AND PLAN:
chief complaint : admitted for ARF       SUBJECTIVE / OVERNIGHT EVENTS: pt syas she feels much better, denies chest pain, shortness of breath, nausea, vomiting       MEDICATIONS  (STANDING):  atorvastatin 40 milliGRAM(s) Oral at bedtime  ferrous    sulfate 325 milliGRAM(s) Oral daily  pantoprazole    Tablet 40 milliGRAM(s) Oral before breakfast  insulin lispro (HumaLOG) corrective regimen sliding scale   SubCutaneous three times a day before meals  dextrose 5%. 1000 milliLiter(s) (50 mL/Hr) IV Continuous <Continuous>  dextrose 50% Injectable 12.5 Gram(s) IV Push once  dextrose 50% Injectable 25 Gram(s) IV Push once  dextrose 50% Injectable 25 Gram(s) IV Push once  metoprolol 25 milliGRAM(s) Oral two times a day  heparin  Infusion.  Unit(s)/Hr (13 mL/Hr) IV Continuous <Continuous>    MEDICATIONS  (PRN):  acetaminophen   Tablet 650 milliGRAM(s) Oral every 6 hours PRN mild, moderate pain  ondansetron    Tablet 4 milliGRAM(s) Oral every 6 hours PRN Nausea and/or Vomiting  docusate sodium 100 milliGRAM(s) Oral two times a day PRN Constipation  senna 2 Tablet(s) Oral at bedtime PRN Constipation  polyethylene glycol 3350 17 Gram(s) Oral daily PRN Constipation  sodium chloride 0.65% Nasal 1 Spray(s) Both Nostrils three times a day PRN Nasal Congestion  simethicone 80 milliGRAM(s) Chew every 6 hours PRN Gas  dextrose Gel 1 Dose(s) Oral once PRN Blood Glucose LESS THAN 70 milliGRAM(s)/deciliter  glucagon  Injectable 1 milliGRAM(s) IntraMuscular once PRN Glucose LESS THAN 70 milligrams/deciliter  heparin  Injectable 6000 Unit(s) IV Push every 6 hours PRN For aPTT less than 40  heparin  Injectable 3000 Unit(s) IV Push every 6 hours PRN For aPTT between 40 - 57  melatonin 3 milliGRAM(s) Oral at bedtime PRN Insomnia        CAPILLARY BLOOD GLUCOSE  133 (04 Sep 2017 17:54)  194 (04 Sep 2017 12:10)  139 (04 Sep 2017 08:28)  162 (03 Sep 2017 21:47)        I&O's Summary    03 Sep 2017 07:01  -  04 Sep 2017 07:00  --------------------------------------------------------  IN: 429 mL / OUT: 1050 mL / NET: -621 mL    04 Sep 2017 07:01  -  04 Sep 2017 21:24  --------------------------------------------------------  IN: 234 mL / OUT: 170 mL / NET: 64 mL        Constitutional: No fever, fatigue  Skin: No rash.  Eyes: No recent vision problems or eye pain.  ENT: No congestion, ear pain, or sore throat.  Cardiovascular: No chest pain or palpation.  Respiratory: No cough, shortness of breath, congestion, or wheezing.  Gastrointestinal: No abdominal pain, nausea, vomiting, or diarrhea.  Genitourinary: No dysuria.  Musculoskeletal: No joint swelling.  Neurologic: No headache.    PHYSICAL EXAM:  GENERAL: NAD  EYES: EOMI, PERRLA  NECK: Supple, No JVD  CHEST/LUNG: dec breath sounds at bases   HEART:  S1 , S2 +  ABDOMEN: soft, bs+  EXTREMITIES:  trace edema  NEUROLOGY: alert awake oriented       LABS:                        9.7    6.64  )-----------( 263      ( 04 Sep 2017 02:10 )             30.6     09-04    135  |  95<L>  |  38<H>  ----------------------------<  121<H>  3.9   |  26  |  1.39<H>    Ca    9.2      04 Sep 2017 02:10  Phos  2.5     09-03  Mg     1.7     -03    TPro  7.4  /  Alb  3.7  /  TBili  0.8  /  DBili  x   /  AST  17  /  ALT  10  /  AlkPhos  61  09-03    PT/INR - ( 04 Sep 2017 02:10 )   PT: 18.3 SEC;   INR: 1.62          PTT - ( 04 Sep 2017 17:30 )  PTT:72.9 SEC      Urinalysis Basic - ( 02 Sep 2017 21:47 )    Color: PLYEL / Appearance: HAZY / S.012 / pH: 6.0  Gluc: NEGATIVE / Ketone: NEGATIVE  / Bili: NEGATIVE / Urobili: NORMAL E.U.   Blood: NEGATIVE / Protein: NEGATIVE / Nitrite: NEGATIVE   Leuk Esterase: LARGE / RBC: x / WBC 25-50   Sq Epi: OCC / Non Sq Epi: x / Bacteria: x        RADIOLOGY & ADDITIONAL TESTS:    Imaging Personally Reviewed:    Consultant(s) Notes Reviewed:      Care Discussed with Consultants/Other Providers:
chief complaint : admitted for ARF       SUBJECTIVE / OVERNIGHT EVENTS: pt syas she feels much better, denies chest pain, shortness of breath, nausea, vomiting     MEDICATIONS  (STANDING):  atorvastatin 40 milliGRAM(s) Oral at bedtime  ferrous    sulfate 325 milliGRAM(s) Oral daily  pantoprazole    Tablet 40 milliGRAM(s) Oral before breakfast  insulin lispro (HumaLOG) corrective regimen sliding scale   SubCutaneous three times a day before meals  dextrose 5%. 1000 milliLiter(s) (50 mL/Hr) IV Continuous <Continuous>  dextrose 50% Injectable 12.5 Gram(s) IV Push once  dextrose 50% Injectable 25 Gram(s) IV Push once  dextrose 50% Injectable 25 Gram(s) IV Push once  metoprolol 25 milliGRAM(s) Oral two times a day  furosemide    Tablet 20 milliGRAM(s) Oral every other day    MEDICATIONS  (PRN):  acetaminophen   Tablet 650 milliGRAM(s) Oral every 6 hours PRN mild, moderate pain  ondansetron    Tablet 4 milliGRAM(s) Oral every 6 hours PRN Nausea and/or Vomiting  docusate sodium 100 milliGRAM(s) Oral two times a day PRN Constipation  senna 2 Tablet(s) Oral at bedtime PRN Constipation  polyethylene glycol 3350 17 Gram(s) Oral daily PRN Constipation  sodium chloride 0.65% Nasal 1 Spray(s) Both Nostrils three times a day PRN Nasal Congestion  simethicone 80 milliGRAM(s) Chew every 6 hours PRN Gas  dextrose Gel 1 Dose(s) Oral once PRN Blood Glucose LESS THAN 70 milliGRAM(s)/deciliter  glucagon  Injectable 1 milliGRAM(s) IntraMuscular once PRN Glucose LESS THAN 70 milligrams/deciliter  melatonin 3 milliGRAM(s) Oral at bedtime PRN Insomnia      Vital Signs Last 24 Hrs  T(C): 36.6 (05 Sep 2017 21:37), Max: 36.6 (05 Sep 2017 05:24)  T(F): 97.9 (05 Sep 2017 21:37), Max: 97.9 (05 Sep 2017 05:24)  HR: 81 (05 Sep 2017 21:37) (54 - 98)  BP: 121/67 (05 Sep 2017 21:37) (101/71 - 130/73)  BP(mean): --  RR: 17 (05 Sep 2017 21:37) (17 - 18)  SpO2: 100% (05 Sep 2017 21:37) (99% - 100%)      Constitutional: No fever, fatigue  Skin: No rash.  Eyes: No recent vision problems or eye pain.  ENT: No congestion, ear pain, or sore throat.  Cardiovascular: No chest pain or palpation.  Respiratory: No cough, shortness of breath, congestion, or wheezing.  Gastrointestinal: No abdominal pain, nausea, vomiting, or diarrhea.  Genitourinary: No dysuria.  Musculoskeletal: No joint swelling.  Neurologic: No headache.    PHYSICAL EXAM:  GENERAL: NAD  EYES: EOMI, PERRLA  NECK: Supple, No JVD  CHEST/LUNG: dec breath sounds at bases   HEART:  S1 , S2 +  ABDOMEN: soft, bs+  EXTREMITIES:  trace edema  NEUROLOGY: alert awake oriented       LABS:                 LABS:      137  |  98  |  24<H>  ----------------------------<  121<H>  3.7   |  27  |  1.14    Ca    9.2      05 Sep 2017 06:36      Creatinine Trend: 1.14 <--, 1.39 <--, 1.98 <--, 2.26 <--, 2.36 <--, 3.82 <--, 2.89 <--                        9.4    5.85  )-----------( 257      ( 05 Sep 2017 06:36 )             30.4     Urine Studies:  Urinalysis Basic - ( 02 Sep 2017 21:47 )    Color: PLYEL / Appearance: HAZY / S.012 / pH: 6.0  Gluc: NEGATIVE / Ketone: NEGATIVE  / Bili: NEGATIVE / Urobili: NORMAL E.U.   Blood: NEGATIVE / Protein: NEGATIVE / Nitrite: NEGATIVE   Leuk Esterase: LARGE / RBC:  / WBC 25-50   Sq Epi: OCC / Non Sq Epi:  / Bacteria:       Sodium, Random Urine: < 20 meq/L ( @ 19:24)  Creatinine, Random Urine: 88.02 mg/dL ( @ 16:45)            PT/INR - ( 05 Sep 2017 06:36 )   PT: 25.9 SEC;   INR: 2.27          PTT - ( 05 Sep 2017 06:36 )  PTT:64.6 SEC
chief complaint : admitted for ARF       SUBJECTIVE / OVERNIGHT EVENTS: pt syas she feels much better, denies chest pain, shortness of breath, nausea, vomiting     MEDICATIONS  (STANDING):  atorvastatin 40 milliGRAM(s) Oral at bedtime  ferrous    sulfate 325 milliGRAM(s) Oral daily  pantoprazole    Tablet 40 milliGRAM(s) Oral before breakfast  insulin lispro (HumaLOG) corrective regimen sliding scale   SubCutaneous three times a day before meals  dextrose 5%. 1000 milliLiter(s) (50 mL/Hr) IV Continuous <Continuous>  dextrose 50% Injectable 12.5 Gram(s) IV Push once  dextrose 50% Injectable 25 Gram(s) IV Push once  dextrose 50% Injectable 25 Gram(s) IV Push once  metoprolol 25 milliGRAM(s) Oral two times a day  furosemide    Tablet 20 milliGRAM(s) Oral every other day    MEDICATIONS  (PRN):  acetaminophen   Tablet 650 milliGRAM(s) Oral every 6 hours PRN mild, moderate pain  ondansetron    Tablet 4 milliGRAM(s) Oral every 6 hours PRN Nausea and/or Vomiting  docusate sodium 100 milliGRAM(s) Oral two times a day PRN Constipation  senna 2 Tablet(s) Oral at bedtime PRN Constipation  polyethylene glycol 3350 17 Gram(s) Oral daily PRN Constipation  sodium chloride 0.65% Nasal 1 Spray(s) Both Nostrils three times a day PRN Nasal Congestion  simethicone 80 milliGRAM(s) Chew every 6 hours PRN Gas  dextrose Gel 1 Dose(s) Oral once PRN Blood Glucose LESS THAN 70 milliGRAM(s)/deciliter  glucagon  Injectable 1 milliGRAM(s) IntraMuscular once PRN Glucose LESS THAN 70 milligrams/deciliter  melatonin 3 milliGRAM(s) Oral at bedtime PRN Insomnia    Vital Signs Last 24 Hrs  T(C): 36.6 (06 Sep 2017 21:31), Max: 36.8 (06 Sep 2017 05:16)  T(F): 97.8 (06 Sep 2017 21:31), Max: 98.2 (06 Sep 2017 05:16)  HR: 90 (06 Sep 2017 21:31) (80 - 98)  BP: 100/57 (06 Sep 2017 21:31) (100/57 - 110/80)  BP(mean): --  RR: 18 (06 Sep 2017 21:31) (18 - 18)  SpO2: 99% (06 Sep 2017 21:31) (98% - 100%)    Constitutional: No fever, fatigue  Skin: No rash.  Eyes: No recent vision problems or eye pain.  ENT: No congestion, ear pain, or sore throat.  Cardiovascular: No chest pain or palpation.  Respiratory: No cough, shortness of breath, congestion, or wheezing.  Gastrointestinal: No abdominal pain, nausea, vomiting, or diarrhea.  Genitourinary: No dysuria.  Musculoskeletal: No joint swelling.  Neurologic: No headache.    PHYSICAL EXAM:  GENERAL: NAD  EYES: EOMI, PERRLA  NECK: Supple, No JVD  CHEST/LUNG: dec breath sounds at bases   HEART:  S1 , S2 +  ABDOMEN: soft, bs+  EXTREMITIES:  trace edema  NEUROLOGY: alert awake oriented       LLABS:      141  |  101  |  23  ----------------------------<  108<H>  4.6   |  25  |  1.29    Ca    9.5      06 Sep 2017 05:14  Mg     1.6           Creatinine Trend: 1.29 <--, 1.14 <--, 1.39 <--, 1.98 <--, 2.26 <--, 2.36 <--, 3.82 <--, 2.89 <--                        9.4    6.74  )-----------( 274      ( 06 Sep 2017 05:14 )             30.3     Urine Studies:  Urinalysis Basic - ( 02 Sep 2017 21:47 )    Color: PLYEL / Appearance: HAZY / S.012 / pH: 6.0  Gluc: NEGATIVE / Ketone: NEGATIVE  / Bili: NEGATIVE / Urobili: NORMAL E.U.   Blood: NEGATIVE / Protein: NEGATIVE / Nitrite: NEGATIVE   Leuk Esterase: LARGE / RBC:  / WBC 25-50   Sq Epi: OCC / Non Sq Epi:  / Bacteria:       Sodium, Random Urine: < 20 meq/L ( @ 19:24)  Creatinine, Random Urine: 88.02 mg/dL ( @ 16:45)            PT/INR - ( 06 Sep 2017 05:14 )   PT: 30.1 SEC;   INR: 2.63          PTT - ( 06 Sep 2017 05:14 )  PTT:36.4 SEC
chief complaint : admitted for ARF       SUBJECTIVE / OVERNIGHT EVENTS: pt syas she feels much better, denies chest pain, shortness of breath, nausea, vomiting     MEDICATIONS  (STANDING):  atorvastatin 40 milliGRAM(s) Oral at bedtime  ferrous    sulfate 325 milliGRAM(s) Oral daily  pantoprazole    Tablet 40 milliGRAM(s) Oral before breakfast  insulin lispro (HumaLOG) corrective regimen sliding scale   SubCutaneous three times a day before meals  dextrose 5%. 1000 milliLiter(s) (50 mL/Hr) IV Continuous <Continuous>  dextrose 50% Injectable 12.5 Gram(s) IV Push once  dextrose 50% Injectable 25 Gram(s) IV Push once  dextrose 50% Injectable 25 Gram(s) IV Push once  metoprolol 25 milliGRAM(s) Oral two times a day  furosemide    Tablet 20 milliGRAM(s) Oral every other day  magnesium oxide 400 milliGRAM(s) Oral three times a day with meals    MEDICATIONS  (PRN):  acetaminophen   Tablet 650 milliGRAM(s) Oral every 6 hours PRN mild, moderate pain  ondansetron    Tablet 4 milliGRAM(s) Oral every 6 hours PRN Nausea and/or Vomiting  docusate sodium 100 milliGRAM(s) Oral two times a day PRN Constipation  senna 2 Tablet(s) Oral at bedtime PRN Constipation  polyethylene glycol 3350 17 Gram(s) Oral daily PRN Constipation  sodium chloride 0.65% Nasal 1 Spray(s) Both Nostrils three times a day PRN Nasal Congestion  simethicone 80 milliGRAM(s) Chew every 6 hours PRN Gas  dextrose Gel 1 Dose(s) Oral once PRN Blood Glucose LESS THAN 70 milliGRAM(s)/deciliter  glucagon  Injectable 1 milliGRAM(s) IntraMuscular once PRN Glucose LESS THAN 70 milligrams/deciliter  melatonin 3 milliGRAM(s) Oral at bedtime PRN Insomnia    Vital Signs Last 24 Hrs  T(C): 36.7 (07 Sep 2017 15:04), Max: 36.9 (07 Sep 2017 05:44)  T(F): 98 (07 Sep 2017 15:04), Max: 98.4 (07 Sep 2017 05:44)  HR: 80 (07 Sep 2017 15:04) (80 - 96)  BP: 110/70 (07 Sep 2017 15:04) (100/57 - 110/70)  BP(mean): --  RR: 18 (07 Sep 2017 15:04) (17 - 19)  SpO2: 100% (07 Sep 2017 15:04) (97% - 100%)    Constitutional: No fever, fatigue  Skin: No rash.  Eyes: No recent vision problems or eye pain.  ENT: No congestion, ear pain, or sore throat.  Cardiovascular: No chest pain or palpation.  Respiratory: No cough, shortness of breath, congestion, or wheezing.  Gastrointestinal: No abdominal pain, nausea, vomiting, or diarrhea.  Genitourinary: No dysuria.  Musculoskeletal: No joint swelling.  Neurologic: No headache.    PHYSICAL EXAM:  GENERAL: NAD  EYES: EOMI, PERRLA  NECK: Supple, No JVD  CHEST/LUNG: dec breath sounds at bases   HEART:  S1 , S2 +  ABDOMEN: soft, bs+  EXTREMITIES:  trace edema  NEUROLOGY: alert awake oriented       LABS:      138  |  100  |  22  ----------------------------<  115<H>  3.9   |  23  |  1.22    Ca    9.6      07 Sep 2017 06:21  Mg     1.6           Creatinine Trend: 1.22 <--, 1.29 <--, 1.14 <--, 1.39 <--, 1.98 <--, 2.26 <--, 2.36 <--, 3.82 <--                        9.1    6.28  )-----------( 128      ( 07 Sep 2017 06:21 )             30.1     Urine Studies:  Urinalysis Basic - ( 02 Sep 2017 21:47 )    Color: PLYEL / Appearance: HAZY / S.012 / pH: 6.0  Gluc: NEGATIVE / Ketone: NEGATIVE  / Bili: NEGATIVE / Urobili: NORMAL E.U.   Blood: NEGATIVE / Protein: NEGATIVE / Nitrite: NEGATIVE   Leuk Esterase: LARGE / RBC:  / WBC 25-50   Sq Epi: OCC / Non Sq Epi:  / Bacteria:       Sodium, Random Urine: < 20 meq/L ( @ 19:24)  Creatinine, Random Urine: 88.02 mg/dL ( @ 16:45)            PT/INR - ( 07 Sep 2017 09:36 )   PT: 32.7 SEC;   INR: 2.86          PTT - ( 06 Sep 2017 05:14 )  PTT:36.4 SEC

## 2017-09-08 ENCOUNTER — TRANSCRIPTION ENCOUNTER (OUTPATIENT)
Age: 77
End: 2017-09-08

## 2017-09-08 VITALS
SYSTOLIC BLOOD PRESSURE: 97 MMHG | RESPIRATION RATE: 16 BRPM | DIASTOLIC BLOOD PRESSURE: 60 MMHG | OXYGEN SATURATION: 95 % | HEART RATE: 76 BPM | TEMPERATURE: 99 F

## 2017-09-08 LAB
BUN SERPL-MCNC: 21 MG/DL — SIGNIFICANT CHANGE UP (ref 7–23)
CALCIUM SERPL-MCNC: 9.3 MG/DL — SIGNIFICANT CHANGE UP (ref 8.4–10.5)
CHLORIDE SERPL-SCNC: 97 MMOL/L — LOW (ref 98–107)
CO2 SERPL-SCNC: 23 MMOL/L — SIGNIFICANT CHANGE UP (ref 22–31)
CREAT SERPL-MCNC: 1.13 MG/DL — SIGNIFICANT CHANGE UP (ref 0.5–1.3)
GLUCOSE SERPL-MCNC: 118 MG/DL — HIGH (ref 70–99)
HCT VFR BLD CALC: 32.4 % — LOW (ref 34.5–45)
HGB BLD-MCNC: 10.1 G/DL — LOW (ref 11.5–15.5)
MAGNESIUM SERPL-MCNC: 1.6 MG/DL — SIGNIFICANT CHANGE UP (ref 1.6–2.6)
MCHC RBC-ENTMCNC: 23.8 PG — LOW (ref 27–34)
MCHC RBC-ENTMCNC: 31.2 % — LOW (ref 32–36)
MCV RBC AUTO: 76.4 FL — LOW (ref 80–100)
NRBC # FLD: 0 — SIGNIFICANT CHANGE UP
PHOSPHATE SERPL-MCNC: 3.2 MG/DL — SIGNIFICANT CHANGE UP (ref 2.5–4.5)
PLATELET # BLD AUTO: 258 K/UL — SIGNIFICANT CHANGE UP (ref 150–400)
PMV BLD: 9.7 FL — SIGNIFICANT CHANGE UP (ref 7–13)
POTASSIUM SERPL-MCNC: 4.2 MMOL/L — SIGNIFICANT CHANGE UP (ref 3.5–5.3)
POTASSIUM SERPL-SCNC: 4.2 MMOL/L — SIGNIFICANT CHANGE UP (ref 3.5–5.3)
RBC # BLD: 4.24 M/UL — SIGNIFICANT CHANGE UP (ref 3.8–5.2)
RBC # FLD: 21.9 % — HIGH (ref 10.3–14.5)
SODIUM SERPL-SCNC: 137 MMOL/L — SIGNIFICANT CHANGE UP (ref 135–145)
WBC # BLD: 8.06 K/UL — SIGNIFICANT CHANGE UP (ref 3.8–10.5)
WBC # FLD AUTO: 8.06 K/UL — SIGNIFICANT CHANGE UP (ref 3.8–10.5)

## 2017-09-08 RX ORDER — FUROSEMIDE 40 MG
1 TABLET ORAL
Qty: 0 | Refills: 0 | COMMUNITY
Start: 2017-09-08

## 2017-09-08 RX ORDER — FERROUS SULFATE 325(65) MG
1 TABLET ORAL
Qty: 0 | Refills: 0 | COMMUNITY

## 2017-09-08 RX ORDER — INSULIN LISPRO 100/ML
0 VIAL (ML) SUBCUTANEOUS
Qty: 0 | Refills: 0 | COMMUNITY
Start: 2017-09-08

## 2017-09-08 RX ORDER — METFORMIN HYDROCHLORIDE 850 MG/1
1 TABLET ORAL
Qty: 0 | Refills: 0 | COMMUNITY

## 2017-09-08 RX ORDER — LANOLIN ALCOHOL/MO/W.PET/CERES
1 CREAM (GRAM) TOPICAL
Qty: 0 | Refills: 0 | COMMUNITY
Start: 2017-09-08

## 2017-09-08 RX ADMIN — Medication 20 MILLIGRAM(S): at 11:49

## 2017-09-08 RX ADMIN — Medication 25 MILLIGRAM(S): at 06:43

## 2017-09-08 RX ADMIN — MAGNESIUM OXIDE 400 MG ORAL TABLET 400 MILLIGRAM(S): 241.3 TABLET ORAL at 17:13

## 2017-09-08 RX ADMIN — MAGNESIUM OXIDE 400 MG ORAL TABLET 400 MILLIGRAM(S): 241.3 TABLET ORAL at 11:49

## 2017-09-08 RX ADMIN — Medication 325 MILLIGRAM(S): at 11:49

## 2017-09-08 RX ADMIN — PANTOPRAZOLE SODIUM 40 MILLIGRAM(S): 20 TABLET, DELAYED RELEASE ORAL at 06:42

## 2017-09-08 RX ADMIN — MAGNESIUM OXIDE 400 MG ORAL TABLET 400 MILLIGRAM(S): 241.3 TABLET ORAL at 08:43

## 2017-09-08 NOTE — DISCHARGE NOTE ADULT - MEDICATION SUMMARY - MEDICATIONS TO STOP TAKING
I will STOP taking the medications listed below when I get home from the hospital:    glipiZIDE 10 mg oral tablet  -- 1 tab(s) by mouth once a day    metFORMIN 1000 mg oral tablet  -- 1 tab(s) by mouth 2 times a day

## 2017-09-08 NOTE — DISCHARGE NOTE ADULT - CARE PLAN
Principal Discharge DX:	Acute renal failure, unspecified acute renal failure type  Goal:	Followup with PMD and take all medications prescribed.  Instructions for follow-up, activity and diet:	Followup with PMD and take all medications prescribed. Low salt, low fat, low cholesterol, diabetic diet.  Secondary Diagnosis:	Essential hypertension  Goal:	Followup with PMD and take all medications prescribed.  Instructions for follow-up, activity and diet:	Followup with PMD and take all medications prescribed. Low salt, low fat, low cholesterol, diabetic diet.  Secondary Diagnosis:	Chronic atrial fibrillation  Goal:	Followup with PMD and take all medications prescribed.  Instructions for follow-up, activity and diet:	Followup with PMD and take all medications prescribed. Low salt, low fat, low cholesterol, diabetic diet.  Secondary Diagnosis:	Type 2 diabetes mellitus without complication, without long-term current use of insulin  Goal:	Followup with PMD and take all medications prescribed.  Instructions for follow-up, activity and diet:	Followup with PMD and take all medications prescribed. Low salt, low fat, low cholesterol, diabetic diet.

## 2017-09-08 NOTE — DISCHARGE NOTE ADULT - MEDICATION SUMMARY - MEDICATIONS TO TAKE
I will START or STAY ON the medications listed below when I get home from the hospital:    acetaminophen 325 mg oral tablet  -- 2 tab(s) by mouth every 6 hours, As needed, mild, moderate pain  -- Indication: For pain reliever    warfarin 3 mg oral tablet  -- 3 milligram(s) by mouth once a day  -- Indication: For Chronic atrial fibrillation    insulin lispro 100 units/mL subcutaneous solution  --  subcutaneous 3 times a day (before meals); 1 Unit(s) if Glucose 151 - 200  2 Unit(s) if Glucose 201 - 250  3 Unit(s) if Glucose 251 - 300  4 Unit(s) if Glucose 301 - 350  5 Unit(s) if Glucose 351 - 400  6 Unit(s) if Glucose Greater Than 400  -- Indication: For DM    ondansetron 8 mg oral tablet  -- 1 tab(s) by mouth 2 times a day  -- Indication: For nausea / vomiting    atorvastatin 40 mg oral tablet  -- 1 tab(s) by mouth once a day (at bedtime)  -- Indication: For HLD    metoprolol tartrate 25 mg oral tablet  -- 1 tab(s) by mouth 2 times a day  -- Indication: For HTN (hypertension)    furosemide 20 mg oral tablet  -- 1 tab(s) by mouth every other day  -- Indication: For HTN (hypertension)    senna oral tablet  -- 2 tab(s) by mouth once a day (at bedtime)  -- Indication: For Laxative    docusate sodium 100 mg oral capsule  -- 1 cap(s) by mouth 2 times a day  -- Indication: For Stool softner    polyethylene glycol 3350 oral powder for reconstitution  -- 17 gram(s) by mouth once a day  -- Indication: For Laxative    simethicone 80 mg oral tablet, chewable  -- 1 tab(s) by mouth every 6 hours, As needed, Gas  -- Indication: For Gerd    sodium chloride 0.65% nasal spray  -- 1 spray(s) into nose 3 times a day as needed for into nose congestion.   -- Indication: For Nasal spray    melatonin 3 mg oral tablet  -- 1 tab(s) by mouth once a day (at bedtime), As needed, Insomnia  -- Indication: For sleep aid    pantoprazole 40 mg oral delayed release tablet  -- 1 tab(s) by mouth 2 times a day (before meals)  -- Indication: For gerd

## 2017-09-08 NOTE — DISCHARGE NOTE ADULT - HOSPITAL COURSE
75 yo F with DM, HTN, CHF, A fib on coumadin sent from rehab center for SHELBY. Pt recently admitted for CHF and SHELBY discharged to rehab 1 week ago. Labwork done in rehab showed SHELBY so pt sent to the ED. Pt only c/o foot pain which she has had since prior admission, otherwise feels close to her baseline.   SHELBY (acute kidney injury):  - Likely prerenal/ med related. Will give 500cc bolus, however given pt also has CHF will need to monitor vol status carefully - Hold lasix - Renal consult in am- Dr Lazcano.   Chronic systolic congestive heart failure: - Currently euvolemic; may be slightly hypovolemic from diuresis + poor PO intake  - Monitor vol status  - Hold lasix given her SHELBY. Also holding metoprolol for now given BP in 80s   - Cardiology consult in am- Dr Jorge Plummer for medication optimization.   Chronic atrial fibrillation: - Continue coumadin - Hold metoprolol for now given BP in 80s.   Type 2 diabetes mellitus without complication, without long-term current use of insulin: - Monitor FSG  - Insulin sliding scale.   9/3- Cards- hold lasix restart Metprolol. Can give more IVF  9/5 RENAL: SHELBY Likely prerenal state, possible dehydration Renal function is improving  Monitor renal function Eosinophils are positive in urine but unlikely she has AIN as there is no history to suggest it and also renal function is improving too fast for AIN.   9/5 CARDIO: Dr Plummer following, c/w Coumadin fo afib and current meds  9/6: Renal: n  Problem: SHELBY (acute kidney injury).  Plan: Likely prerenal state, possible dehydration, Renal function has improved, Monitor renal function  Eosinophils are positive in urine but unlikely she has AIN as there is no history to suggest it and also renal function is improving too fast for AIN.   9/8- As per attending, patient stable for discharge.

## 2017-09-08 NOTE — DISCHARGE NOTE ADULT - SECONDARY DIAGNOSIS.
Essential hypertension Chronic atrial fibrillation Type 2 diabetes mellitus without complication, without long-term current use of insulin

## 2017-09-08 NOTE — DISCHARGE NOTE ADULT - PATIENT PORTAL LINK FT
“You can access the [facility] Patient Portal, offered by [facility], by registering with the following website: [email or web address]”

## 2017-11-13 ENCOUNTER — RECORD ABSTRACTING (OUTPATIENT)
Age: 77
End: 2017-11-13

## 2017-11-13 DIAGNOSIS — I10 ESSENTIAL (PRIMARY) HYPERTENSION: ICD-10-CM

## 2017-11-13 DIAGNOSIS — I50.9 HEART FAILURE, UNSPECIFIED: ICD-10-CM

## 2017-11-13 DIAGNOSIS — E11.9 TYPE 2 DIABETES MELLITUS W/OUT COMPLICATIONS: ICD-10-CM

## 2017-11-13 DIAGNOSIS — I48.91 UNSPECIFIED ATRIAL FIBRILLATION: ICD-10-CM

## 2017-11-13 DIAGNOSIS — N17.9 ACUTE KIDNEY FAILURE, UNSPECIFIED: ICD-10-CM

## 2017-11-13 RX ORDER — INSULIN LISPRO 100 [IU]/ML
INJECTION, SOLUTION INTRAVENOUS; SUBCUTANEOUS
Refills: 0 | Status: ACTIVE | COMMUNITY

## 2017-11-13 RX ORDER — ONDANSETRON HYDROCHLORIDE 4 MG/1
TABLET, FILM COATED ORAL
Refills: 0 | Status: ACTIVE | COMMUNITY

## 2017-11-13 RX ORDER — GLUCOSAMINE HCL/CHONDROITIN SU 500-400 MG
3 CAPSULE ORAL
Refills: 0 | Status: ACTIVE | COMMUNITY

## 2017-11-13 RX ORDER — METOPROLOL TARTRATE 25 MG/1
25 TABLET, FILM COATED ORAL TWICE DAILY
Qty: 180 | Refills: 1 | Status: ACTIVE | COMMUNITY

## 2017-11-13 RX ORDER — FUROSEMIDE 20 MG/1
20 TABLET ORAL DAILY
Qty: 90 | Refills: 3 | Status: ACTIVE | COMMUNITY

## 2017-11-13 RX ORDER — SODIUM CHLORIDE 0.65 %
0.65 AEROSOL, SPRAY (ML) NASAL
Refills: 0 | Status: ACTIVE | COMMUNITY

## 2017-11-13 RX ORDER — WARFARIN 3 MG/1
3 TABLET ORAL
Refills: 0 | Status: ACTIVE | COMMUNITY

## 2017-11-13 RX ORDER — DOCUSATE SODIUM 100 MG/1
100 CAPSULE, LIQUID FILLED ORAL TWICE DAILY
Refills: 0 | Status: ACTIVE | COMMUNITY

## 2017-11-13 RX ORDER — SENNOSIDES 8.6 MG TABLETS 8.6 MG/1
TABLET ORAL
Refills: 0 | Status: ACTIVE | COMMUNITY

## 2017-11-13 RX ORDER — ATORVASTATIN CALCIUM 40 MG/1
40 TABLET, FILM COATED ORAL DAILY
Qty: 90 | Refills: 3 | Status: ACTIVE | COMMUNITY

## 2017-11-13 RX ORDER — ACETAMINOPHEN 325 MG/1
325 TABLET ORAL
Refills: 0 | Status: ACTIVE | COMMUNITY

## 2017-11-13 RX ORDER — SIMETHICONE CHEW TAB 80 MG 80 MG
80 TABLET ORAL
Refills: 0 | Status: ACTIVE | COMMUNITY

## 2017-11-13 RX ORDER — PANTOPRAZOLE 40 MG/1
40 TABLET, DELAYED RELEASE ORAL
Refills: 0 | Status: ACTIVE | COMMUNITY

## 2017-11-13 RX ORDER — POLYETHYLENE GLYCOL 3350 17 G/17G
17 POWDER, FOR SOLUTION ORAL
Refills: 0 | Status: ACTIVE | COMMUNITY

## 2017-11-14 ENCOUNTER — APPOINTMENT (OUTPATIENT)
Dept: ELECTROPHYSIOLOGY | Facility: CLINIC | Age: 77
End: 2017-11-14

## 2018-05-18 NOTE — DIETITIAN INITIAL EVALUATION ADULT. - PERTINENT LABORATORY DATA
(8/7) WBC 3.44 L, Hbg 10.7 L, Na 134 L, Cl 95 L, BUN 28 H, Glu 188 H;            (8/4) Cholesterol 107 H, HDL 40 L;          (8/3) Phosphorus 2.3 L;           (8/1) Albumin 3.0 L
PT doesn't get vaccinated

## 2018-06-12 ENCOUNTER — RESULT REVIEW (OUTPATIENT)
Age: 78
End: 2018-06-12

## 2018-07-03 ENCOUNTER — EMERGENCY (EMERGENCY)
Facility: HOSPITAL | Age: 78
LOS: 1 days | Discharge: HOME | End: 2018-07-03
Attending: EMERGENCY MEDICINE | Admitting: EMERGENCY MEDICINE
Payer: MEDICARE

## 2018-07-03 VITALS
TEMPERATURE: 98 F | OXYGEN SATURATION: 97 % | SYSTOLIC BLOOD PRESSURE: 148 MMHG | HEART RATE: 88 BPM | RESPIRATION RATE: 16 BRPM | DIASTOLIC BLOOD PRESSURE: 74 MMHG

## 2018-07-03 PROCEDURE — 73140 X-RAY EXAM OF FINGER(S): CPT | Mod: 26,RT

## 2018-07-03 PROCEDURE — 99283 EMERGENCY DEPT VISIT LOW MDM: CPT

## 2018-07-03 NOTE — ED SUB INTERN NOTE - MEDICAL DECISION MAKING DETAILS
76 yo female with multiple comorbidities who presents with x 2 weeks of finger swelling and pain. Sent in by PMD for osteomyelitis and failure of Abx treatment. Based on history and physical, unlikely to be osteomyelitis, but will obtain finger xray

## 2018-07-03 NOTE — ED PROVIDER NOTE - MEDICAL DECISION MAKING DETAILS
r finger index mild induration, neg erythema, fluctuance, vss, no fever. low liklihood of septic joint. pt with hx of gout, but states usually joint is warm when gout flares up.

## 2018-07-03 NOTE — ED PROVIDER NOTE - OBJECTIVE STATEMENT
76 yo female with PMHx of DM, CKD stage 4, HTN, CHF, Afib on warfarin who presents with R index finger pain and swelling x 2 weeks. Pain worse with movement and there is associated erythema. Denies any trauma to finger. Denies fever, chills, n/v/d. Saw PMD who prescribed 7 days of Augmentin which did not improve finger pain. PMD attempted needle I&D today without improvement. Sent pt to ED for r/o osteomyelitis

## 2018-07-03 NOTE — ED SUB INTERN NOTE - PMH
Atrial fibrillation  on coumadin  CHF (congestive heart failure)    CKD (chronic kidney disease) stage 4, GFR 15-29 ml/min    Diabetes mellitus    Hypertension

## 2018-07-03 NOTE — ED PROVIDER NOTE - MUSCULOSKELETAL, MLM
Spine appears normal, range of motion is not limited, -muscle ttp, +2nd index DIP tenderness with extension

## 2018-07-03 NOTE — ED ADULT TRIAGE NOTE - CHIEF COMPLAINT QUOTE
Pt c/o right index finger swelling, sent in by MD for eval. Finished course of antioncotic 1 week ago.

## 2018-07-03 NOTE — ED SUB INTERN NOTE - OBJECTIVE STATEMENT FT
76 yo female with PMHx of DM, CKD stage 4, HTN, CHF, Afib on warfarin who presents with R index finger pain and swelling x 2 weeks. Pain worse with movement and there is associated erythema. Denies any trauma to finger. Denies fever, chills, n/v/d. Saw PMD who prescribed 7 days of Augmentin which did not improve finger pain. PMD attempted needle I&D today without improvement. Sent pt to ED for r/o osteomyelitis.

## 2018-07-16 PROBLEM — I49.9 CARDIAC ARRHYTHMIA, UNSPECIFIED: Chronic | Status: INACTIVE | Noted: 2017-04-03 | Resolved: 2017-08-02

## 2018-07-16 PROBLEM — E87.70 FLUID OVERLOAD, UNSPECIFIED: Chronic | Status: INACTIVE | Noted: 2017-04-03 | Resolved: 2017-08-02

## 2018-07-16 PROBLEM — E11.9 TYPE 2 DIABETES MELLITUS WITHOUT COMPLICATIONS: Chronic | Status: INACTIVE | Noted: 2017-04-03 | Resolved: 2017-08-02

## 2018-08-20 NOTE — ED ADULT NURSE NOTE - PMH
I will SWITCH the dose or number of times a day I take the medications listed below when I get home from the hospital:  None
Diabetes    Hypertension

## 2019-04-14 NOTE — PROGRESS NOTE ADULT - ATTENDING COMMENTS
Benefits, risks, and possible complications of procedure explained to patient/caregiver who verbalized understanding and gave verbal consent.
Jorge Plummer MD,FACC.  [address] [place], [place] [phone]
Jorge Plummer MD,FACC.  [address] [place], [place] [phone]
Jorge Plummer MD,FACC.  [address] [place], [place] [phone]
Jorge Plummer MD,FACC.  [address] [place], [place] [phone]
Jorge Plummer MD,FACC.  [address] [place], [place] [phone]

## 2019-07-19 NOTE — H&P ADULT - NEGATIVE OPHTHALMOLOGIC SYMPTOMS
falls/dizziness no blurred vision L/no photophobia/no diplopia/no blurred vision R/no loss of vision L/no loss of vision R

## 2019-08-07 NOTE — ED PROVIDER NOTE - DISCUSSED CLINICAL AND RADIOLOGICAL FINDINGS WITH, MDM
Well controlled on atorvastatin at 40 mg    Total cholesterol below 150 and LDL well below 70 [id number] Detailed patient/family

## 2019-08-19 NOTE — DIETITIAN INITIAL EVALUATION ADULT. - PROBLEM SELECTOR PLAN 1
POAG OU Severe-  Discussed findings w/patient-  IOP's slightly higher at 20 27-  Cup/disc ratio 0.4/0.4 OD 0.9/0.9 OS-  Gonio done today: grade 4 w/moderate pigment-  s/p YAG PI OU-  Continue w/o gtts at this time-  Continue to monitor every 3-4 months.-  RTC 3 mo f/u POAG w/ ARMD/ERM OU-  Discussed findings w/patient-  Discontinue AREDS 2 d/t causing stomach issues. -  Continue use of AG daily call or come in ASAP if changes in vision are noted from today.  -  will continue to monitor every 3-4 months -  monitor as scheduled or prn Bacterial Conjunctivitis OS-  discussed findings w/patient-  start Maxitrol QID OS x 7 days -  continue to monitor  Ectropion OS>>OD-  discussed findings w/patient-  visually significant with symptoms-  referral to [address] [place] [address] for Ectropion Eval Blepharitis/MGD OU-  discussed findings w/patient-  re-start Avenova QD OU Rx sent to [id number] [address] as scheduled or prn; 's Notes: MR 10/10/2018DFE 10/10/2018PACH 12/13/2018 [place] OCT disc 12/13/201824-2 VF 12/13/2018Gonio ekg/telemetry, monitor bp, MICU c/s, hold anti-hypertensives, s/p iv fluids
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
## 2020-09-03 NOTE — H&P ADULT - PSYCHIATRIC DETAILS
Daily Note     Today's date: 9/3/2020  Patient name: Kathryn Jenkins  : 1983  MRN: [id number]  Referring provider: Elías Marion DO  Dx:   Encounter Diagnosis     ICD-10-CM    1  Pelvic floor dysfunction in female  M62 89    2  Chronic bilateral low back pain without sciatica  M54 5     G89 29                   Subjective: Pt admits she has not performed prescribed HEP despite encouragements  Pt notes difficulty making time for self  Objective: See treatment diary below       Assessment: Encouraged pt to prioritize self care and exercises daily in order to make progress with strength gains  Pt noted left side glut pain pre-tx  Added self-trigger point release to gluts with soft  ball, pt noted decrease in tailbone and glut pain from 3-4/10 to 1/10  Pt tolerated TPR to b/l hip flexor  Patient would benefit from continued PT      Plan: Continue per plan of care        Precautions: PCOS  Focus on core strength, PF, and functional activities      Manuals 9/3            STM to PFM             TPR left QL/oblique             Assessment             TPR of iliopsoas  MONET  b/l            Neuro Re-Ed             TA with hip add iso             TA with hip abd iso             TA march  With heel press/elbow press             TA bridge             Diaphragmatic breathing  throught tx            Hip abd Iso -> TA Belt  5"x10            Hip abd iso -> TA -> left heel press             Ther Ex             Happy Baby S             Piriformis S 30"x1            [place] S             Self-TPR with ball performed                                                                Ther Activity                                       Gait Training                                       Modalities normal affect

## 2020-10-12 NOTE — ACUTE INTERFACILITY TRANSFER NOTE - HOSPITAL COURSE
dr Shane 75 y/o female, PMHx HTN, CHF, DM, afib on coumadin, fluid retention on lasix, c/o palpitations x3 days and worsening bl leg swelling x2-3 weeks. Denies cp, , dyspnea, diaphoresis, hemoptysis, cough, fever/chills, hx dvt, calf pain or any other concerns. Admitted to medicine for CHF exacerbation.    Vitals in ED: HR: 77 /min  /102 RR 18 O2sat 96% on RA  EKG  showed afib, with inverted T, BNP elevated 5125, + trop 0.052 (chronic) ----> 0.061 ----> 0.051  CXR - slight state of CHF, possible enlarged heart     Given Lasix, ASA, BB and statin, placed on telemetry    Cardiology consulted- patient to be transferred to [facility] for cardiac catheterization. Pt stable for transfer per attending. 77 y/o female, PMHx HTN, CHF, DM, afib on coumadin, fluid retention on lasix, c/o palpitations x3 days and worsening bl leg swelling x2-3 weeks. Denies cp, , dyspnea, diaphoresis, hemoptysis, cough, fever/chills, hx dvt, calf pain or any other concerns. Admitted to medicine for CHF exacerbation.    Vitals in ED: HR: 77 /min  /102 RR 18 O2sat 96% on RA  EKG  showed afib, with inverted T, BNP elevated 5125, + trop 0.052 (chronic) ----> 0.061 ----> 0.051  CXR - slight state of CHF, possible enlarged heart     Given Lasix, ASA, BB and statin, placed on telemetry    Cardiology consulted- patient to be transferred to [facility] for cardiac catheterization. Pt stable for transfer per attending.  INR in AM 1.7. As per Cardiology INR must be less than 2    Daughter notified of result and refers to understand.

## 2020-11-30 NOTE — PATIENT PROFILE ADULT. - SUPPORT PERSON NAME
Pt with multiple enrrique/maroon colored stools overnight, still having active GI bleeding. Anesthesia attempting Central Line at bedside. Critical Care time at bedside 1 hour.     I updated wife Latricia [phone], all questions answered.     Ron Duran MD   Jamal Patterson

## 2021-01-25 NOTE — PHYSICAL THERAPY INITIAL EVALUATION ADULT - MUSCLE TONE ASSESSMENT, REHAB EVAL
[FreeTextEntry1] : Please note I personally reviewed the above with PA.  I was physically present during the service of the patient and directly involved in the management plan and recommendations of care for the patient.\par  normal

## 2021-03-15 NOTE — PROGRESS NOTE ADULT - PROBLEM SELECTOR PLAN 2
Likely prerenal state, possible dehydration  Renal function is improving  Monitor renal function  Eosinophils are positive in urine but unlikely she has AIN as there is no history to suggest it and also renal function is improving too fast for AIN. Infliximab Counseling:  I discussed with the patient the risks of infliximab including but not limited to myelosuppression, immunosuppression, autoimmune hepatitis, demyelinating diseases, lymphoma, and serious infections.  The patient understands that monitoring is required including a PPD at baseline and must alert us or the primary physician if symptoms of infection or other concerning signs are noted.

## 2021-06-09 NOTE — ED ADULT TRIAGE NOTE - RESPIRATORY RATE (BREATHS/MIN)
Anesthesia Pre Eval Note    Anesthesia ROS/Med Hx        Anesthetic Complication History:  Patient does not have a history of anesthetic complications      Pulmonary Review:  Patient does not have a pulmonary history      Neuro/Psych Review:  Patient does not have a neuro/psych history       Cardiovascular Review:  Positive for CAD  Positive for hypertension  Positive for hyperlipidemia    GI/HEPATIC/RENAL Review:  Patient does not have a GI/hepatic/renalhistory       End/Other Review:  Positive for diabetes      Relevant Problems   No relevant active problems       Physical Exam     Airway   Mallampati: II  TM Distance: >3 FB  Neck ROM: Full  Neck: Non-tender and Able to place in sniff position  TMJ Mobility: Good    Cardiovascular  Cardiovascular exam normal  Cardio Rhythm: Regular  Cardio Rate: Normal    Head Assessment  Head assessment: Normocephalic and Atraumatic    General Assessment  General Assessment: Alert and oriented and No acute distress    Dental Exam  Dental exam normal    Pulmonary Exam  Pulmonary exam normal  Breath sounds clear to auscultation:  Yes    Abdominal Exam  Abdominal exam normal      Anesthesia Plan:  Anesthesia Plan    ASA Status: 3    Anesthesia Type: MAC    Induction: Intravenous  Maintenance: TIVA      Post-op Pain Management: Per Surgeon      Checklist  Reviewed: NPO Status, Allergies, Medications, Problem list, Past Med History and Patient Summary  Consent/Risks Discussed Statement:  The proposed anesthetic plan, including its risks and benefits, have been discussed with the Patient along with the risks and benefits of alternatives. Questions were encouraged and answered and the patient and/or representative understands and agrees to proceed.        I discussed with the patient (and/or patient's legal representative) the risks and benefits of the proposed anesthesia plan, MAC, which may include services performed by other anesthesia providers.    Alternative anesthesia plans, if  available, were reviewed with the patient (and/or patient's legal representative). Discussion has been held with the patient (and/or patient's legal representative) regarding risks of anesthesia, which include Intra-operative Awareness and emergent situations that may require change in anesthesia plan.    The patient (and/or patient's legal representative) has indicated understanding, his/her questions have been answered, and he/she wishes to proceed with the planned anesthetic.    Blood Products: Not Anticipated     16
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
## 2021-09-16 NOTE — ED ADULT NURSE NOTE - NS ED NOTE ABUSE RESPONSE YN
[facility] 97. Patient Status:  Inpatient    1983 MRN [id number]   Location [address] [place] [address] Attending Madelin Limon MD   Hosp Day # 0 PCP Unknown Pcp     Date of Admission: 27-0.8-228 MG Oral Cap, Take 1 capsule by mouth daily. , Disp: , Rfl:         Review of Systems:   As documented in HPI      Physical Exam:   Pulse:  [96] 96  BP: (123)/(85) 123/85    Constitutional: alert and cooperative in moderate distress  Psych:  Alert Yes

## 2022-03-18 NOTE — PROGRESS NOTE ADULT - PROBLEM SELECTOR PLAN 2
- Continue with Lasix 40 daily   -  f/up 97.5 - stable  - Continue with Lasix 40 daily   -  f/up referred as she will benefit from coupons to help with her prescribed medications as patient states she was non compliant with medications due to inability to afford them.

## 2022-06-06 NOTE — DISCHARGE NOTE ADULT - ADMISSION DATE +STARTOFVISITDATE
Pt checked. Needs met. Safety maintained. Pt resting quietly on ED cart. No signs of distress noted. Security at door for 1:1.       Arturo Devir, [facility]  06/06/22 3425 Statement Selected

## 2022-07-07 NOTE — PROGRESS NOTE ADULT - PROBLEM SELECTOR PLAN 3
-ACS ruled out   - continue aspirin, statin and beta-blocker(atenolol) 1 Principal Discharge DX:	Pharyngitis

## 2022-11-10 NOTE — H&P ADULT - CARDIOVASCULAR
Pt referred for Depression.  Writer reviewed pt chart and made outreach attempt.  Left voice message encouraging return call.  Writer will try again in 1-2 days.    ADDEND 11/15/22  Writer reviewed pt chart and made second unsuccessful outreach attempt.  Left voice message encouraging return call.  Will try again next week.    details… detailed exam

## 2023-02-20 NOTE — DISCHARGE NOTE ADULT - LEARNING BEHAVIORAL ACTIVITIES TO COPE WITH URGES. FOR EXAMPLE, DISTRACTION AND CHANGING ROUTINES.
Additional Notes: Patient is considering starting dupixent. He will think about it and call.
Render Risk Assessment In Note?: no
Detail Level: Simple
Statement Selected

## 2023-11-24 NOTE — PATIENT PROFILE ADULT. - SOCIAL CONCERNS
Patient is called and told her there is a message out to Dr Coffey and will re ask if she may get something else for her pain.  Patient states she cannot walk well to at all.  She did see the chiropractor last week and once she left there she said she was worse. She cannot walk and had a lot of lower back pain like in the disk area she said.  She is taking her tramadol and has 6 left as she did take a few more in the last few days due to the pain.  She has been using heat then cold to area and she is taking tylenol and ibuprofen alternating and this has helped a bit more than what she's been the last few days.  ? Offer another medrol dose fabricio? Or need to see back specialist?   37 None

## 2023-12-18 NOTE — ED ADULT TRIAGE NOTE - ESI TRIAGE ACUITY LEVEL, MLM
Follow up with doctor to evaluate blood pressure.    Referral to orthopedics to evaluate shoulder if not better within 1-2 weeks.    
2

## 2024-08-23 NOTE — ED PROVIDER NOTE - CROS ED RESP ALL NEG
----- Message from Reggie Tabares DO sent at 8/23/2024  6:43 AM EDT -----  Please let patient know that blood work is stable, recommend follow-up mid September routine visit.  
Left message to return call about lab results  Left a detailed message      Ryleigh Nemec, MA  
- - -

## 2025-01-02 NOTE — ED ADULT NURSE NOTE - PAIN RATING/NUMBER SCALE (0-10): REST
2025       RE: Garcia Canada  : 1956   MRN: [id number]      Dear Colleague,    Thank you for referring your patient, Garcia Canada, to the [facility] at [facility]. Please see a copy of my visit note below.    Is the patient currently in the state of [place]? YES    Visit mode:VIDEO    If the visit is dropped, the patient can be reconnected by: VIDEO VISIT: Text to cell phone: [phone]    Will anyone else be joining the visit? YES: How would they like to receive their invitation? Text to cell phone: [phone]      How would you like to obtain your AVS? Mail a copy    Are changes needed to the allergy or medication list? NO    Reason for visit: Follow Up         CHIEF COMPLAINT: Seizures.     HISTORY OF PRESENT ILLNESS:  Video call for follow up. His friend Maribeth and his son were also with him with the video. This patient is a 68-year-old right-handed male returns for follow up for seizures. He had a history of right-sided meningioma, status post resection in 2014. He had an intracerebral hemorrhage over the left side of the brain. Since the last visit about 6 months ago, he had no seizures. His last seizure was March 15 of 2024. He is now on Lamictal 300-400 and Zonegran 300 at bedtime, Lacosamide 100 mg bid.     MRI brain in Oct. 2024 showed stable brain image.  Impression :   1. Mild enlargement of intraventricular meningioma in the left lateral ventricle. A cystic nonenhancing structure medial to the meningioma may represent cavum vergae.   2. Stable postop changes of left frontal resection cavity with large region of encephalomalacia in the left parietal lobe and left temporal lobe with ex vacuo dilation of the left lateral ventricle.   3. Stable 3 mm basilar tip aneurysm.     Zonegran helped a lot in the past for many years. He was having seizures once every 5-6 weeks before started on Zonegran.  It seemed that his  seizures were controlled much better since started on Zonegran.  Patient is happy with the seizure control. He is compliant with the seizure meds.     His seizures were described as tonic seizures, with probably LOC, which lasted for 20 sec each.     He was stried on Depakote recently due to increased seizure frequency.  Unfortunately, he experienced significant side effects from Depakote.  He was having significant hallucinations for the past two weeks, and Depakote had to be discontinued.     He had Botox injection for his right sided spasticity.  His spasticity improved.     According to the patient, he had a history of right-sided meningioma, status post resection in 01/2014. He had an intracerebral hemorrhage over the left side of the brain. He underwent a craniotomy for intraparenchymal hematoma on 01/02/2014. He had one seizure at the time of the hemorrhage. The description of the seizure is unclear.   In 11/2014, he had a repeat CT scan which showed dilatation of the left temporal horn related to entrapment. On 11/12/2014, Dr. Sudarshan Plummer performed an endoscopic fenestration of the cyst, and he was found to have seizures in the hospital. The patient reported that he had a total of four seizures since 11/2014. Three were generalized tonic-clonic seizures. One was probably simple partial seizure. The simple partial seizure was right arm tonic posturing with no loss of consciousness. He remembered those and the generalized tonic-clonic seizures started with right hand tonic posturing and then progressed to generalized tonic-clonic movement. He reported that two of the seizures were witnessed. One was witnessed in the hospital and one was witnessed by his sister.      TRIGGERS FOR SEIZURES: Unclear except missing medications.   RISK FACTORS FOR SEIZURES: Left intraparenchymal hemorrhage of the brain in 01/2014. He had no history of head trauma with loss of consciousness. No history of CNS infection. No history  of febrile convulsions.   PAST MEDICAL HISTORY: Cerebral intraparenchymal hemorrhage with hematoma, aneurysm basilar tip, right spastic hemiparesis, hypertension, seizures.   PAST SURGICAL HISTORY: Craniotomy with evacuation of hematoma subdural endoscopy, optical tracking system, ventriculostomy.        Current Outpatient Medications   Medication Sig Dispense Refill     acetaminophen (TYLENOL) 325 MG tablet Take 2 tablets (650 mg) by mouth every 6 hours as needed for mild pain       atorvastatin (LIPITOR) 10 MG tablet Take 1 tablet (10 mg) by mouth daily       IBUPROFEN PO Take 1-2 tablets by mouth every 6 hours as needed for moderate pain       Lacosamide (VIMPAT) 100 MG TABS tablet Take 1 tablet (100 mg) by mouth 2 times daily. 180 tablet 1     lamoTRIgine (LAMICTAL) 100 MG tablet Take 3 tabs (300 mg) in the morning and 4 tabs in the evening. 630 tablet 3     sertraline (ZOLOFT) 100 MG tablet Take 200 mg by mouth        zonisamide (ZONEGRAN) 100 MG capsule Take 3 tabs (300 mg) at hs 270 capsule 3     carvedilol (COREG) 6.25 MG tablet Take 1 tablet (6.25 mg) by mouth 2 times daily (Patient not taking: Reported on 7/7/2022) 60 tablet 0        ALLERGIES: No known drug allergy.   FAMILY HISTORY: No family history of seizures.   SOCIAL HISTORY: He is single, living by himself. Smokes one pack every five days cigarettes. No alcohol use. No drug abuse.   REVIEW OF SYSTEMS: Right-sided weakness and spasticity. Anxiety and seizures. The rest of the 10-point review of systems is negative.      PHYSICAL EXAMINATION:     AAO x 3, speech fluent and appropriate. Speech fluent and appropriate. Hearing grossly normal.     PREVIOUS DIAGNOSTIC TESTING: CT of the head on 04/17/2015 showed slight interval decrease in dilated left temporal cyst and the dilated left temporal horn since the prior exam, previous left-sided craniotomy and the white matter changes in the left hemisphere again noted. MRI scan on 10/07/2014 showed trapped  left lateral ventricle with cystic dilatation and adjacent vasogenic edema causing mass effect, unchanged. MRI scan on 01/02/2014 showed increased size of a hyperacute to acute intraparenchymal hemorrhage in the left posterior frontoparietal region with hemorrhage extending into an adjacent area of preexisting encephalomalacia as well as the left lateral ventricle. There is new blood extending into the right lateral ventricle occipital horn and possibly the fourth ventricle. Acute subarachnoid hemorrhage is again seen over the left parietal and the temporal lobe and insula with possible new subarachnoid blood over the cerebellar folia. There are two regions of enhancement along the anteromedial and anterolateral hemorrhage cavity which may represent sites of residual or recurrent tumor. Comparison with prior image would likely be helpful.    MRI brain 3/15/2024:  IMPRESSION:  1.  Enhancing intraventricular mass in the left lateral ventricle is increased in size from 2017 and likely represents an intraventricular meningioma.  2.  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  3.  No superimposed acute intracranial process.  4.  Stable chronic changes including previous left sided craniotomy with stable postsurgical change and encephalomalacia of the left cerebral hemisphere detailed above.    EEG on of 12/01/2014 showed abnormal. There is evidence of focal cerebral dysfunction over the left hemisphere, especially the left posterior hemisphere. This is consistent with focal cerebral dysfunction in this region.    EEG on 01/03/2014 showed abnormal EEG due to the presence of mild diffuse slowing consistent with mild diffuse encephalopathy as well as additional severe slowing over the left temporal parietal head region consistent with the patient's history of subdural hematoma in this region.      Component      Latest Ref Rng & Units 10/17/2016  4/3/2017   Lamotrigine Level       10.1 12.4   Zonisamide Level Quant         17.3         IMPRESSION:   1. Seizures.       Since the last visit about 6 months ago, he had no seizures. His last seizure was March 15 of 2024. He is now on Lamictal 300-400 and Zonegran 300 at bedtime, Lacosamide 100 mg bid.     MRI brain in Oct. 2024 showed stable brain image.    His previous seizures were described as tonic seizures, with probably LOC, which lasted for 20 sec each.    2. History of intracerebral hemorrhage status post evacuation of hematoma.   3. Status post endoscopic fenestration of the left temporal horn cyst related to entrapment.   4. Hypertension.   5. Anxiety.   6. Right spastic hemiparesis.  Improved with Botox.    7. Meningioma. Recent MRI showed enhancing intraventricular mass in the left lateral ventricle is increased in size.He saw his neurosurgeon for his meningioma. He was told that his tumor is growing really slowing. No need for surgery for now. Will followed in one year.      PLAN:   1. Continue Lamictal 300-400 mg and Zonegran 300mg qhs. Lacosamide 100 mg bid  2. Return to clinic in 6 months. Will check labs. If patient continue to have seizures, will increase Vimpat.      The longitudinal plan of care for seizures was addressed during this visit. Due to the added complexity in care, I will continue to support this patient in the subsequent management of this condition and with the ongoing continuity of care of this condition.       20 min total time was spent on the day of this visit.      10 min was spent on face to face time  10 min was spent on preparation of visit to review charts and labs, ordering medications and tests, and documentation of clinical information      Again, thank you for allowing me to participate in the care of your patient.      Sincerely,    Rishi Hirsch MD     9
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
## 2025-07-31 NOTE — PATIENT PROFILE ADULT. - HEALTHCARE QUESTIONS, PROFILE
Hi Dr. Lane, Our mutual patient is doing very well with CPAP and I am comfortable clearing her for surgery. Good luck!
none